# Patient Record
Sex: FEMALE | Race: WHITE | Employment: OTHER | URBAN - METROPOLITAN AREA
[De-identification: names, ages, dates, MRNs, and addresses within clinical notes are randomized per-mention and may not be internally consistent; named-entity substitution may affect disease eponyms.]

---

## 2018-11-01 PROBLEM — C50.111 MALIGNANT NEOPLASM OF CENTRAL PORTION OF RIGHT FEMALE BREAST (HCC): Status: ACTIVE | Noted: 2018-11-01

## 2018-11-05 DIAGNOSIS — C50.111 MALIGNANT NEOPLASM OF CENTRAL PORTION OF RIGHT BREAST IN FEMALE, ESTROGEN RECEPTOR NEGATIVE (HCC): ICD-10-CM

## 2018-11-05 DIAGNOSIS — Z17.1 MALIGNANT NEOPLASM OF CENTRAL PORTION OF RIGHT BREAST IN FEMALE, ESTROGEN RECEPTOR NEGATIVE (HCC): ICD-10-CM

## 2018-11-05 LAB
ALBUMIN SERPL-MCNC: 3.9 G/DL (ref 3.9–4.9)
ALP BLD-CCNC: 62 U/L (ref 40–130)
ALT SERPL-CCNC: 10 U/L (ref 0–33)
ANION GAP SERPL CALCULATED.3IONS-SCNC: 14 MEQ/L (ref 7–13)
AST SERPL-CCNC: 13 U/L (ref 0–35)
BILIRUB SERPL-MCNC: <0.2 MG/DL (ref 0–1.2)
BUN BLDV-MCNC: 14 MG/DL (ref 8–23)
CALCIUM SERPL-MCNC: 9.4 MG/DL (ref 8.6–10.2)
CHLORIDE BLD-SCNC: 104 MEQ/L (ref 98–107)
CO2: 23 MEQ/L (ref 22–29)
CREAT SERPL-MCNC: 1 MG/DL (ref 0.5–0.9)
GFR AFRICAN AMERICAN: >60
GFR NON-AFRICAN AMERICAN: 54.7
GLOBULIN: 3.1 G/DL (ref 2.3–3.5)
GLUCOSE BLD-MCNC: 157 MG/DL (ref 74–109)
POTASSIUM SERPL-SCNC: 4.1 MEQ/L (ref 3.5–5.1)
SODIUM BLD-SCNC: 141 MEQ/L (ref 132–144)
TOTAL PROTEIN: 7 G/DL (ref 6.4–8.1)

## 2018-12-12 DIAGNOSIS — Z17.1 MALIGNANT NEOPLASM OF CENTRAL PORTION OF RIGHT BREAST IN FEMALE, ESTROGEN RECEPTOR NEGATIVE (HCC): ICD-10-CM

## 2018-12-12 DIAGNOSIS — C50.111 MALIGNANT NEOPLASM OF CENTRAL PORTION OF RIGHT BREAST IN FEMALE, ESTROGEN RECEPTOR NEGATIVE (HCC): ICD-10-CM

## 2018-12-12 LAB
ALBUMIN SERPL-MCNC: 3.6 G/DL (ref 3.9–4.9)
ALP BLD-CCNC: 68 U/L (ref 40–130)
ALT SERPL-CCNC: 6 U/L (ref 0–33)
ANION GAP SERPL CALCULATED.3IONS-SCNC: 16 MEQ/L (ref 7–13)
AST SERPL-CCNC: 14 U/L (ref 0–35)
BILIRUB SERPL-MCNC: <0.2 MG/DL (ref 0–1.2)
BUN BLDV-MCNC: 9 MG/DL (ref 8–23)
CALCIUM SERPL-MCNC: 8.4 MG/DL (ref 8.6–10.2)
CHLORIDE BLD-SCNC: 107 MEQ/L (ref 98–107)
CO2: 23 MEQ/L (ref 22–29)
CREAT SERPL-MCNC: 1.18 MG/DL (ref 0.5–0.9)
GFR AFRICAN AMERICAN: 54.7
GFR NON-AFRICAN AMERICAN: 45.2
GLOBULIN: 2.9 G/DL (ref 2.3–3.5)
GLUCOSE BLD-MCNC: 134 MG/DL (ref 74–109)
POTASSIUM SERPL-SCNC: 3.5 MEQ/L (ref 3.5–5.1)
SODIUM BLD-SCNC: 146 MEQ/L (ref 132–144)
TOTAL PROTEIN: 6.5 G/DL (ref 6.4–8.1)

## 2018-12-19 DIAGNOSIS — D64.81 ANEMIA DUE TO ANTINEOPLASTIC CHEMOTHERAPY (CODE): ICD-10-CM

## 2018-12-19 DIAGNOSIS — C50.111 MALIGNANT NEOPLASM OF CENTRAL PORTION OF RIGHT BREAST IN FEMALE, ESTROGEN RECEPTOR NEGATIVE (HCC): ICD-10-CM

## 2018-12-19 DIAGNOSIS — Z17.1 MALIGNANT NEOPLASM OF CENTRAL PORTION OF RIGHT BREAST IN FEMALE, ESTROGEN RECEPTOR NEGATIVE (HCC): ICD-10-CM

## 2018-12-19 LAB
ALBUMIN SERPL-MCNC: 4 G/DL (ref 3.9–4.9)
ALP BLD-CCNC: 70 U/L (ref 40–130)
ALT SERPL-CCNC: 14 U/L (ref 0–33)
ANION GAP SERPL CALCULATED.3IONS-SCNC: 18 MEQ/L (ref 7–13)
AST SERPL-CCNC: 17 U/L (ref 0–35)
BILIRUB SERPL-MCNC: <0.2 MG/DL (ref 0–1.2)
BUN BLDV-MCNC: 15 MG/DL (ref 8–23)
CALCIUM SERPL-MCNC: 8.9 MG/DL (ref 8.6–10.2)
CHLORIDE BLD-SCNC: 104 MEQ/L (ref 98–107)
CO2: 21 MEQ/L (ref 22–29)
CREAT SERPL-MCNC: 1.13 MG/DL (ref 0.5–0.9)
GFR AFRICAN AMERICAN: 57.5
GFR NON-AFRICAN AMERICAN: 47.5
GLOBULIN: 3 G/DL (ref 2.3–3.5)
GLUCOSE BLD-MCNC: 137 MG/DL (ref 74–109)
POTASSIUM SERPL-SCNC: 4.2 MEQ/L (ref 3.5–5.1)
SODIUM BLD-SCNC: 143 MEQ/L (ref 132–144)
TOTAL PROTEIN: 7 G/DL (ref 6.4–8.1)

## 2018-12-26 DIAGNOSIS — D64.81 ANEMIA DUE TO ANTINEOPLASTIC CHEMOTHERAPY (CODE): ICD-10-CM

## 2018-12-26 DIAGNOSIS — Z17.1 MALIGNANT NEOPLASM OF CENTRAL PORTION OF RIGHT BREAST IN FEMALE, ESTROGEN RECEPTOR NEGATIVE (HCC): ICD-10-CM

## 2018-12-26 DIAGNOSIS — C50.111 MALIGNANT NEOPLASM OF CENTRAL PORTION OF RIGHT BREAST IN FEMALE, ESTROGEN RECEPTOR NEGATIVE (HCC): ICD-10-CM

## 2018-12-26 LAB
ANION GAP SERPL CALCULATED.3IONS-SCNC: 16 MEQ/L (ref 7–13)
BUN BLDV-MCNC: 19 MG/DL (ref 8–23)
CALCIUM SERPL-MCNC: 9.3 MG/DL (ref 8.6–10.2)
CHLORIDE BLD-SCNC: 104 MEQ/L (ref 98–107)
CO2: 21 MEQ/L (ref 22–29)
CREAT SERPL-MCNC: 1.11 MG/DL (ref 0.5–0.9)
GFR AFRICAN AMERICAN: 58.6
GFR NON-AFRICAN AMERICAN: 48.5
GLUCOSE BLD-MCNC: 111 MG/DL (ref 74–109)
POTASSIUM SERPL-SCNC: 5 MEQ/L (ref 3.5–5.1)
SODIUM BLD-SCNC: 141 MEQ/L (ref 132–144)

## 2019-01-10 PROBLEM — Z17.1 MALIGNANT NEOPLASM OF RIGHT BREAST IN FEMALE, ESTROGEN RECEPTOR NEGATIVE (HCC): Status: ACTIVE | Noted: 2018-11-01

## 2019-01-10 PROBLEM — C50.911 MALIGNANT NEOPLASM OF RIGHT BREAST IN FEMALE, ESTROGEN RECEPTOR NEGATIVE (HCC): Status: ACTIVE | Noted: 2018-11-01

## 2019-01-23 DIAGNOSIS — R19.7 DIARRHEA IN ADULT PATIENT: ICD-10-CM

## 2019-01-23 DIAGNOSIS — Z17.1 MALIGNANT NEOPLASM OF CENTRAL PORTION OF RIGHT BREAST IN FEMALE, ESTROGEN RECEPTOR NEGATIVE (HCC): ICD-10-CM

## 2019-01-23 DIAGNOSIS — D64.81 ANEMIA DUE TO ANTINEOPLASTIC CHEMOTHERAPY (CODE): ICD-10-CM

## 2019-01-23 DIAGNOSIS — C50.111 MALIGNANT NEOPLASM OF CENTRAL PORTION OF RIGHT BREAST IN FEMALE, ESTROGEN RECEPTOR NEGATIVE (HCC): ICD-10-CM

## 2019-01-23 LAB
ALBUMIN SERPL-MCNC: 4 G/DL (ref 3.9–4.9)
ALP BLD-CCNC: 69 U/L (ref 40–130)
ALT SERPL-CCNC: 10 U/L (ref 0–33)
ANION GAP SERPL CALCULATED.3IONS-SCNC: 10 MEQ/L (ref 7–13)
AST SERPL-CCNC: 15 U/L (ref 0–35)
BILIRUB SERPL-MCNC: <0.2 MG/DL (ref 0–1.2)
BUN BLDV-MCNC: 20 MG/DL (ref 8–23)
CALCIUM SERPL-MCNC: 9.4 MG/DL (ref 8.6–10.2)
CHLORIDE BLD-SCNC: 102 MEQ/L (ref 98–107)
CO2: 24 MEQ/L (ref 22–29)
CREAT SERPL-MCNC: 1.15 MG/DL (ref 0.5–0.9)
GFR AFRICAN AMERICAN: 56.3
GFR NON-AFRICAN AMERICAN: 46.5
GLOBULIN: 2.9 G/DL (ref 2.3–3.5)
GLUCOSE BLD-MCNC: 169 MG/DL (ref 74–109)
POTASSIUM SERPL-SCNC: 4.8 MEQ/L (ref 3.5–5.1)
SODIUM BLD-SCNC: 136 MEQ/L (ref 132–144)
TOTAL PROTEIN: 6.9 G/DL (ref 6.4–8.1)

## 2019-02-04 ENCOUNTER — HOSPITAL ENCOUNTER (OUTPATIENT)
Dept: RADIATION ONCOLOGY | Age: 69
Discharge: HOME | End: 2019-02-04
Payer: MEDICARE

## 2019-02-04 VITALS
SYSTOLIC BLOOD PRESSURE: 197 MMHG | DIASTOLIC BLOOD PRESSURE: 86 MMHG | WEIGHT: 131.4 LBS | HEIGHT: 62 IN | TEMPERATURE: 97 F | BODY MASS INDEX: 24.18 KG/M2 | OXYGEN SATURATION: 97 % | HEART RATE: 65 BPM | RESPIRATION RATE: 16 BRPM

## 2019-02-04 DIAGNOSIS — J44.9 CHRONIC OBSTRUCTIVE PULMONARY DISEASE, UNSPECIFIED COPD TYPE (HCC): ICD-10-CM

## 2019-02-04 DIAGNOSIS — C50.111 MALIGNANT NEOPLASM OF CENTRAL PORTION OF RIGHT BREAST IN FEMALE, ESTROGEN RECEPTOR NEGATIVE (HCC): ICD-10-CM

## 2019-02-04 DIAGNOSIS — Z17.1 MALIGNANT NEOPLASM OF CENTRAL PORTION OF RIGHT BREAST IN FEMALE, ESTROGEN RECEPTOR NEGATIVE (HCC): ICD-10-CM

## 2019-02-04 DIAGNOSIS — E11.8 TYPE 2 DIABETES MELLITUS WITH COMPLICATION, UNSPECIFIED WHETHER LONG TERM INSULIN USE: ICD-10-CM

## 2019-02-04 DIAGNOSIS — I10 ESSENTIAL HYPERTENSION: ICD-10-CM

## 2019-02-04 PROCEDURE — 99214 OFFICE O/P EST MOD 30 MIN: CPT | Performed by: RADIOLOGY

## 2019-02-06 ENCOUNTER — OFFICE VISIT (OUTPATIENT)
Dept: SURGERY | Age: 69
End: 2019-02-06
Payer: MEDICARE

## 2019-02-06 VITALS
HEIGHT: 62 IN | WEIGHT: 131 LBS | BODY MASS INDEX: 24.11 KG/M2 | HEART RATE: 75 BPM | DIASTOLIC BLOOD PRESSURE: 72 MMHG | OXYGEN SATURATION: 98 % | RESPIRATION RATE: 20 BRPM | SYSTOLIC BLOOD PRESSURE: 168 MMHG | TEMPERATURE: 96.4 F

## 2019-02-06 DIAGNOSIS — C50.411 CARCINOMA OF UPPER-OUTER QUADRANT OF RIGHT BREAST IN FEMALE, ESTROGEN RECEPTOR NEGATIVE (HCC): Primary | ICD-10-CM

## 2019-02-06 DIAGNOSIS — C50.911 HER2-POSITIVE CARCINOMA OF RIGHT BREAST (HCC): ICD-10-CM

## 2019-02-06 DIAGNOSIS — Z17.1 CARCINOMA OF UPPER-OUTER QUADRANT OF RIGHT BREAST IN FEMALE, ESTROGEN RECEPTOR NEGATIVE (HCC): Primary | ICD-10-CM

## 2019-02-06 PROCEDURE — 99205 OFFICE O/P NEW HI 60 MIN: CPT | Performed by: SURGERY

## 2019-02-07 ENCOUNTER — TELEPHONE (OUTPATIENT)
Dept: SURGERY | Age: 69
End: 2019-02-07

## 2019-02-07 ASSESSMENT — ENCOUNTER SYMPTOMS
SORE THROAT: 0
COUGH: 0
NAUSEA: 1
ABDOMINAL PAIN: 0
DIARRHEA: 1
VOMITING: 1
CHEST TIGHTNESS: 0
SHORTNESS OF BREATH: 0
COLOR CHANGE: 0

## 2019-02-20 PROBLEM — D64.81 ANEMIA DUE TO ANTINEOPLASTIC CHEMOTHERAPY (CODE): Status: ACTIVE | Noted: 2019-02-20

## 2019-03-07 ENCOUNTER — HOSPITAL ENCOUNTER (OUTPATIENT)
Dept: RADIATION ONCOLOGY | Age: 69
Discharge: HOME | End: 2019-03-07
Payer: MEDICARE

## 2019-03-07 VITALS
BODY MASS INDEX: 24.22 KG/M2 | HEART RATE: 78 BPM | DIASTOLIC BLOOD PRESSURE: 73 MMHG | WEIGHT: 132.4 LBS | RESPIRATION RATE: 16 BRPM | SYSTOLIC BLOOD PRESSURE: 171 MMHG | TEMPERATURE: 97.8 F

## 2019-03-07 PROCEDURE — 77290 THER RAD SIMULAJ FIELD CPLX: CPT | Performed by: RADIOLOGY

## 2019-03-07 PROCEDURE — 99214 OFFICE O/P EST MOD 30 MIN: CPT | Performed by: RADIOLOGY

## 2019-03-07 PROCEDURE — 77334 RADIATION TREATMENT AID(S): CPT | Performed by: RADIOLOGY

## 2019-03-13 PROCEDURE — 77300 RADIATION THERAPY DOSE PLAN: CPT | Performed by: RADIOLOGY

## 2019-03-13 PROCEDURE — 77295 3-D RADIOTHERAPY PLAN: CPT | Performed by: RADIOLOGY

## 2019-03-13 PROCEDURE — 77334 RADIATION TREATMENT AID(S): CPT | Performed by: RADIOLOGY

## 2019-03-21 PROCEDURE — 77280 THER RAD SIMULAJ FIELD SMPL: CPT | Performed by: RADIOLOGY

## 2019-03-25 PROCEDURE — 77412 RADIATION TX DELIVERY LVL 3: CPT | Performed by: RADIOLOGY

## 2019-03-26 PROCEDURE — 77412 RADIATION TX DELIVERY LVL 3: CPT | Performed by: RADIOLOGY

## 2019-03-27 DIAGNOSIS — C50.111 MALIGNANT NEOPLASM OF CENTRAL PORTION OF RIGHT BREAST IN FEMALE, ESTROGEN RECEPTOR NEGATIVE (HCC): ICD-10-CM

## 2019-03-27 DIAGNOSIS — C50.111 MALIGNANT NEOPLASM OF CENTRAL PORTION OF RIGHT FEMALE BREAST, UNSPECIFIED ESTROGEN RECEPTOR STATUS (HCC): ICD-10-CM

## 2019-03-27 DIAGNOSIS — D64.81 ANEMIA DUE TO ANTINEOPLASTIC CHEMOTHERAPY (CODE): ICD-10-CM

## 2019-03-27 DIAGNOSIS — Z17.1 MALIGNANT NEOPLASM OF CENTRAL PORTION OF RIGHT BREAST IN FEMALE, ESTROGEN RECEPTOR NEGATIVE (HCC): ICD-10-CM

## 2019-03-27 LAB
ALBUMIN SERPL-MCNC: 3.8 G/DL (ref 3.5–4.6)
ALP BLD-CCNC: 68 U/L (ref 40–130)
ALT SERPL-CCNC: 8 U/L (ref 0–33)
ANION GAP SERPL CALCULATED.3IONS-SCNC: 10 MEQ/L (ref 9–15)
AST SERPL-CCNC: 20 U/L (ref 0–35)
BILIRUB SERPL-MCNC: <0.2 MG/DL (ref 0.2–0.7)
BUN BLDV-MCNC: 17 MG/DL (ref 8–23)
CALCIUM SERPL-MCNC: 9.4 MG/DL (ref 8.5–9.9)
CHLORIDE BLD-SCNC: 105 MEQ/L (ref 95–107)
CO2: 24 MEQ/L (ref 20–31)
CREAT SERPL-MCNC: 1.07 MG/DL (ref 0.5–0.9)
GFR AFRICAN AMERICAN: >60
GFR NON-AFRICAN AMERICAN: 50.5
GLOBULIN: 3.2 G/DL (ref 2.3–3.5)
GLUCOSE BLD-MCNC: 135 MG/DL (ref 70–99)
POTASSIUM SERPL-SCNC: 5 MEQ/L (ref 3.4–4.9)
SODIUM BLD-SCNC: 139 MEQ/L (ref 135–144)
TOTAL PROTEIN: 7 G/DL (ref 6.3–8)

## 2019-03-27 PROCEDURE — 77412 RADIATION TX DELIVERY LVL 3: CPT | Performed by: RADIOLOGY

## 2019-03-27 PROCEDURE — 99212 OFFICE O/P EST SF 10 MIN: CPT | Performed by: RADIOLOGY

## 2019-03-28 PROCEDURE — 77412 RADIATION TX DELIVERY LVL 3: CPT | Performed by: RADIOLOGY

## 2019-03-29 PROCEDURE — 77336 RADIATION PHYSICS CONSULT: CPT | Performed by: RADIOLOGY

## 2019-03-29 PROCEDURE — 77412 RADIATION TX DELIVERY LVL 3: CPT | Performed by: RADIOLOGY

## 2019-03-29 PROCEDURE — 77417 THER RADIOLOGY PORT IMAGE(S): CPT | Performed by: RADIOLOGY

## 2019-04-01 ENCOUNTER — HOSPITAL ENCOUNTER (OUTPATIENT)
Dept: RADIATION ONCOLOGY | Age: 69
Discharge: HOME | End: 2019-04-01
Payer: MEDICARE

## 2019-04-01 PROCEDURE — 77412 RADIATION TX DELIVERY LVL 3: CPT | Performed by: RADIOLOGY

## 2019-04-02 PROCEDURE — 77412 RADIATION TX DELIVERY LVL 3: CPT | Performed by: RADIOLOGY

## 2019-04-02 PROCEDURE — 99211 OFF/OP EST MAY X REQ PHY/QHP: CPT | Performed by: RADIOLOGY

## 2019-04-03 PROCEDURE — 77412 RADIATION TX DELIVERY LVL 3: CPT | Performed by: RADIOLOGY

## 2019-04-04 PROCEDURE — 77412 RADIATION TX DELIVERY LVL 3: CPT | Performed by: RADIOLOGY

## 2019-04-05 PROCEDURE — 77412 RADIATION TX DELIVERY LVL 3: CPT | Performed by: RADIOLOGY

## 2019-04-05 PROCEDURE — 77417 THER RADIOLOGY PORT IMAGE(S): CPT | Performed by: RADIOLOGY

## 2019-04-05 PROCEDURE — 77336 RADIATION PHYSICS CONSULT: CPT | Performed by: RADIOLOGY

## 2019-04-08 PROCEDURE — 77412 RADIATION TX DELIVERY LVL 3: CPT | Performed by: RADIOLOGY

## 2019-04-09 PROCEDURE — 99212 OFFICE O/P EST SF 10 MIN: CPT | Performed by: RADIOLOGY

## 2019-04-09 PROCEDURE — 77412 RADIATION TX DELIVERY LVL 3: CPT | Performed by: RADIOLOGY

## 2019-04-10 PROCEDURE — 77412 RADIATION TX DELIVERY LVL 3: CPT | Performed by: RADIOLOGY

## 2019-04-11 PROCEDURE — 77412 RADIATION TX DELIVERY LVL 3: CPT | Performed by: RADIOLOGY

## 2019-04-12 PROCEDURE — 77412 RADIATION TX DELIVERY LVL 3: CPT | Performed by: RADIOLOGY

## 2019-04-12 PROCEDURE — 77336 RADIATION PHYSICS CONSULT: CPT | Performed by: RADIOLOGY

## 2019-04-12 PROCEDURE — 77417 THER RADIOLOGY PORT IMAGE(S): CPT | Performed by: RADIOLOGY

## 2019-04-15 PROCEDURE — 77412 RADIATION TX DELIVERY LVL 3: CPT | Performed by: RADIOLOGY

## 2019-04-16 PROCEDURE — 99211 OFF/OP EST MAY X REQ PHY/QHP: CPT | Performed by: RADIOLOGY

## 2019-04-16 PROCEDURE — 77412 RADIATION TX DELIVERY LVL 3: CPT | Performed by: RADIOLOGY

## 2019-04-17 DIAGNOSIS — C50.111 MALIGNANT NEOPLASM OF CENTRAL PORTION OF RIGHT FEMALE BREAST, UNSPECIFIED ESTROGEN RECEPTOR STATUS (HCC): ICD-10-CM

## 2019-04-17 DIAGNOSIS — D64.81 ANEMIA DUE TO ANTINEOPLASTIC CHEMOTHERAPY (CODE): ICD-10-CM

## 2019-04-17 LAB
ALBUMIN SERPL-MCNC: 3.9 G/DL (ref 3.5–4.6)
ALP BLD-CCNC: 65 U/L (ref 40–130)
ALT SERPL-CCNC: 6 U/L (ref 0–33)
ANION GAP SERPL CALCULATED.3IONS-SCNC: 14 MEQ/L (ref 9–15)
AST SERPL-CCNC: 14 U/L (ref 0–35)
BILIRUB SERPL-MCNC: <0.2 MG/DL (ref 0.2–0.7)
BUN BLDV-MCNC: 18 MG/DL (ref 8–23)
CALCIUM SERPL-MCNC: 9.5 MG/DL (ref 8.5–9.9)
CHLORIDE BLD-SCNC: 104 MEQ/L (ref 95–107)
CO2: 23 MEQ/L (ref 20–31)
CREAT SERPL-MCNC: 1.33 MG/DL (ref 0.5–0.9)
GFR AFRICAN AMERICAN: 47.6
GFR NON-AFRICAN AMERICAN: 39.3
GLOBULIN: 3 G/DL (ref 2.3–3.5)
GLUCOSE BLD-MCNC: 106 MG/DL (ref 70–99)
POTASSIUM SERPL-SCNC: 4.8 MEQ/L (ref 3.4–4.9)
SODIUM BLD-SCNC: 141 MEQ/L (ref 135–144)
TOTAL PROTEIN: 6.9 G/DL (ref 6.3–8)

## 2019-04-17 PROCEDURE — 77412 RADIATION TX DELIVERY LVL 3: CPT | Performed by: RADIOLOGY

## 2019-04-18 PROCEDURE — 77412 RADIATION TX DELIVERY LVL 3: CPT | Performed by: RADIOLOGY

## 2019-04-19 PROCEDURE — 77412 RADIATION TX DELIVERY LVL 3: CPT | Performed by: RADIOLOGY

## 2019-04-19 PROCEDURE — 77336 RADIATION PHYSICS CONSULT: CPT | Performed by: RADIOLOGY

## 2019-04-19 PROCEDURE — 77417 THER RADIOLOGY PORT IMAGE(S): CPT | Performed by: RADIOLOGY

## 2019-04-22 PROCEDURE — 77412 RADIATION TX DELIVERY LVL 3: CPT | Performed by: RADIOLOGY

## 2019-04-23 VITALS
OXYGEN SATURATION: 98 % | RESPIRATION RATE: 16 BRPM | HEART RATE: 62 BPM | SYSTOLIC BLOOD PRESSURE: 141 MMHG | TEMPERATURE: 97.8 F | BODY MASS INDEX: 24.03 KG/M2 | DIASTOLIC BLOOD PRESSURE: 61 MMHG | WEIGHT: 131.4 LBS

## 2019-04-23 PROCEDURE — 99213 OFFICE O/P EST LOW 20 MIN: CPT | Performed by: RADIOLOGY

## 2019-04-23 PROCEDURE — 77412 RADIATION TX DELIVERY LVL 3: CPT | Performed by: RADIOLOGY

## 2019-04-23 RX ORDER — LOPERAMIDE HYDROCHLORIDE 2 MG/1
2 CAPSULE ORAL 4 TIMES DAILY PRN
COMMUNITY

## 2019-04-23 NOTE — ONCOLOGY
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 Poorly tolerated neoadjuvant chemotherapy, on Herceptin. [id number]. Treatment Dates:  3/25-4/26/19    Site: Dose: Total # fractions: Dose per fraction: Energy: Technique   R SCF and nodes 45 Gy 25 1.8 Gy 18 MV photons Irish RPO   R breast 50 Gy 25 2 Gy 10 MV photons Opposed tangents     Concurrent therapy:  q 3 wk Herceptin perjeta    Treatment course:  Ms. Wali Bhakta tolerated radiation treatment well with minimal skin toxicity. Patient was instructed to return to Medical oncology for continued Herceptin perjeta and I will see her back in 3 months.  Mammography at [facility] scheduled by Dr Shon Villarreal, who she sees in June or July

## 2019-04-24 PROCEDURE — 77412 RADIATION TX DELIVERY LVL 3: CPT | Performed by: RADIOLOGY

## 2019-04-25 PROCEDURE — 77412 RADIATION TX DELIVERY LVL 3: CPT | Performed by: RADIOLOGY

## 2019-04-26 PROCEDURE — 77412 RADIATION TX DELIVERY LVL 3: CPT | Performed by: RADIOLOGY

## 2019-04-26 PROCEDURE — 77336 RADIATION PHYSICS CONSULT: CPT | Performed by: RADIOLOGY

## 2019-08-02 ENCOUNTER — HOSPITAL ENCOUNTER (OUTPATIENT)
Dept: RADIATION ONCOLOGY | Age: 69
Discharge: HOME | End: 2019-08-02
Payer: MEDICARE

## 2019-08-02 VITALS
TEMPERATURE: 96.3 F | OXYGEN SATURATION: 100 % | DIASTOLIC BLOOD PRESSURE: 66 MMHG | BODY MASS INDEX: 24.6 KG/M2 | HEART RATE: 67 BPM | RESPIRATION RATE: 16 BRPM | WEIGHT: 134.5 LBS | SYSTOLIC BLOOD PRESSURE: 152 MMHG

## 2019-08-02 PROCEDURE — 99213 OFFICE O/P EST LOW 20 MIN: CPT | Performed by: RADIOLOGY

## 2021-09-07 ENCOUNTER — APPOINTMENT (OUTPATIENT)
Dept: GENERAL RADIOLOGY | Age: 71
End: 2021-09-07
Payer: MEDICARE

## 2021-09-07 ENCOUNTER — HOSPITAL ENCOUNTER (EMERGENCY)
Age: 71
Discharge: HOME | End: 2021-09-07
Payer: MEDICARE

## 2021-09-07 VITALS
DIASTOLIC BLOOD PRESSURE: 53 MMHG | BODY MASS INDEX: 27.23 KG/M2 | RESPIRATION RATE: 16 BRPM | HEART RATE: 60 BPM | SYSTOLIC BLOOD PRESSURE: 116 MMHG | OXYGEN SATURATION: 100 % | TEMPERATURE: 97.7 F | HEIGHT: 62 IN | WEIGHT: 148 LBS

## 2021-09-07 DIAGNOSIS — J40 BRONCHITIS: Primary | ICD-10-CM

## 2021-09-07 LAB
ALBUMIN SERPL-MCNC: 3.5 G/DL (ref 3.5–4.6)
ALP BLD-CCNC: 86 U/L (ref 40–130)
ALT SERPL-CCNC: 51 U/L (ref 0–33)
ANION GAP SERPL CALCULATED.3IONS-SCNC: 16 MEQ/L (ref 9–15)
AST SERPL-CCNC: 43 U/L (ref 0–35)
BACTERIA: NEGATIVE /HPF
BASOPHILS ABSOLUTE: 0.1 K/UL (ref 0–0.2)
BASOPHILS RELATIVE PERCENT: 0.4 %
BILIRUB SERPL-MCNC: <0.2 MG/DL (ref 0.2–0.7)
BILIRUBIN URINE: NEGATIVE
BLOOD, URINE: NEGATIVE
BUN BLDV-MCNC: 29 MG/DL (ref 8–23)
CALCIUM SERPL-MCNC: 9 MG/DL (ref 8.5–9.9)
CHLORIDE BLD-SCNC: 105 MEQ/L (ref 95–107)
CLARITY: CLEAR
CO2: 18 MEQ/L (ref 20–31)
COLOR: YELLOW
CREAT SERPL-MCNC: 1.45 MG/DL (ref 0.5–0.9)
EOSINOPHILS ABSOLUTE: 0 K/UL (ref 0–0.7)
EOSINOPHILS RELATIVE PERCENT: 0 %
EPITHELIAL CELLS, UA: NORMAL /HPF (ref 0–5)
GFR AFRICAN AMERICAN: 42.8
GFR NON-AFRICAN AMERICAN: 35.3
GLOBULIN: 3.5 G/DL (ref 2.3–3.5)
GLUCOSE BLD-MCNC: 190 MG/DL (ref 70–99)
GLUCOSE URINE: NEGATIVE MG/DL
HCT VFR BLD CALC: 34.6 % (ref 37–47)
HEMOGLOBIN: 11.5 G/DL (ref 12–16)
HYALINE CASTS: NORMAL /HPF (ref 0–5)
KETONES, URINE: NEGATIVE MG/DL
LACTIC ACID: 2.5 MMOL/L (ref 0.5–2.2)
LEUKOCYTE ESTERASE, URINE: NEGATIVE
LYMPHOCYTES ABSOLUTE: 1.9 K/UL (ref 1–4.8)
LYMPHOCYTES RELATIVE PERCENT: 14.1 %
MCH RBC QN AUTO: 31 PG (ref 27–31.3)
MCHC RBC AUTO-ENTMCNC: 33.4 % (ref 33–37)
MCV RBC AUTO: 93 FL (ref 82–100)
MONOCYTES ABSOLUTE: 0.7 K/UL (ref 0.2–0.8)
MONOCYTES RELATIVE PERCENT: 4.9 %
NEUTROPHILS ABSOLUTE: 11 K/UL (ref 1.4–6.5)
NEUTROPHILS RELATIVE PERCENT: 80.6 %
NITRITE, URINE: NEGATIVE
PDW BLD-RTO: 14 % (ref 11.5–14.5)
PH UA: 5 (ref 5–9)
PLATELET # BLD: 256 K/UL (ref 130–400)
POTASSIUM SERPL-SCNC: 4.9 MEQ/L (ref 3.4–4.9)
PROCALCITONIN: 0.1 NG/ML (ref 0–0.15)
PROTEIN UA: 30 MG/DL
RBC # BLD: 3.72 M/UL (ref 4.2–5.4)
RBC UA: NORMAL /HPF (ref 0–5)
SARS-COV-2, NAAT: NOT DETECTED
SODIUM BLD-SCNC: 139 MEQ/L (ref 135–144)
SPECIFIC GRAVITY UA: 1.03 (ref 1–1.03)
TOTAL PROTEIN: 7 G/DL (ref 6.3–8)
URINE REFLEX TO CULTURE: ABNORMAL
UROBILINOGEN, URINE: 0.2 E.U./DL
WBC # BLD: 13.7 K/UL (ref 4.8–10.8)
WBC UA: NORMAL /HPF (ref 0–5)

## 2021-09-07 PROCEDURE — 71045 X-RAY EXAM CHEST 1 VIEW: CPT

## 2021-09-07 PROCEDURE — 87635 SARS-COV-2 COVID-19 AMP PRB: CPT

## 2021-09-07 PROCEDURE — 81001 URINALYSIS AUTO W/SCOPE: CPT

## 2021-09-07 PROCEDURE — 36415 COLL VENOUS BLD VENIPUNCTURE: CPT

## 2021-09-07 PROCEDURE — 85025 COMPLETE CBC W/AUTO DIFF WBC: CPT

## 2021-09-07 PROCEDURE — 99284 EMERGENCY DEPT VISIT MOD MDM: CPT

## 2021-09-07 PROCEDURE — 84145 PROCALCITONIN (PCT): CPT

## 2021-09-07 PROCEDURE — 6360000002 HC RX W HCPCS: Performed by: NURSE PRACTITIONER

## 2021-09-07 PROCEDURE — 2580000003 HC RX 258: Performed by: NURSE PRACTITIONER

## 2021-09-07 PROCEDURE — 96365 THER/PROPH/DIAG IV INF INIT: CPT

## 2021-09-07 PROCEDURE — 80053 COMPREHEN METABOLIC PANEL: CPT

## 2021-09-07 PROCEDURE — 87040 BLOOD CULTURE FOR BACTERIA: CPT

## 2021-09-07 PROCEDURE — 83605 ASSAY OF LACTIC ACID: CPT

## 2021-09-07 PROCEDURE — 96367 TX/PROPH/DG ADDL SEQ IV INF: CPT

## 2021-09-07 RX ORDER — CEFUROXIME AXETIL 500 MG/1
500 TABLET ORAL 2 TIMES DAILY
Qty: 14 TABLET | Refills: 0 | Status: SHIPPED | OUTPATIENT
Start: 2021-09-07 | End: 2021-09-07 | Stop reason: SDUPTHER

## 2021-09-07 RX ORDER — DOXYCYCLINE 100 MG/1
100 TABLET ORAL 2 TIMES DAILY
Qty: 14 TABLET | Refills: 0 | Status: SHIPPED | OUTPATIENT
Start: 2021-09-07 | End: 2021-09-14

## 2021-09-07 RX ORDER — CEFUROXIME AXETIL 500 MG/1
500 TABLET ORAL 2 TIMES DAILY
Qty: 14 TABLET | Refills: 0 | Status: SHIPPED | OUTPATIENT
Start: 2021-09-07 | End: 2021-09-14

## 2021-09-07 RX ORDER — DOXYCYCLINE 100 MG/1
100 TABLET ORAL 2 TIMES DAILY
Qty: 14 TABLET | Refills: 0 | Status: SHIPPED | OUTPATIENT
Start: 2021-09-07 | End: 2021-09-07 | Stop reason: SDUPTHER

## 2021-09-07 RX ADMIN — AZITHROMYCIN MONOHYDRATE 500 MG: 500 INJECTION, POWDER, LYOPHILIZED, FOR SOLUTION INTRAVENOUS at 12:15

## 2021-09-07 RX ADMIN — CEFTRIAXONE SODIUM 1000 MG: 1 INJECTION, POWDER, FOR SOLUTION INTRAMUSCULAR; INTRAVENOUS at 11:35

## 2021-09-07 ASSESSMENT — ENCOUNTER SYMPTOMS
DIARRHEA: 0
VOICE CHANGE: 0
VOMITING: 0
SORE THROAT: 0
COUGH: 1
ABDOMINAL PAIN: 0
NAUSEA: 0
BACK PAIN: 0
SHORTNESS OF BREATH: 0

## 2021-09-07 NOTE — ED NOTES
Pt states she has not taken any of her medications for today.      Alexsandra Ely, RN  09/07/21 [id number]

## 2021-09-07 NOTE — ED PROVIDER NOTES
[address] [facility] ED  eMERGENCY dEPARTMENT eNCOUnter      Pt Name: Katherleen Skiff  MRN: [id number]  [place] 1950  Date of evaluation: 9/7/2021  Provider: ANDRES Collado CNP      HISTORY OF PRESENT ILLNESS    Katherleen Skiff is a 66 y.o. female who presents to the Emergency Department with cough x 1 week. Patient states she was at [facility] yesterday but left d/t being in the room with COVID positive patients. She comes to the ER today because she is still not feeling well. She does have a history of emphysema and is a smoker. She denies fever, SOB, leg swelling, chest pain. Eating and drinking fair. REVIEW OF SYSTEMS       Review of Systems   Constitutional: Positive for fatigue. Negative for activity change, appetite change and fever. HENT: Negative for congestion, sneezing, sore throat and voice change. Respiratory: Positive for cough. Negative for shortness of breath. Cardiovascular: Negative for chest pain. Gastrointestinal: Negative for abdominal pain, diarrhea, nausea and vomiting. Genitourinary: Negative for dysuria. Musculoskeletal: Negative for arthralgias, back pain and neck pain. Skin: Negative for rash. Neurological: Negative for dizziness, light-headedness and headaches. All other systems reviewed and are negative. PAST MEDICAL HISTORY     Past Medical History:   Diagnosis Date    Breast cancer ([facility] [address]     breast    COPD (chronic obstructive pulmonary disease) ([facility] [address]     emphysema    Diabetes mellitus ([facility] [address]     Hypertension     Type 2 diabetes mellitus without complication ([facility] [address]          SURGICAL HISTORY       Past Surgical History:   Procedure Laterality Date    APPENDECTOMY      BREAST SURGERY Left     UPPER GASTROINTESTINAL ENDOSCOPY  10/09/2018    DR. FORD         CURRENT MEDICATIONS       Previous Medications    ASPIRIN 81 MG TABLET    Take 81 mg by mouth daily    CHOLECALCIFEROL (VITAMIN D3) 5000 UNITS CAPS    Take 1 capsule by mouth daily DIPHENOXYLATE-ATROPINE (LOMOTIL) 2.5-0.025 MG PER TABLET    Take 1 tablet by mouth 4 times daily as needed for Diarrhea. INFLUENZA VAC A&B SURF ANT ADJ 0.5 ML AUGUSTO    Inject 0.5 mLs into the muscle once    LIDOCAINE-PRILOCAINE (EMLA) 2.5-2.5 % CREAM    Apply 1 drop topically daily    LOPERAMIDE (IMODIUM) 2 MG CAPSULE    Take 2 mg by mouth 4 times daily as needed for Diarrhea    METFORMIN (GLUCOPHAGE) 500 MG TABLET    Take 1 tablet by mouth daily    METOPROLOL SUCCINATE (TOPROL XL) 50 MG EXTENDED RELEASE TABLET    Take 50 mg by mouth daily    PANTOPRAZOLE (PROTONIX) 40 MG TABLET    Take 1 tablet by mouth daily    PROCHLORPERAZINE (COMPAZINE) 10 MG TABLET    Take 1 tablet by mouth every 6 hours as needed (for nausea or vomiting)    VITAMIN B-12 (CYANOCOBALAMIN) 100 MCG TABLET    Take 50 mcg by mouth daily       ALLERGIES     Lipitor [atorvastatin] and Zofran [ondansetron]    FAMILY HISTORY       Family History   Problem Relation Age of Onset    No Known Problems Mother     Cancer Father     No Known Problems Sister     No Known Problems Brother     No Known Problems Maternal Aunt     No Known Problems Maternal Uncle     No Known Problems Paternal Aunt     No Known Problems Paternal Uncle     No Known Problems Maternal Grandmother     No Known Problems Maternal Grandfather     No Known Problems Paternal Grandmother     No Known Problems Paternal Grandfather     No Known Problems Other           SOCIAL HISTORY       Social History     Socioeconomic History    Marital status:       Spouse name: None    Number of children: None    Years of education: None    Highest education level: None   Occupational History    Occupation: retired   Tobacco Use    Smoking status: Heavy Tobacco Smoker     Packs/day: 1.00     Years: 40.00     Pack years: 40.00    Smokeless tobacco: Never Used    Tobacco comment: trying to slow down smoking, pt encourage to quit smoking entirely   Substance and Sexual Activity  Alcohol use: No    Drug use: No    Sexual activity: Not Currently     Partners: Female   Other Topics Concern    None   Social History Narrative    None     Social Determinants of Health     Financial Resource Strain:     Difficulty of Paying Living Expenses:    Food Insecurity:     Worried About Running Out of Food in the Last Year:     Ran Out of Food in the Last Year:    Transportation Needs:     Lack of Transportation (Medical):  Lack of Transportation (Non-Medical):    Physical Activity:     Days of Exercise per Week:     Minutes of Exercise per Session:    Stress:     Feeling of Stress :    Social Connections:     Frequency of Communication with Friends and Family:     Frequency of Social Gatherings with Friends and Family:     Attends Caodaism Services:     Active Member of Clubs or Organizations:     Attends Club or Organization Meetings:     Marital Status:    Intimate Partner Violence:     Fear of Current or Ex-Partner:     Emotionally Abused:     Physically Abused:     Sexually Abused:        SCREENINGS      @FLOW([id number])@      PHYSICAL EXAM    (up to 7 for level 4, 8 or more for level 5)     ED Triage Vitals [09/07/21 0900]   BP Temp Temp Source Pulse Resp SpO2 Height Weight   129/73 97.7 °F (36.5 °C) Oral 75 18 97 % 5' 2\" (1.575 m) 148 lb (67.1 kg)       Physical Exam  Vitals and nursing note reviewed. Constitutional:       Appearance: She is well-developed. HENT:      Head: Normocephalic and atraumatic. Right Ear: Hearing, tympanic membrane, ear canal and external ear normal.      Left Ear: Hearing, tympanic membrane, ear canal and external ear normal.      Nose: Nose normal.      Mouth/Throat:      Lips: Pink. Mouth: Mucous membranes are moist.      Pharynx: Oropharynx is clear. Uvula midline. Eyes:      Conjunctiva/sclera: Conjunctivae normal.      Pupils: Pupils are equal, round, and reactive to light.    Cardiovascular:      Rate and Rhythm: Normal rate and regular rhythm. Heart sounds: Normal heart sounds. Pulmonary:      Effort: Pulmonary effort is normal. No accessory muscle usage, respiratory distress or retractions. Breath sounds: Normal breath sounds. No decreased air movement. No decreased breath sounds, wheezing or rhonchi. Abdominal:      General: Bowel sounds are normal. There is no distension. Palpations: Abdomen is soft. Tenderness: There is no abdominal tenderness. Musculoskeletal:         General: Normal range of motion. Cervical back: Normal range of motion and neck supple. Skin:     General: Skin is warm and dry. Neurological:      General: No focal deficit present. Mental Status: She is alert and oriented to person, place, and time. GCS: GCS eye subscore is 4. GCS verbal subscore is 5. GCS motor subscore is 6. Deep Tendon Reflexes: Reflexes are normal and symmetric. Psychiatric:         Judgment: Judgment normal.           All other labs were within normal range or not returned as of this dictation. EMERGENCY DEPARTMENT COURSE and DIFFERENTIALDIAGNOSIS/MDM:   Vitals:    Vitals:    09/07/21 0900 09/07/21 1115 09/07/21 1230 09/07/21 1300   BP: 129/73 (!) 114/51 (!) 128/56 (!) 116/53   Pulse: 75 60     Resp: 18 16     Temp: 97.7 °F (36.5 °C)      TempSrc: Oral      SpO2: 97% 98% 98% 100%   Weight: 148 lb (67.1 kg)      Height: 5' 2\" (1.575 m)               66 yr old female with acute bronchitis. Prescription for Ceftin and Doxy was sent to the pharmacy. Patient is agreeable to try outpatient therapy, return to ER if symptoms worsen. Patient is stable at discharge and verbalizes understanding. PROCEDURES:  Unless otherwise noted below, none     Procedures      FINAL IMPRESSION      1.  Bronchitis          DISPOSITION/PLAN   DISPOSITION Decision To Discharge 09/07/2021 01:16:27 PM          ANDRES Marquez CNP (electronically signed)  Attending Emergency Physician     Luz Elena Shah Giorgi Nunez, APRN - CNP  09/07/21 [id number]

## 2021-09-07 NOTE — ED TRIAGE NOTES
Pt to ed from home via triage with c/o \"new pneumonia\".    Pt states that she was test twice last week for feeling \"sick\" with a cough and shortness of breath, both covid negative  Pt states that he was admitted to [facility] at 12 yesterday and left ama because \"they were not doing anything for me\"  On arrival, pt respirations even and with some dyspnea on exertion noted  Pt denies home 02 or neb use  Skin pale, warm dry and intact  Pt ambulatory with steady even gait

## 2021-09-12 LAB
BLOOD CULTURE, ROUTINE: NORMAL
CULTURE, BLOOD 2: NORMAL

## 2023-03-24 DIAGNOSIS — I10 HYPERTENSION, UNSPECIFIED TYPE: Primary | ICD-10-CM

## 2023-03-26 RX ORDER — METOPROLOL TARTRATE 50 MG/1
TABLET ORAL
Qty: 180 TABLET | Refills: 0 | Status: SHIPPED | OUTPATIENT
Start: 2023-03-26 | End: 2024-06-10

## 2023-04-05 LAB
ANION GAP IN SER/PLAS: 16 MMOL/L (ref 10–20)
BASOPHILS (10*3/UL) IN BLOOD BY AUTOMATED COUNT: 0.05 X10E9/L (ref 0–0.1)
BASOPHILS/100 LEUKOCYTES IN BLOOD BY AUTOMATED COUNT: 0.5 % (ref 0–2)
CALCIUM (MG/DL) IN SER/PLAS: 9 MG/DL (ref 8.6–10.3)
CARBON DIOXIDE, TOTAL (MMOL/L) IN SER/PLAS: 21 MMOL/L (ref 21–32)
CHLORIDE (MMOL/L) IN SER/PLAS: 105 MMOL/L (ref 98–107)
CREATININE (MG/DL) IN SER/PLAS: 1.49 MG/DL (ref 0.5–1.05)
EOSINOPHILS (10*3/UL) IN BLOOD BY AUTOMATED COUNT: 0.25 X10E9/L (ref 0–0.4)
EOSINOPHILS/100 LEUKOCYTES IN BLOOD BY AUTOMATED COUNT: 2.7 % (ref 0–6)
ERYTHROCYTE DISTRIBUTION WIDTH (RATIO) BY AUTOMATED COUNT: 14.1 % (ref 11.5–14.5)
ERYTHROCYTE MEAN CORPUSCULAR HEMOGLOBIN CONCENTRATION (G/DL) BY AUTOMATED: 31.8 G/DL (ref 32–36)
ERYTHROCYTE MEAN CORPUSCULAR VOLUME (FL) BY AUTOMATED COUNT: 98 FL (ref 80–100)
ERYTHROCYTES (10*6/UL) IN BLOOD BY AUTOMATED COUNT: 3.67 X10E12/L (ref 4–5.2)
GFR FEMALE: 36 ML/MIN/1.73M2
GLUCOSE (MG/DL) IN SER/PLAS: 273 MG/DL (ref 74–99)
HEMATOCRIT (%) IN BLOOD BY AUTOMATED COUNT: 35.8 % (ref 36–46)
HEMOGLOBIN (G/DL) IN BLOOD: 11.4 G/DL (ref 12–16)
IMMATURE GRANULOCYTES/100 LEUKOCYTES IN BLOOD BY AUTOMATED COUNT: 0.3 % (ref 0–0.9)
LEUKOCYTES (10*3/UL) IN BLOOD BY AUTOMATED COUNT: 9.2 X10E9/L (ref 4.4–11.3)
LYMPHOCYTES (10*3/UL) IN BLOOD BY AUTOMATED COUNT: 3.85 X10E9/L (ref 0.8–3)
LYMPHOCYTES/100 LEUKOCYTES IN BLOOD BY AUTOMATED COUNT: 42.1 % (ref 13–44)
MONOCYTES (10*3/UL) IN BLOOD BY AUTOMATED COUNT: 0.72 X10E9/L (ref 0.05–0.8)
MONOCYTES/100 LEUKOCYTES IN BLOOD BY AUTOMATED COUNT: 7.9 % (ref 2–10)
NEUTROPHILS (10*3/UL) IN BLOOD BY AUTOMATED COUNT: 4.25 X10E9/L (ref 1.6–5.5)
NEUTROPHILS/100 LEUKOCYTES IN BLOOD BY AUTOMATED COUNT: 46.5 % (ref 40–80)
PARATHYRIN INTACT (PG/ML) IN SER/PLAS: 45.9 PG/ML (ref 18.5–88)
PHOSPHATE (MG/DL) IN SER/PLAS: 2.7 MG/DL (ref 2.5–4.9)
PLATELETS (10*3/UL) IN BLOOD AUTOMATED COUNT: 210 X10E9/L (ref 150–450)
POTASSIUM (MMOL/L) IN SER/PLAS: 4.2 MMOL/L (ref 3.5–5.3)
SODIUM (MMOL/L) IN SER/PLAS: 138 MMOL/L (ref 136–145)
UREA NITROGEN (MG/DL) IN SER/PLAS: 23 MG/DL (ref 6–23)

## 2023-05-13 DIAGNOSIS — I10 HYPERTENSION, UNSPECIFIED TYPE: Primary | ICD-10-CM

## 2023-05-15 RX ORDER — LOSARTAN POTASSIUM 50 MG/1
TABLET ORAL
Qty: 180 TABLET | Refills: 0 | Status: SHIPPED | OUTPATIENT
Start: 2023-05-15 | End: 2024-05-11

## 2023-05-23 ENCOUNTER — OFFICE VISIT (OUTPATIENT)
Dept: PRIMARY CARE | Facility: CLINIC | Age: 73
End: 2023-05-23
Payer: MEDICARE

## 2023-05-23 VITALS
DIASTOLIC BLOOD PRESSURE: 76 MMHG | OXYGEN SATURATION: 98 % | SYSTOLIC BLOOD PRESSURE: 152 MMHG | WEIGHT: 149 LBS | HEART RATE: 62 BPM | BODY MASS INDEX: 27.42 KG/M2 | HEIGHT: 62 IN

## 2023-05-23 DIAGNOSIS — F41.9 ANXIETY AND DEPRESSION: ICD-10-CM

## 2023-05-23 DIAGNOSIS — Z72.0 CURRENT NICOTINE USE: ICD-10-CM

## 2023-05-23 DIAGNOSIS — Z79.899 DRUG THERAPY: ICD-10-CM

## 2023-05-23 DIAGNOSIS — E55.9 VITAMIN D DEFICIENCY: ICD-10-CM

## 2023-05-23 DIAGNOSIS — I10 HYPERTENSION, UNSPECIFIED TYPE: ICD-10-CM

## 2023-05-23 DIAGNOSIS — K21.9 GASTROESOPHAGEAL REFLUX DISEASE, UNSPECIFIED WHETHER ESOPHAGITIS PRESENT: ICD-10-CM

## 2023-05-23 DIAGNOSIS — F32.A ANXIETY AND DEPRESSION: ICD-10-CM

## 2023-05-23 DIAGNOSIS — E78.5 HYPERLIPIDEMIA, UNSPECIFIED HYPERLIPIDEMIA TYPE: ICD-10-CM

## 2023-05-23 DIAGNOSIS — N28.9 RENAL INSUFFICIENCY: ICD-10-CM

## 2023-05-23 DIAGNOSIS — E11.9 TYPE 2 DIABETES MELLITUS WITHOUT COMPLICATION, WITHOUT LONG-TERM CURRENT USE OF INSULIN (MULTI): Primary | ICD-10-CM

## 2023-05-23 DIAGNOSIS — J44.9 CHRONIC OBSTRUCTIVE PULMONARY DISEASE, UNSPECIFIED COPD TYPE (MULTI): ICD-10-CM

## 2023-05-23 LAB — POC HEMOGLOBIN A1C: 6.5 % (ref 4.2–6.5)

## 2023-05-23 PROCEDURE — 83036 HEMOGLOBIN GLYCOSYLATED A1C: CPT | Performed by: FAMILY MEDICINE

## 2023-05-23 PROCEDURE — 4010F ACE/ARB THERAPY RXD/TAKEN: CPT | Performed by: FAMILY MEDICINE

## 2023-05-23 PROCEDURE — 4004F PT TOBACCO SCREEN RCVD TLK: CPT | Performed by: FAMILY MEDICINE

## 2023-05-23 PROCEDURE — 3077F SYST BP >= 140 MM HG: CPT | Performed by: FAMILY MEDICINE

## 2023-05-23 PROCEDURE — 99214 OFFICE O/P EST MOD 30 MIN: CPT | Performed by: FAMILY MEDICINE

## 2023-05-23 PROCEDURE — 3078F DIAST BP <80 MM HG: CPT | Performed by: FAMILY MEDICINE

## 2023-05-23 PROCEDURE — 3051F HG A1C>EQUAL 7.0%<8.0%: CPT | Performed by: FAMILY MEDICINE

## 2023-05-23 RX ORDER — PANTOPRAZOLE SODIUM 20 MG/1
20 TABLET, DELAYED RELEASE ORAL
Qty: 90 TABLET | Refills: 3 | Status: SHIPPED | OUTPATIENT
Start: 2023-05-23 | End: 2024-06-10

## 2023-05-23 RX ORDER — GLIPIZIDE 5 MG/1
TABLET ORAL
Qty: 270 TABLET | Refills: 3 | Status: SHIPPED | OUTPATIENT
Start: 2023-05-23

## 2023-05-23 NOTE — PATIENT INSTRUCTIONS
Patient Discussion/Summary     2024 will be next annual preventative.   We will do next [facility] at next appointment.     ----  - Negative screen for hepatitis C August 2017.  - Screening for colon cancer, patient had colonoscopy around 2019, told to come back in 10 years for next, so that will be around 2029.  - Breast cancer history, seeing Humberto Tee and used to see Dr. Ralph but he retired so have new doc. -->> f/u as planned.     Need for immunizations. Up-to-date on coronavirus series including bivalent boosters. Also up-to-date on annual flu shot. Up-to-date on both pneumonia shots. Is also due for tetanus shot as well as the new shingles series. -->> Continue to stay up-to-date on your immunizations.  ---------------------     Overweight, BMI 27, down another 2 LB's last appointment at this office. Weight is pretty stable over the last several years. Patient does note she is eating a little better.-->> Reminded about cutting back on simple carbohydrates like bread, pasta, potatoes, rice, sugars in general.     Smoking about half pack a day, down from a pack or more daily -->> keep up the good work cutting back. Of course we do advise you to completely quit.      Cardiac CT calcium scoring result was a zero, excellent. Pt does have h/o carotid artery stenosis, holosystolic murmur. -->> Follow-up with cardiology/Dr. Parson.     New labs reviewed:  - Type 2 diabetes, A1c is 6.5, was 7.2, 7.7, 7.0, 6.8, 6.4, 6.6, 6.7, 6.4, 5.8, 5.3, 6.1. We will just recheck in about 3 months. Metformin discontinued due to renal insufficiency. On glipizide 5mg 1-3 times per day, but in general has pretty much been taken 3 times daily. Has not been having any problems with sugar going too low. Patient's daughter did have a type of thyroid cancer, not sure what type. Never started Januvia, it was not covered. -->>  Continue glipizide.  - Vitamin D deficiency, 38, was 37, 57, 54, 60. Goal is . On daily vitamin D, our best  guess is she is taking 2000 units, that is 1000 units 2 pills daily. -->> Recommend you increase your dose to 4000 or 5000 units daily. We will recheck with annual labs in about 6 months.  - Hyperlipidemia, HDL 39, was 35, 34, 40, 38, 38, 40, goal is over 45. LDL is 27, was 43, 36, 185, 142, 152, 148, 63, goal is less than 70, so your cholesterol numbers are excellent. On rosuvastatin/Crestor 20 mg daily along with coenzyme Q 10 over-the-counter. -->> Continue current treatment. Will recheck with next annual labs.  - Renal insufficiency, estimated GFR 37, was 34, 39, 35, 35, 34, 36, improving. Seeing Dr. Garcia. -->> f/u with nephrology as planned. Looks like patient is getting labs with nephrology every 4 months. We will try to synchronize our labs around April with nephrology labs     Hypertension, blood pressure borderline today. In general numbers pretty good on chart. -->> Follow-up with cardiology as planned.      COPD, history of AECOPD end of 2021, but not since in general doing well, not needing the albuterol since early 2022.      Anxiety and depression. Patient does well without meds, with the assistance of her cat. The cat helps her perform activities of daily living without getting as stressed. Patient is definitely calmer and less stressed with cat around. -->> we will sign papers for [facility] stating patient has a support animal. Or could write a letter if needed.     GERD.  Worse lately.  Used to be on pantoprazole and did well.  Discontinued because she had been doing well enough for quite a while and thought she try without it.  Symptoms returned. -->>  Restart pantoprazole at 20 mg daily.    Right shoulder pain, right biceps type.  Did something to it maybe 3 weeks ago and now is bothering her. -->>  Try Tylenol 1000 mg up to 3 times daily.  Try heat or cold on it.  If not improving or gets worse, consider seeing orthopedics for further evaluation and treatment.        Return in 3 months for  Medicare wellness visit and lab review.  Turn here about a week before the appointment to get A1c and vitamin D levels drawn.

## 2023-05-23 NOTE — PROGRESS NOTES
Pt in today for 3 month FU / IO a1c    Subjective   Patient ID: Alma Lane is a 73 y.o. female who presents for 3 month FU / IO A1c (Pt in today for 3 month FU / IO a1c).    Review of Systems  Denies N/V/D/C, no HA/S/V, denies rashes/lesions, no CP.  For some shortness of breath with exertion. Denies fevers/chills.  Positive for right shoulder discomfort and limited range of motion.  All other systems were negative.    Objective   Physical Exam    Assessment/Plan   Diagnoses and all orders for this visit:  Type 2 diabetes mellitus without complication, without long-term current use of insulin (CMS/[facility])  -     glipiZIDE (Glucotrol) 5 mg tablet; 1 by mouth up to 3 times daily as needed and directed.  -     Hemoglobin A1c; Future  Drug therapy  -     POCT glycosylated hemoglobin (Hb A1C) manually resulted  Vitamin D deficiency  -     Vitamin D 25-Hydroxy,Total; Future  Hyperlipidemia, unspecified hyperlipidemia type  Renal insufficiency  Hypertension, unspecified type  Anxiety and depression  Chronic obstructive pulmonary disease, unspecified COPD type (CMS/[facility])  Current nicotine use  Gastroesophageal reflux disease, unspecified whether esophagitis present  -     pantoprazole (Protonix) 20 mg EC tablet; Take 1 tablet (20 mg) by mouth once daily in the morning. Take before meals. Do not crush, chew, or split.           Patient Discussion/Summary     2024 will be next annual preventative.   We will do next [facility] at next appointment.     ----  - Negative screen for hepatitis C August 2017.  - Screening for colon cancer, patient had colonoscopy around 2019, told to come back in 10 years for next, so that will be around 2029.  - Breast cancer history, seeing Humberto Tee and used to see Dr. Ralph but he retired so have new doc. -->> f/u as planned.     Need for immunizations. Up-to-date on coronavirus series including bivalent boosters. Also up-to-date on annual flu shot. Up-to-date on both pneumonia shots. Is also due  for tetanus shot as well as the new shingles series. -->> Continue to stay up-to-date on your immunizations.  ---------------------     Overweight, BMI 27, down another 2 LB's last appointment at this office. Weight is pretty stable over the last several years. Patient does note she is eating a little better.-->> Reminded about cutting back on simple carbohydrates like bread, pasta, potatoes, rice, sugars in general.     Smoking about half pack a day, down from a pack or more daily -->> keep up the good work cutting back. Of course we do advise you to completely quit.      Cardiac CT calcium scoring result was a zero, excellent. Pt does have h/o carotid artery stenosis, holosystolic murmur. -->> Follow-up with cardiology/Dr. Parson.     New labs reviewed:  - Type 2 diabetes, A1c is 6.5, was 7.2, 7.7, 7.0, 6.8, 6.4, 6.6, 6.7, 6.4, 5.8, 5.3, 6.1. We will just recheck in about 3 months. Metformin discontinued due to renal insufficiency. On glipizide 5mg 1-3 times per day, but in general has pretty much been taken 3 times daily. Has not been having any problems with sugar going too low. Patient's daughter did have a type of thyroid cancer, not sure what type. Never started Januvia, it was not covered. -->>  Continue glipizide.  - Vitamin D deficiency, 38, was 37, 57, 54, 60. Goal is . On daily vitamin D, our best guess is she is taking 2000 units, that is 1000 units 2 pills daily. -->> Recommend you increase your dose to 4000 or 5000 units daily. We will recheck with annual labs in about 6 months.  - Hyperlipidemia, HDL 39, was 35, 34, 40, 38, 38, 40, goal is over 45. LDL is 27, was 43, 36, 185, 142, 152, 148, 63, goal is less than 70, so your cholesterol numbers are excellent. On rosuvastatin/Crestor 20 mg daily along with coenzyme Q 10 over-the-counter. -->> Continue current treatment. Will recheck with next annual labs.  - Renal insufficiency, estimated GFR 37, was 34, 39, 35, 35, 34, 36, improving. Seeing   Jose. -->> f/u with nephrology as planned. Looks like patient is getting labs with nephrology every 4 months. We will try to synchronize our labs around April with nephrology labs     Hypertension, blood pressure borderline today. In general numbers pretty good on chart. -->> Follow-up with cardiology as planned.      COPD, history of AECOPD end of 2021, but not since in general doing well, not needing the albuterol since early 2022.      Anxiety and depression. Patient does well without meds, with the assistance of her cat. The cat helps her perform activities of daily living without getting as stressed. Patient is definitely calmer and less stressed with cat around. -->> we will sign papers for [facility] stating patient has a support animal. Or could write a letter if needed.     GERD.  Worse lately.  Used to be on pantoprazole and did well.  Discontinued because she had been doing well enough for quite a while and thought she try without it.  Symptoms returned. -->>  Restart pantoprazole at 20 mg daily.    Right shoulder pain, right biceps type.  Did something to it maybe 3 weeks ago and now is bothering her. -->>  Try Tylenol 1000 mg up to 3 times daily.  Try heat or cold on it.  If not improving or gets worse, consider seeing orthopedics for further evaluation and treatment.        - Return in 3 months for Medicare wellness visit and lab review.  - return here about a week before the appointment to get A1c and vitamin D levels drawn.

## 2023-08-09 ENCOUNTER — TELEPHONE (OUTPATIENT)
Dept: PRIMARY CARE | Facility: CLINIC | Age: 73
End: 2023-08-09

## 2023-08-09 ENCOUNTER — LAB (OUTPATIENT)
Dept: LAB | Facility: LAB | Age: 73
End: 2023-08-09
Payer: MEDICARE

## 2023-08-09 DIAGNOSIS — E55.9 VITAMIN D DEFICIENCY: ICD-10-CM

## 2023-08-09 DIAGNOSIS — E11.9 TYPE 2 DIABETES MELLITUS WITHOUT COMPLICATION, WITHOUT LONG-TERM CURRENT USE OF INSULIN (MULTI): ICD-10-CM

## 2023-08-09 LAB
ANION GAP IN SER/PLAS: 14 MMOL/L (ref 10–20)
BASOPHILS (10*3/UL) IN BLOOD BY AUTOMATED COUNT: 0.06 X10E9/L (ref 0–0.1)
BASOPHILS/100 LEUKOCYTES IN BLOOD BY AUTOMATED COUNT: 0.6 % (ref 0–2)
CALCIDIOL (25 OH VITAMIN D3) (NG/ML) IN SER/PLAS: 63 NG/ML
CALCIUM (MG/DL) IN SER/PLAS: 8.9 MG/DL (ref 8.6–10.3)
CARBON DIOXIDE, TOTAL (MMOL/L) IN SER/PLAS: 25 MMOL/L (ref 21–32)
CHLORIDE (MMOL/L) IN SER/PLAS: 105 MMOL/L (ref 98–107)
CREATININE (MG/DL) IN SER/PLAS: 1.53 MG/DL (ref 0.5–1.05)
EOSINOPHILS (10*3/UL) IN BLOOD BY AUTOMATED COUNT: 0.29 X10E9/L (ref 0–0.4)
EOSINOPHILS/100 LEUKOCYTES IN BLOOD BY AUTOMATED COUNT: 3.1 % (ref 0–6)
ERYTHROCYTE DISTRIBUTION WIDTH (RATIO) BY AUTOMATED COUNT: 14.2 % (ref 11.5–14.5)
ERYTHROCYTE MEAN CORPUSCULAR HEMOGLOBIN CONCENTRATION (G/DL) BY AUTOMATED: 31.1 G/DL (ref 32–36)
ERYTHROCYTE MEAN CORPUSCULAR VOLUME (FL) BY AUTOMATED COUNT: 99 FL (ref 80–100)
ERYTHROCYTES (10*6/UL) IN BLOOD BY AUTOMATED COUNT: 3.8 X10E12/L (ref 4–5.2)
ESTIMATED AVERAGE GLUCOSE FOR HBA1C: 163 MG/DL
GFR FEMALE: 35 ML/MIN/1.73M2
GLUCOSE (MG/DL) IN SER/PLAS: 167 MG/DL (ref 74–99)
HEMATOCRIT (%) IN BLOOD BY AUTOMATED COUNT: 37.6 % (ref 36–46)
HEMOGLOBIN (G/DL) IN BLOOD: 11.7 G/DL (ref 12–16)
HEMOGLOBIN A1C/HEMOGLOBIN TOTAL IN BLOOD: 7.3 %
IMMATURE GRANULOCYTES/100 LEUKOCYTES IN BLOOD BY AUTOMATED COUNT: 0.2 % (ref 0–0.9)
LEUKOCYTES (10*3/UL) IN BLOOD BY AUTOMATED COUNT: 9.4 X10E9/L (ref 4.4–11.3)
LYMPHOCYTES (10*3/UL) IN BLOOD BY AUTOMATED COUNT: 4.45 X10E9/L (ref 0.8–3)
LYMPHOCYTES/100 LEUKOCYTES IN BLOOD BY AUTOMATED COUNT: 47.4 % (ref 13–44)
MONOCYTES (10*3/UL) IN BLOOD BY AUTOMATED COUNT: 0.96 X10E9/L (ref 0.05–0.8)
MONOCYTES/100 LEUKOCYTES IN BLOOD BY AUTOMATED COUNT: 10.2 % (ref 2–10)
NEUTROPHILS (10*3/UL) IN BLOOD BY AUTOMATED COUNT: 3.6 X10E9/L (ref 1.6–5.5)
NEUTROPHILS/100 LEUKOCYTES IN BLOOD BY AUTOMATED COUNT: 38.5 % (ref 40–80)
PARATHYRIN INTACT (PG/ML) IN SER/PLAS: 87.2 PG/ML (ref 18.5–88)
PHOSPHATE (MG/DL) IN SER/PLAS: 3.4 MG/DL (ref 2.5–4.9)
PLATELETS (10*3/UL) IN BLOOD AUTOMATED COUNT: 196 X10E9/L (ref 150–450)
POTASSIUM (MMOL/L) IN SER/PLAS: 5.1 MMOL/L (ref 3.5–5.3)
SODIUM (MMOL/L) IN SER/PLAS: 139 MMOL/L (ref 136–145)
UREA NITROGEN (MG/DL) IN SER/PLAS: 19 MG/DL (ref 6–23)

## 2023-08-09 PROCEDURE — 36415 COLL VENOUS BLD VENIPUNCTURE: CPT

## 2023-08-09 PROCEDURE — 82306 VITAMIN D 25 HYDROXY: CPT

## 2023-08-09 PROCEDURE — 83036 HEMOGLOBIN GLYCOSYLATED A1C: CPT

## 2023-08-15 ENCOUNTER — APPOINTMENT (OUTPATIENT)
Dept: PRIMARY CARE | Facility: CLINIC | Age: 73
End: 2023-08-15
Payer: MEDICARE

## 2023-08-22 ENCOUNTER — TELEPHONE (OUTPATIENT)
Dept: PRIMARY CARE | Facility: CLINIC | Age: 73
End: 2023-08-22

## 2023-08-22 ENCOUNTER — OFFICE VISIT (OUTPATIENT)
Dept: PRIMARY CARE | Facility: CLINIC | Age: 73
End: 2023-08-22
Payer: MEDICARE

## 2023-08-22 VITALS
OXYGEN SATURATION: 97 % | RESPIRATION RATE: 22 BRPM | WEIGHT: 149.6 LBS | HEIGHT: 62 IN | HEART RATE: 85 BPM | DIASTOLIC BLOOD PRESSURE: 66 MMHG | SYSTOLIC BLOOD PRESSURE: 121 MMHG | BODY MASS INDEX: 27.53 KG/M2

## 2023-08-22 DIAGNOSIS — E11.9 TYPE 2 DIABETES MELLITUS WITHOUT COMPLICATION, WITHOUT LONG-TERM CURRENT USE OF INSULIN (MULTI): ICD-10-CM

## 2023-08-22 DIAGNOSIS — F32.A ANXIETY AND DEPRESSION: ICD-10-CM

## 2023-08-22 DIAGNOSIS — F41.9 ANXIETY AND DEPRESSION: ICD-10-CM

## 2023-08-22 DIAGNOSIS — Z00.00 ROUTINE GENERAL MEDICAL EXAMINATION AT HEALTH CARE FACILITY: Primary | ICD-10-CM

## 2023-08-22 DIAGNOSIS — K21.9 GASTROESOPHAGEAL REFLUX DISEASE, UNSPECIFIED WHETHER ESOPHAGITIS PRESENT: ICD-10-CM

## 2023-08-22 DIAGNOSIS — E78.5 HYPERLIPIDEMIA, UNSPECIFIED HYPERLIPIDEMIA TYPE: ICD-10-CM

## 2023-08-22 DIAGNOSIS — J44.9 CHRONIC OBSTRUCTIVE PULMONARY DISEASE, UNSPECIFIED COPD TYPE (MULTI): ICD-10-CM

## 2023-08-22 DIAGNOSIS — E55.9 VITAMIN D DEFICIENCY: ICD-10-CM

## 2023-08-22 DIAGNOSIS — I10 HYPERTENSION, UNSPECIFIED TYPE: ICD-10-CM

## 2023-08-22 DIAGNOSIS — N28.9 RENAL INSUFFICIENCY: ICD-10-CM

## 2023-08-22 PROCEDURE — 1160F RVW MEDS BY RX/DR IN RCRD: CPT | Performed by: FAMILY MEDICINE

## 2023-08-22 PROCEDURE — 1159F MED LIST DOCD IN RCRD: CPT | Performed by: FAMILY MEDICINE

## 2023-08-22 PROCEDURE — 1170F FXNL STATUS ASSESSED: CPT | Performed by: FAMILY MEDICINE

## 2023-08-22 PROCEDURE — 4010F ACE/ARB THERAPY RXD/TAKEN: CPT | Performed by: FAMILY MEDICINE

## 2023-08-22 PROCEDURE — 3078F DIAST BP <80 MM HG: CPT | Performed by: FAMILY MEDICINE

## 2023-08-22 PROCEDURE — 3074F SYST BP LT 130 MM HG: CPT | Performed by: FAMILY MEDICINE

## 2023-08-22 PROCEDURE — 99214 OFFICE O/P EST MOD 30 MIN: CPT | Performed by: FAMILY MEDICINE

## 2023-08-22 PROCEDURE — 3051F HG A1C>EQUAL 7.0%<8.0%: CPT | Performed by: FAMILY MEDICINE

## 2023-08-22 PROCEDURE — G0439 PPPS, SUBSEQ VISIT: HCPCS | Performed by: FAMILY MEDICINE

## 2023-08-22 PROCEDURE — 4004F PT TOBACCO SCREEN RCVD TLK: CPT | Performed by: FAMILY MEDICINE

## 2023-08-22 RX ORDER — ACARBOSE 25 MG/1
25 TABLET ORAL
Qty: 90 TABLET | Refills: 1 | Status: SHIPPED | OUTPATIENT
Start: 2023-08-22 | End: 2023-11-27 | Stop reason: SINTOL

## 2023-08-22 ASSESSMENT — ENCOUNTER SYMPTOMS
LOSS OF SENSATION IN FEET: 0
DEPRESSION: 0
OCCASIONAL FEELINGS OF UNSTEADINESS: 0

## 2023-08-22 ASSESSMENT — ACTIVITIES OF DAILY LIVING (ADL)
MANAGING_FINANCES: INDEPENDENT
GROCERY_SHOPPING: INDEPENDENT
DRESSING: INDEPENDENT
DOING_HOUSEWORK: INDEPENDENT
BATHING: INDEPENDENT
TAKING_MEDICATION: INDEPENDENT

## 2023-08-22 NOTE — PROGRESS NOTES
"  Subjective   Reason for Visit: Alma Lane is an 73 y.o. female here for a Medicare Wellness visit.          Reviewed all medications by prescribing practitioner or clinical pharmacist (such as prescriptions, OTCs, herbal therapies and supplements) and documented in the medical record.      Patient Care Team:  Yogesh Garrett DO as PCP - General  Yogesh Garrett DO as PCP - [employer] PCP     Review of Systems  Denies N/V/D/C, no HA/S/V, denies rashes/lesions, no CP/SOB. Denies fevers/chills.  All other systems were negative.    Objective   Vitals:  /66 (BP Location: Left arm, Patient Position: Sitting, BP Cuff Size: Adult)   Pulse 85   Resp 22   Ht 1.575 m (5' 2\")   Wt 67.9 kg (149 lb 9.6 oz)   SpO2 97%   BMI 27.36 kg/m²       Physical Exam  Gen: NAD  eyes: EOMI, PERRLA  ENT: hearing grossly intact, no nasal discharge  resp: CTABL, without R/R  heart: RRR without MRG  GI: abd: S/ND/NT, BS+  lymph: no axillary, cervical, supraclavicular lymphadenopathy noted   MS: gait grossly WNL,  derm: no rashes or lesions noted  neuro: CN II-XII grossly intact  psych: A&Ox3    Assessment/Plan   Problem List Items Addressed This Visit       Type 2 diabetes mellitus without complication, without long-term current use of insulin (CMS/[facility])    Relevant Medications    acarbose (Precose) 25 mg tablet    Vitamin D deficiency    Hyperlipidemia    Renal insufficiency    Hypertension    Anxiety and depression    Chronic obstructive pulmonary disease (CMS/[facility])     Other Visit Diagnoses       Routine general medical examination at health care facility    -  Primary    Gastroesophageal reflux disease, unspecified whether esophagitis present                             Patient is here for [facility]    Patient Discussion/Summary     2024 will be next annual preventative.   [facility]      ----  - Negative screen for hepatitis C August 2017.  - Screening for colon cancer, patient had colonoscopy around 2019, told " to come back in 10 years for next, so that will be around 2029.  - Breast cancer history, seeing Humberto Tee and used to see Dr. Ralph but he retired so have new doc. -->> f/u as planned.     Need for immunizations. Up-to-date on coronavirus series including bivalent boosters. Also up-to-date on annual flu shot. Up-to-date on both pneumonia shots. Is also due for tetanus shot as well as the new shingles series. -->> Continue to stay up-to-date on your immunizations.  ---------------------     Overweight, BMI 27, about same as last time. Weight is pretty stable over the last several years. Patient does note she is eating a little better.-->> Reminded about cutting back on simple carbohydrates like bread, pasta, potatoes, rice, sugars in general.     Quit smoking 7/26/23. Down from half pack a day, down from a pack or more daily -->> keep up the excellent work!      Cardiac CT calcium scoring result was a zero, excellent. Pt does have h/o carotid artery stenosis, holosystolic murmur. -->> Follow-up with cardiology/Dr. Parson.     New labs reviewed:  - Type 2 diabetes, A1c is 7.3, was 6.5, 7.2, 7.7, 7.0, 6.8, 6.4, 6.6, 6.7, 6.4, 5.8, 5.3, 6.1. We will just recheck in about 3 months. On glipizide 5mg 3 times per day, but in general has pretty much been taken 3 times daily, usually only takes it when sugar is above 150. Has occasional problems with sugar going too low. Patient's daughter did have a type of thyroid cancer, not sure what type. Never started Januvia, it was not covered. -->>  Continue glipizide.  Will add low-dose acarbose to try to lower sugars a little more.  - Vitamin D deficiency, 63, was 38, 37, 57, 54, 60. Goal is . On daily vitamin D, our best guess is she is taking 2000 units, that is 1000 units 2 pills daily. -->> We will recheck with annual labs in about 6 months.  - Hyperlipidemia, HDL 39, was 35, 34, 40, 38, 38, 40, goal is over 45. LDL is 27, was 43, 36, 185, 142, 152, 148, 63, goal  is less than 70, so your cholesterol numbers are excellent. On rosuvastatin/Crestor 20 mg daily along with coenzyme Q 10 over-the-counter. -->> Continue current treatment. Will recheck with next annual labs.    Regarding previous labs   - Renal insufficiency, estimated GFR 37, was 34, 39, 35, 35, 34, 36, improving. Seeing Dr. Garcia. -->> f/u with nephrology as planned. Looks like patient is getting labs with nephrology every 4 months. We will try to synchronize our labs around April with nephrology labs     Hypertension, blood pressure good today. In general numbers pretty good on chart. -->> Follow-up with cardiology as planned.      COPD, history of AECOPD end of 2021, but not since in general doing well, not needing the albuterol since early 2022.      Anxiety and depression. Patient does well without meds, with the assistance of her cats. The cats helps her perform activities of daily living without getting as stressed. Patient is definitely calmer and less stressed with cat around. -->> we will sign papers as needed for [employer] stating patient has a support animal. Or could write a letter if needed.     GERD.  Doing well since she restarted the pantoprazole. -->> continue pantoprazole 20 mg daily.        - Patient will call us in the next couple days to tell us when her next labs are for nephrology and when her next appointment is, and we will schedule our annual labs to be done about the same time with our annual lab review.

## 2023-08-22 NOTE — PATIENT INSTRUCTIONS
Patient is here for [facility]    Patient Discussion/Summary     2024 will be next annual preventative.   [facility]      ----  - Negative screen for hepatitis C August 2017.  - Screening for colon cancer, patient had colonoscopy around 2019, told to come back in 10 years for next, so that will be around 2029.  - Breast cancer history, seeing Humberto Tee and used to see Dr. Ralph but he retired so have new doc. -->> f/u as planned.     Need for immunizations. Up-to-date on coronavirus series including bivalent boosters. Also up-to-date on annual flu shot. Up-to-date on both pneumonia shots. Is also due for tetanus shot as well as the new shingles series. -->> Continue to stay up-to-date on your immunizations.  ---------------------     Overweight, BMI 27, about same as last time. Weight is pretty stable over the last several years. Patient does note she is eating a little better.-->> Reminded about cutting back on simple carbohydrates like bread, pasta, potatoes, rice, sugars in general.     Quit smoking 7/26/23. Down from half pack a day, down from a pack or more daily -->> keep up the excellent work!      Cardiac CT calcium scoring result was a zero, excellent. Pt does have h/o carotid artery stenosis, holosystolic murmur. -->> Follow-up with cardiology/Dr. Parson.     New labs reviewed:  - Type 2 diabetes, A1c is 7.3, was 6.5, 7.2, 7.7, 7.0, 6.8, 6.4, 6.6, 6.7, 6.4, 5.8, 5.3, 6.1. We will just recheck in about 3 months. On glipizide 5mg 3 times per day, but in general has pretty much been taken 3 times daily, usually only takes it when sugar is above 150. Has occasional problems with sugar going too low. Patient's daughter did have a type of thyroid cancer, not sure what type. Never started Januvia, it was not covered. -->>  Continue glipizide.  Will add low-dose acarbose to try to lower sugars a little more.  - Vitamin D deficiency, 63, was 38, 37, 57, 54, 60. Goal is . On daily vitamin D, our best guess is  she is taking 2000 units, that is 1000 units 2 pills daily. -->> We will recheck with annual labs in about 6 months.  - Hyperlipidemia, HDL 39, was 35, 34, 40, 38, 38, 40, goal is over 45. LDL is 27, was 43, 36, 185, 142, 152, 148, 63, goal is less than 70, so your cholesterol numbers are excellent. On rosuvastatin/Crestor 20 mg daily along with coenzyme Q 10 over-the-counter. -->> Continue current treatment. Will recheck with next annual labs.    Regarding previous labs   - Renal insufficiency, estimated GFR 37, was 34, 39, 35, 35, 34, 36, improving. Seeing Dr. Garcia. -->> f/u with nephrology as planned. Looks like patient is getting labs with nephrology every 4 months. We will try to synchronize our labs around April with nephrology labs     Hypertension, blood pressure good today. In general numbers pretty good on chart. -->> Follow-up with cardiology as planned.      COPD, history of AECOPD end of 2021, but not since in general doing well, not needing the albuterol since early 2022.      Anxiety and depression. Patient does well without meds, with the assistance of her cats. The cats helps her perform activities of daily living without getting as stressed. Patient is definitely calmer and less stressed with cat around. -->> we will sign papers as needed for landlord stating patient has a support animal. Or could write a letter if needed.     GERD.  Doing well since she restarted the pantoprazole. -->> continue pantoprazole 20 mg daily.        - Patient will call us in the next couple days to tell us when her next labs are for nephrology and when her next appointment is, and we will schedule our annual labs to be done about the same time with our annual lab review.

## 2023-08-26 DIAGNOSIS — Z79.899 DRUG THERAPY: Primary | ICD-10-CM

## 2023-08-26 DIAGNOSIS — E78.5 HYPERLIPIDEMIA, UNSPECIFIED HYPERLIPIDEMIA TYPE: ICD-10-CM

## 2023-08-26 DIAGNOSIS — Z00.00 PREVENTATIVE HEALTH CARE: ICD-10-CM

## 2023-08-26 DIAGNOSIS — E55.9 VITAMIN D DEFICIENCY: ICD-10-CM

## 2023-08-26 DIAGNOSIS — Z13.9 SCREENING FOR CONDITION: ICD-10-CM

## 2023-08-26 NOTE — TELEPHONE ENCOUNTER
I ordered labs for her. Please ask where she gets them done. If they are [facility] they're already in there for approx 12/7/23. If they are elsewhere please print and mail her orders.    Also, please schedule her for early January for [facility] and preventative and annual lab review. Thanks.

## 2023-09-11 ENCOUNTER — TELEPHONE (OUTPATIENT)
Dept: PRIMARY CARE | Facility: CLINIC | Age: 73
End: 2023-09-11
Payer: MEDICARE

## 2023-09-11 NOTE — TELEPHONE ENCOUNTER
Pt came in today, she is asking for a referral to the infusion center for her port (for chemo) to be flushed. Pt states  cannot take a referral from [facility] any longer and she will not be able to get port flushed.   Says [facility] is too far for her to travel, she likes being at  for due to some type of insurance issue she can no longer use the referral from her [facility] oncologist.   Please advise.

## 2023-10-26 PROBLEM — Z45.2 ENCOUNTER FOR CARE RELATED TO VASCULAR ACCESS PORT: Status: ACTIVE | Noted: 2023-10-26

## 2023-10-26 RX ORDER — HEPARIN 100 UNIT/ML
500 SYRINGE INTRAVENOUS ONCE
Status: CANCELLED | OUTPATIENT
Start: 2023-11-22 | End: 2023-11-22

## 2023-11-16 ENCOUNTER — APPOINTMENT (OUTPATIENT)
Dept: CARDIOLOGY | Facility: CLINIC | Age: 73
End: 2023-11-16
Payer: MEDICARE

## 2023-11-22 ENCOUNTER — INFUSION (OUTPATIENT)
Dept: HEMATOLOGY/ONCOLOGY | Facility: CLINIC | Age: 73
End: 2023-11-22
Payer: MEDICARE

## 2023-11-22 DIAGNOSIS — Z45.2 ENCOUNTER FOR CARE RELATED TO VASCULAR ACCESS PORT: ICD-10-CM

## 2023-11-22 PROCEDURE — 2500000004 HC RX 250 GENERAL PHARMACY W/ HCPCS (ALT 636 FOR OP/ED): Performed by: SURGERY

## 2023-11-22 PROCEDURE — 96523 IRRIG DRUG DELIVERY DEVICE: CPT

## 2023-11-22 RX ORDER — HEPARIN 100 UNIT/ML
5 SYRINGE INTRAVENOUS ONCE
Status: COMPLETED | OUTPATIENT
Start: 2023-11-22 | End: 2023-11-22

## 2023-11-22 RX ADMIN — HEPARIN 500 UNITS: 100 SYRINGE at 09:03

## 2023-11-27 ENCOUNTER — OFFICE VISIT (OUTPATIENT)
Dept: CARDIOLOGY | Facility: CLINIC | Age: 73
End: 2023-11-27
Payer: MEDICARE

## 2023-11-27 VITALS
HEIGHT: 62 IN | HEART RATE: 65 BPM | DIASTOLIC BLOOD PRESSURE: 64 MMHG | BODY MASS INDEX: 27.86 KG/M2 | SYSTOLIC BLOOD PRESSURE: 130 MMHG | WEIGHT: 151.4 LBS

## 2023-11-27 DIAGNOSIS — I10 PRIMARY HYPERTENSION: ICD-10-CM

## 2023-11-27 DIAGNOSIS — R06.02 SHORTNESS OF BREATH: ICD-10-CM

## 2023-11-27 DIAGNOSIS — Z72.0 CURRENT NICOTINE USE: ICD-10-CM

## 2023-11-27 DIAGNOSIS — E11.9 TYPE 2 DIABETES MELLITUS WITHOUT COMPLICATION, WITHOUT LONG-TERM CURRENT USE OF INSULIN (MULTI): ICD-10-CM

## 2023-11-27 DIAGNOSIS — R09.89 BILATERAL CAROTID BRUITS: ICD-10-CM

## 2023-11-27 DIAGNOSIS — E78.2 MIXED HYPERLIPIDEMIA: ICD-10-CM

## 2023-11-27 PROCEDURE — 4004F PT TOBACCO SCREEN RCVD TLK: CPT | Performed by: INTERNAL MEDICINE

## 2023-11-27 PROCEDURE — 99214 OFFICE O/P EST MOD 30 MIN: CPT | Performed by: INTERNAL MEDICINE

## 2023-11-27 PROCEDURE — 1159F MED LIST DOCD IN RCRD: CPT | Performed by: INTERNAL MEDICINE

## 2023-11-27 PROCEDURE — 3075F SYST BP GE 130 - 139MM HG: CPT | Performed by: INTERNAL MEDICINE

## 2023-11-27 PROCEDURE — 3051F HG A1C>EQUAL 7.0%<8.0%: CPT | Performed by: INTERNAL MEDICINE

## 2023-11-27 PROCEDURE — 1160F RVW MEDS BY RX/DR IN RCRD: CPT | Performed by: INTERNAL MEDICINE

## 2023-11-27 PROCEDURE — 3078F DIAST BP <80 MM HG: CPT | Performed by: INTERNAL MEDICINE

## 2023-11-27 PROCEDURE — 4010F ACE/ARB THERAPY RXD/TAKEN: CPT | Performed by: INTERNAL MEDICINE

## 2023-11-27 RX ORDER — NAPROXEN SODIUM 220 MG/1
1 TABLET, FILM COATED ORAL DAILY
COMMUNITY

## 2023-11-27 RX ORDER — PNV NO.95/FERROUS FUM/FOLIC AC 28MG-0.8MG
100 TABLET ORAL DAILY
COMMUNITY

## 2023-11-27 RX ORDER — CHOLECALCIFEROL (VITAMIN D3) 125 MCG
125 CAPSULE ORAL DAILY
COMMUNITY

## 2023-11-27 RX ORDER — CHOLECALCIFEROL (VITAMIN D3) 125 MCG
1 CAPSULE ORAL DAILY
COMMUNITY

## 2023-11-27 NOTE — PROGRESS NOTES
Patient:  Alma Lane  YOB: 1950  MRN: [id number]       HPI:       Alma Lane is a 73 y.o. female who returns today for cardiac follow-up.  Patient has no clinical complaints.  She does have significant valvular disease and vascular disease including carotid disease.  She is due for her cardiac noninvasive studies which were planning for in the spring.      Objective:     There were no vitals filed for this visit.    Wt Readings from Last 4 Encounters:   08/22/23 67.9 kg (149 lb 9.6 oz)   05/23/23 67.6 kg (149 lb)   05/15/23 67.8 kg (149 lb 6 oz)   02/21/23 68.6 kg (151 lb 5 oz)       Allergies:     Allergies   Allergen Reactions    Atorvastatin Hives and Other    Ondansetron Hives    Simvastatin Other        Medications:     Current Outpatient Medications   Medication Instructions    acarbose (PRECOSE) 25 mg, oral, 3 times daily with meals    glipiZIDE (Glucotrol) 5 mg tablet 1 by mouth up to 3 times daily as needed and directed.    losartan (Cozaar) 50 mg tablet TAKE ONE TABLET BY MOUTH TWO TIMES A DAY    metoprolol tartrate (Lopressor) 50 mg tablet TAKE ONE TABLET BY MOUTH TWO TIMES A DAY    pantoprazole (PROTONIX) 20 mg, oral, Daily before breakfast, Do not crush, chew, or split.        Physical Examination:     Constitutional:       Appearance: Healthy appearance. Not in distress.   Neck:      Vascular: No JVR. JVD normal.   Pulmonary:      Effort: Pulmonary effort is normal.      Breath sounds: Normal breath sounds. No wheezing. No rhonchi. No rales.   Chest:      Chest wall: Not tender to palpatation.   Cardiovascular:      PMI at left midclavicular line. Normal rate. Regular rhythm. Normal S1. Normal S2.       Murmurs: There is a grade 3/6 systolic murmur at the URSB and ULSB, radiating to the neck.      No gallop.  No click. No rub.   Pulses:     Intact distal pulses.   Edema:     Peripheral edema absent.   Abdominal:      General: Bowel sounds are normal.      Palpations: Abdomen is  "soft.      Tenderness: There is no abdominal tenderness.   Musculoskeletal: Normal range of motion.         General: No tenderness. Skin:     General: Skin is warm and dry.   Neurological:      General: No focal deficit present.      Mental Status: Alert and oriented to person, place and time.          Lab:     CBC:   Lab Results   Component Value Date    WBC 9.4 08/09/2023    RBC 3.80 (L) 08/09/2023    HGB 11.7 (L) 08/09/2023    HCT 37.6 08/09/2023     08/09/2023        CMP:    Lab Results   Component Value Date     08/09/2023    K 5.1 08/09/2023     08/09/2023    CO2 25 08/09/2023    BUN 19 08/09/2023    CREATININE 1.53 (H) 08/09/2023    GLUCOSE 167 (H) 08/09/2023    CALCIUM 8.9 08/09/2023       Magnesium:    Lab Results   Component Value Date    MG 1.90 10/07/2022       Lipid Profile:    Lab Results   Component Value Date    TRIG 266 (H) 10/07/2022    HDL 39.0 (A) 10/07/2022       TSH:    Lab Results   Component Value Date    TSH 1.64 10/07/2022       BNP:   Lab Results   Component Value Date     (H) 09/06/2021        PT/INR:    No results found for: \"PROTIME\", \"INR\"    HgBA1c:    Lab Results   Component Value Date    HGBA1C 7.3 (A) 08/09/2023       BMP:  Lab Results   Component Value Date     08/09/2023     04/05/2023     01/05/2023    K 5.1 08/09/2023    K 4.2 04/05/2023    K 3.9 01/05/2023     08/09/2023     04/05/2023     01/05/2023    CO2 25 08/09/2023    CO2 21 04/05/2023    CO2 26 01/05/2023    BUN 19 08/09/2023    BUN 23 04/05/2023    BUN 23 01/05/2023    CREATININE 1.53 (H) 08/09/2023    CREATININE 1.49 (H) 04/05/2023    CREATININE 1.47 (H) 01/05/2023       CBC:  Lab Results   Component Value Date    WBC 9.4 08/09/2023    WBC 9.2 04/05/2023    WBC 10.6 01/05/2023    RBC 3.80 (L) 08/09/2023    RBC 3.67 (L) 04/05/2023    RBC 3.87 (L) 01/05/2023    HGB 11.7 (L) 08/09/2023    HGB 11.4 (L) 04/05/2023    HGB 11.9 (L) 01/05/2023    HCT 37.6 " "08/09/2023    HCT 35.8 (L) 04/05/2023    HCT 37.4 01/05/2023    MCV 99 08/09/2023    MCV 98 04/05/2023    MCV 97 01/05/2023    MCHC 31.1 (L) 08/09/2023    MCHC 31.8 (L) 04/05/2023    MCHC 31.8 (L) 01/05/2023    RDW 14.2 08/09/2023    RDW 14.1 04/05/2023    RDW 14.5 01/05/2023     08/09/2023     04/05/2023     01/05/2023       Cardiac Enzymes:    No results found for: \"TROPHS\"    Hepatic Function Panel:    Lab Results   Component Value Date    ALKPHOS 67 10/07/2022    ALT 11 10/07/2022    AST 15 10/07/2022    PROT 7.1 10/07/2022    BILITOT 0.3 10/07/2022       Diagnostic Studies:     Electrocardiogram 12 Lead    Result Date: 2/15/2023  Normal sinus rhythm Normal ECG No previous ECGs available Confirmed by DAVID PRADO MD ([id number]) on 9/6/2021 3:38:01 PM    XR CHEST 1 VIEW    Result Date: 9/7/2021  Exam: XR CHEST PORTABLE History: Shortness breath Technique: AP portable view of the chest obtained. Comparison: None available Findings: Left-sided central port is present and terminates in the SVC. Atherosclerotic calcification of the thoracic aorta. The cardiomediastinal silhouette is within normal limits.  No pneumothorax, pleural effusion, or consolidation.  Bibasilar atelectasis and/or scarring. No acute osseous abnormality.    No radiographic evidence of acute intrathoracic process.    CT CHEST WO IV CONTRAST    Result Date: 9/6/2021  MRN: [id number] Patient Name: DARVIN SMITH  STUDY: CT CHEST WO CONTRAST;  9/6/2021 2:37 pm  INDICATION: chest pain.  COMPARISON: 05/30/2018  ACCESSION NUMBER(S): [id number]  ORDERING CLINICIAN: JOVANNI MCALLISTER  TECHNIQUE: Serial axial CT images of the chest obtained  without intravenous contrast. Sagittal and coronal reconstructions were generated  FINDINGS: VESSELS: There are atherosclerotic changes of the aorta. A port is present in the cava. The main pulmonary artery is unremarkable.  HEART:  The heart is normal in size. There is a small pericardial effusion.  " MEDIASTINUM AND KEVIN: There is no significant mediastinal or hilar lymphadenopathy.  LUNG, PLEURA, AND LARGE AIRWAYS: There are emphysematous changes.  There is irregular pleural thickening anteriorly on the right. This may be due to previous radiation for the patient's right breast cancer.  There are bilateral patchy and nodular infiltrates. There is no obvious pleural fluid. There is subpleural honeycombing.  CHEST WALL AND LOWER NECK: The thyroid as visualized is not enlarged.  BONES: There is no gross bony abnormality.  UPPER ABDOMEN: There is a hiatal hernia.  COMPARISON TO THE PRIOR EXAM: The patchy nodular areas in the lungs were not present previously. There has been progression of the subpleural honeycombing.  IMPRESSION: Emphysematous disease. Subpleural honeycombing.  Patchy nodular infiltrates. The differential includes infection.  Metastasis is a possibility in light of the patient's history of breast cancer.    XR CHEST 1 VIEW    Result Date: 9/6/2021  MRN: [id number] Patient Name: DARVIN SMITH  STUDY: CHEST 1 VIEW;  9/6/2021 1:35 pm  INDICATION: sob.  COMPARISON: 11/29/2018  ACCESSION NUMBER(S): [id number]  ORDERING CLINICIAN: JOVANNI MCALLISTER  FINDINGS: Left jugular implanted venous port catheter unchanged in position from the prior exam.    CARDIOMEDIASTINAL SILHOUETTE: Cardiomediastinal silhouette is normal in size and configuration.  LUNGS: No new focal lung opacity is seen.  ABDOMEN: No remarkable upper abdominal findings.  BONES: No acute osseous changes.  IMPRESSION: 1.  Stable chest without acute cardiopulmonary findings        Radiology:     No orders to display       Problem List:     Patient Active Problem List   Diagnosis    Drug therapy    Type 2 diabetes mellitus without complication, without long-term current use of insulin (CMS/[facility])    Vitamin D deficiency    Hyperlipidemia    Renal insufficiency    Hypertension    Anxiety and depression    Chronic obstructive pulmonary disease (CMS/[facility])     Current nicotine use    Encounter for care related to vascular access port       Asessment:   73-year-old female here for routine follow-up.    Meds, vitals, examination as noted.    Chart reviewed in detail and discussed with the patient at length.    Impression:  At least moderate aortic stenosis  Bilateral carotid disease with bruits  COPD  Coronary artery disease  Hypertension  Hyperlipidemia  Chronic renal insufficiency  Diabetes  Smoking abuse with COPD      Plan:   Recommendation:  Complete cessation of smoking  Maintain current meds  Plan carotid study echo and nuclear scan in the spring  Call if any issues or problems  Exercise and weight loss strongly advised.

## 2023-11-27 NOTE — PATIENT INSTRUCTIONS
Continue same medications/treatment.  Patient educated on proper medication use.  Patient educated on risk factor modification.  Please bring any lab results from other providers/physicians to your next appointment.    Please bring all medicines, vitamins, and herbal supplements with you when you come to the office.    Prescriptions will not be filled unless you are compliant with your follow up appointments or have a follow up appointment scheduled as per instruction of your physician. Refills should be requested at the time of your visit.    Follow up in 6 months  Carotid Ultrasound, ECHO and stress test to be done prior to your next office visit    I, BENJAMIN GUZMAN RN, AM SCRIBING FOR AND IN THE PRESENCE OF DR. UMU CLAROS, DO, FACC

## 2023-12-07 ENCOUNTER — LAB (OUTPATIENT)
Dept: LAB | Facility: LAB | Age: 73
End: 2023-12-07
Payer: MEDICARE

## 2023-12-07 DIAGNOSIS — N18.32 CHRONIC KIDNEY DISEASE, STAGE 3B (MULTI): Primary | ICD-10-CM

## 2023-12-07 DIAGNOSIS — I12.9 HYPERTENSIVE CHRONIC KIDNEY DISEASE WITH STAGE 1 THROUGH STAGE 4 CHRONIC KIDNEY DISEASE, OR UNSPECIFIED CHRONIC KIDNEY DISEASE: ICD-10-CM

## 2023-12-07 DIAGNOSIS — E11.22 TYPE 2 DIABETES MELLITUS WITH DIABETIC CHRONIC KIDNEY DISEASE (MULTI): ICD-10-CM

## 2023-12-07 LAB
ANION GAP SERPL CALC-SCNC: 12 MMOL/L (ref 10–20)
BASOPHILS # BLD AUTO: 0.04 X10*3/UL (ref 0–0.1)
BASOPHILS NFR BLD AUTO: 0.5 %
BUN SERPL-MCNC: 32 MG/DL (ref 6–23)
CALCIUM SERPL-MCNC: 9.7 MG/DL (ref 8.6–10.3)
CHLORIDE SERPL-SCNC: 104 MMOL/L (ref 98–107)
CO2 SERPL-SCNC: 24 MMOL/L (ref 21–32)
CREAT SERPL-MCNC: 1.86 MG/DL (ref 0.5–1.05)
EOSINOPHIL # BLD AUTO: 0.25 X10*3/UL (ref 0–0.4)
EOSINOPHIL NFR BLD AUTO: 2.9 %
ERYTHROCYTE [DISTWIDTH] IN BLOOD BY AUTOMATED COUNT: 14 % (ref 11.5–14.5)
GFR SERPL CREATININE-BSD FRML MDRD: 28 ML/MIN/1.73M*2
GLUCOSE SERPL-MCNC: 217 MG/DL (ref 74–99)
HCT VFR BLD AUTO: 39.2 % (ref 36–46)
HGB BLD-MCNC: 12.2 G/DL (ref 12–16)
IMM GRANULOCYTES # BLD AUTO: 0.04 X10*3/UL (ref 0–0.5)
IMM GRANULOCYTES NFR BLD AUTO: 0.5 % (ref 0–0.9)
LYMPHOCYTES # BLD AUTO: 3.65 X10*3/UL (ref 0.8–3)
LYMPHOCYTES NFR BLD AUTO: 42.7 %
MCH RBC QN AUTO: 30.3 PG (ref 26–34)
MCHC RBC AUTO-ENTMCNC: 31.1 G/DL (ref 32–36)
MCV RBC AUTO: 98 FL (ref 80–100)
MONOCYTES # BLD AUTO: 0.82 X10*3/UL (ref 0.05–0.8)
MONOCYTES NFR BLD AUTO: 9.6 %
NEUTROPHILS # BLD AUTO: 3.75 X10*3/UL (ref 1.6–5.5)
NEUTROPHILS NFR BLD AUTO: 43.8 %
NRBC BLD-RTO: 0 /100 WBCS (ref 0–0)
PHOSPHATE SERPL-MCNC: 3.2 MG/DL (ref 2.5–4.9)
PLATELET # BLD AUTO: 229 X10*3/UL (ref 150–450)
POTASSIUM SERPL-SCNC: 4.7 MMOL/L (ref 3.5–5.3)
PTH-INTACT SERPL-MCNC: 11.7 PG/ML (ref 18.5–88)
RBC # BLD AUTO: 4.02 X10*6/UL (ref 4–5.2)
SODIUM SERPL-SCNC: 135 MMOL/L (ref 136–145)
WBC # BLD AUTO: 8.6 X10*3/UL (ref 4.4–11.3)

## 2023-12-07 PROCEDURE — 83970 ASSAY OF PARATHORMONE: CPT

## 2023-12-07 PROCEDURE — 84100 ASSAY OF PHOSPHORUS: CPT

## 2023-12-07 PROCEDURE — 85025 COMPLETE CBC W/AUTO DIFF WBC: CPT

## 2023-12-07 PROCEDURE — 80048 BASIC METABOLIC PNL TOTAL CA: CPT

## 2024-01-04 ENCOUNTER — APPOINTMENT (OUTPATIENT)
Dept: PRIMARY CARE | Facility: CLINIC | Age: 74
End: 2024-01-04
Payer: MEDICARE

## 2024-01-09 RX ORDER — HEPARIN 100 UNIT/ML
500 SYRINGE INTRAVENOUS AS NEEDED
OUTPATIENT
Start: 2024-01-17

## 2024-01-11 PROBLEM — I10 BENIGN ESSENTIAL HYPERTENSION: Status: ACTIVE | Noted: 2024-01-11

## 2024-01-11 PROBLEM — R55 SYNCOPE: Status: ACTIVE | Noted: 2024-01-11

## 2024-01-11 PROBLEM — I34.0 MITRAL VALVE INSUFFICIENCY: Status: ACTIVE | Noted: 2024-01-11

## 2024-01-11 PROBLEM — I65.29 CAROTID ARTERY STENOSIS: Status: ACTIVE | Noted: 2024-01-11

## 2024-01-11 PROBLEM — R06.09 CHRONIC DYSPNEA: Status: ACTIVE | Noted: 2024-01-11

## 2024-01-11 PROBLEM — R09.89 BILATERAL CAROTID BRUITS: Status: ACTIVE | Noted: 2024-01-11

## 2024-01-11 PROBLEM — F17.200 CURRENT EVERY DAY SMOKER: Status: ACTIVE | Noted: 2024-01-11

## 2024-01-11 PROBLEM — R06.09 DYSPNEA ON EXERTION: Status: ACTIVE | Noted: 2024-01-11

## 2024-01-11 PROBLEM — I35.0 AORTIC STENOSIS, MODERATE: Status: ACTIVE | Noted: 2024-01-11

## 2024-01-11 PROBLEM — R01.1 MURMUR, CARDIAC: Status: ACTIVE | Noted: 2024-01-11

## 2024-01-17 ENCOUNTER — OFFICE VISIT (OUTPATIENT)
Dept: PRIMARY CARE | Facility: CLINIC | Age: 74
End: 2024-01-17
Payer: MEDICARE

## 2024-01-17 ENCOUNTER — INFUSION (OUTPATIENT)
Dept: HEMATOLOGY/ONCOLOGY | Facility: CLINIC | Age: 74
End: 2024-01-17
Payer: MEDICARE

## 2024-01-17 ENCOUNTER — LAB REQUISITION (OUTPATIENT)
Dept: LAB | Facility: HOSPITAL | Age: 74
End: 2024-01-17
Payer: MEDICARE

## 2024-01-17 VITALS
RESPIRATION RATE: 20 BRPM | OXYGEN SATURATION: 98 % | DIASTOLIC BLOOD PRESSURE: 65 MMHG | SYSTOLIC BLOOD PRESSURE: 147 MMHG | TEMPERATURE: 99 F | HEART RATE: 68 BPM

## 2024-01-17 VITALS
HEART RATE: 70 BPM | DIASTOLIC BLOOD PRESSURE: 69 MMHG | HEIGHT: 62 IN | SYSTOLIC BLOOD PRESSURE: 146 MMHG | BODY MASS INDEX: 27.23 KG/M2 | WEIGHT: 148 LBS | OXYGEN SATURATION: 95 %

## 2024-01-17 DIAGNOSIS — C50.111 MALIGNANT NEOPLASM OF CENTRAL PORTION OF RIGHT FEMALE BREAST, UNSPECIFIED ESTROGEN RECEPTOR STATUS (MULTI): ICD-10-CM

## 2024-01-17 DIAGNOSIS — Z13.9 SCREENING FOR CONDITION: ICD-10-CM

## 2024-01-17 DIAGNOSIS — J44.9 CHRONIC OBSTRUCTIVE PULMONARY DISEASE, UNSPECIFIED COPD TYPE (MULTI): ICD-10-CM

## 2024-01-17 DIAGNOSIS — Z79.899 DRUG THERAPY: ICD-10-CM

## 2024-01-17 DIAGNOSIS — Z00.00 PREVENTATIVE HEALTH CARE: ICD-10-CM

## 2024-01-17 DIAGNOSIS — Z00.00 ROUTINE GENERAL MEDICAL EXAMINATION AT HEALTH CARE FACILITY: Primary | ICD-10-CM

## 2024-01-17 DIAGNOSIS — I12.9 HYPERTENSIVE CHRONIC KIDNEY DISEASE WITH STAGE 1 THROUGH STAGE 4 CHRONIC KIDNEY DISEASE, OR UNSPECIFIED CHRONIC KIDNEY DISEASE: ICD-10-CM

## 2024-01-17 DIAGNOSIS — Z45.2 ENCOUNTER FOR CARE RELATED TO VASCULAR ACCESS PORT: ICD-10-CM

## 2024-01-17 DIAGNOSIS — N18.30 STAGE 3 CHRONIC KIDNEY DISEASE, UNSPECIFIED WHETHER STAGE 3A OR 3B CKD (MULTI): ICD-10-CM

## 2024-01-17 DIAGNOSIS — N18.4 TYPE 2 DIABETES MELLITUS WITH STAGE 4 CHRONIC KIDNEY DISEASE, WITHOUT LONG-TERM CURRENT USE OF INSULIN (MULTI): ICD-10-CM

## 2024-01-17 DIAGNOSIS — N18.4 CHRONIC KIDNEY DISEASE, STAGE 4 (SEVERE) (MULTI): ICD-10-CM

## 2024-01-17 DIAGNOSIS — I70.0 ATHEROSCLEROSIS OF AORTA (CMS-HCC): ICD-10-CM

## 2024-01-17 DIAGNOSIS — E55.9 VITAMIN D DEFICIENCY: ICD-10-CM

## 2024-01-17 DIAGNOSIS — E78.5 HYPERLIPIDEMIA, UNSPECIFIED HYPERLIPIDEMIA TYPE: ICD-10-CM

## 2024-01-17 DIAGNOSIS — E11.22 TYPE 2 DIABETES MELLITUS WITH STAGE 4 CHRONIC KIDNEY DISEASE, WITHOUT LONG-TERM CURRENT USE OF INSULIN (MULTI): ICD-10-CM

## 2024-01-17 LAB
ANION GAP SERPL CALC-SCNC: 13 MMOL/L (ref 10–20)
BUN SERPL-MCNC: 26 MG/DL (ref 6–23)
CALCIUM SERPL-MCNC: 8.7 MG/DL (ref 8.6–10.3)
CHLORIDE SERPL-SCNC: 100 MMOL/L (ref 98–107)
CO2 SERPL-SCNC: 24 MMOL/L (ref 21–32)
CREAT SERPL-MCNC: 1.6 MG/DL (ref 0.5–1.05)
EGFRCR SERPLBLD CKD-EPI 2021: 33 ML/MIN/1.73M*2
GLUCOSE SERPL-MCNC: 220 MG/DL (ref 74–99)
POTASSIUM SERPL-SCNC: 4.3 MMOL/L (ref 3.5–5.3)
SODIUM SERPL-SCNC: 133 MMOL/L (ref 136–145)

## 2024-01-17 PROCEDURE — 3078F DIAST BP <80 MM HG: CPT | Performed by: FAMILY MEDICINE

## 2024-01-17 PROCEDURE — 80048 BASIC METABOLIC PNL TOTAL CA: CPT

## 2024-01-17 PROCEDURE — 1160F RVW MEDS BY RX/DR IN RCRD: CPT | Performed by: FAMILY MEDICINE

## 2024-01-17 PROCEDURE — 2500000004 HC RX 250 GENERAL PHARMACY W/ HCPCS (ALT 636 FOR OP/ED): Performed by: SURGERY

## 2024-01-17 PROCEDURE — 1170F FXNL STATUS ASSESSED: CPT | Performed by: FAMILY MEDICINE

## 2024-01-17 PROCEDURE — 3077F SYST BP >= 140 MM HG: CPT | Performed by: FAMILY MEDICINE

## 2024-01-17 PROCEDURE — 1126F AMNT PAIN NOTED NONE PRSNT: CPT | Performed by: FAMILY MEDICINE

## 2024-01-17 PROCEDURE — 4010F ACE/ARB THERAPY RXD/TAKEN: CPT | Performed by: FAMILY MEDICINE

## 2024-01-17 PROCEDURE — 99214 OFFICE O/P EST MOD 30 MIN: CPT | Performed by: FAMILY MEDICINE

## 2024-01-17 PROCEDURE — 99397 PER PM REEVAL EST PAT 65+ YR: CPT | Performed by: FAMILY MEDICINE

## 2024-01-17 PROCEDURE — 36591 DRAW BLOOD OFF VENOUS DEVICE: CPT

## 2024-01-17 PROCEDURE — G0439 PPPS, SUBSEQ VISIT: HCPCS | Performed by: FAMILY MEDICINE

## 2024-01-17 PROCEDURE — 96523 IRRIG DRUG DELIVERY DEVICE: CPT

## 2024-01-17 PROCEDURE — 1159F MED LIST DOCD IN RCRD: CPT | Performed by: FAMILY MEDICINE

## 2024-01-17 PROCEDURE — 3066F NEPHROPATHY DOC TX: CPT | Performed by: FAMILY MEDICINE

## 2024-01-17 RX ORDER — PIOGLITAZONEHYDROCHLORIDE 15 MG/1
15 TABLET ORAL DAILY
Qty: 90 TABLET | Refills: 1 | Status: SHIPPED | OUTPATIENT
Start: 2024-01-17 | End: 2025-01-16

## 2024-01-17 RX ORDER — HEPARIN 100 UNIT/ML
5 SYRINGE INTRAVENOUS ONCE
Status: COMPLETED | OUTPATIENT
Start: 2024-01-17 | End: 2024-01-17

## 2024-01-17 RX ORDER — HEPARIN 100 UNIT/ML
5 SYRINGE INTRAVENOUS ONCE
Status: DISCONTINUED | OUTPATIENT
Start: 2024-01-17 | End: 2024-01-17 | Stop reason: ENTERED-IN-ERROR

## 2024-01-17 RX ADMIN — HEPARIN 500 UNITS: 100 SYRINGE at 09:30

## 2024-01-17 ASSESSMENT — ACTIVITIES OF DAILY LIVING (ADL)
DOING_HOUSEWORK: INDEPENDENT
MANAGING_FINANCES: INDEPENDENT
GROCERY_SHOPPING: INDEPENDENT
TAKING_MEDICATION: INDEPENDENT
BATHING: INDEPENDENT
DRESSING: INDEPENDENT

## 2024-01-17 ASSESSMENT — ENCOUNTER SYMPTOMS
LOSS OF SENSATION IN FEET: 0
DEPRESSION: 0
OCCASIONAL FEELINGS OF UNSTEADINESS: 0

## 2024-01-17 ASSESSMENT — PAIN SCALES - GENERAL: PAINLEVEL: 0-NO PAIN

## 2024-01-17 NOTE — PATIENT INSTRUCTIONS
Patient is here for  and annual preventative visit.    Patient Discussion/Summary     annual preventative with [place].      ----  - Negative screen for hepatitis C August 2017.  - Screening for colon cancer, patient had colonoscopy around 2019, told to come back in 10 years for next, so that will be around 2029.  - Breast cancer history, seeing Humberto Tee and Aleisha. -->> f/u as planned.     Need for immunizations. Up-to-date on coronavirus series including latest boosters. Also up-to-date on annual flu shot. Up-to-date on both pneumonia shots as well as the new RSV immunization.  is also due for tetanus shot as well as the new shingles series. -->>  Check with your pharmacy to stay up-to-date on your immunizations.  ---------------------     Overweight, BMI 27, maybe 3 pounds since last visit here.  Weight is pretty stable over the last several years. Patient does note she is eating a little better.-->> Continue to limit simple carbohydrates like bread, pasta, potatoes, rice, sugars in general.     Smoking maybe 1 to 2 cigarettes a day.  Much improved from half pack a day, down from a pack or more daily -->> keep up the excellent work!  Of course I do advise you to completely quit.     Cardiac CT calcium scoring result was a zero, excellent. Pt does have h/o carotid artery stenosis, coronary artery disease/atherosclerosis, holosystolic murmur. -->> Follow-up with cardiology/Dr. Parson.       Regarding previous labs:     - Type 2 diabetes, A1c is 7.3, was 6.5, 7.2, 7.7, 7.0, 6.8, 6.4, 6.6, 6.7, 6.4, 5.8, 5.3, 6.1. We will just recheck in about 3 months. On glipizide 5mg 3 times per day, but in general has pretty much been taken 3 times daily, usually only takes it when sugar is above 150. Has occasional problems with sugar going too low. Patient's daughter did have a type of thyroid cancer, not sure what type. Never started Januvia, it was not covered.  Unable to take acarbose due to kidney function.->>   Continue glipizide.  Will add low-dose Actos to try to lower sugars a little more.    - Vitamin D deficiency, 63, was 38, 37, 57, 54, 60. Goal is . On daily vitamin D, our best guess is she is taking 2000 units, that is 1000 units 2 pills daily. -->> We will recheck with annual labs in about 6 months.    - Hyperlipidemia, HDL 39, was 35, 34, 40, 38, 38, 40, goal is over 45. LDL is 27, was 43, 36, 185, 142, 152, 148, 63, goal is less than 70, so your cholesterol numbers are excellent. On rosuvastatin/Crestor 20 mg daily along with coenzyme Q 10 over-the-counter. -->> Continue current treatment. Will recheck with next annual labs.    - Renal insufficiency/CKD estimated GFR 26, was 32, 37,  34, 39, 35, 35, 34, 36, . Seeing Dr. Garcia. -->> f/u with nephrology as planned. Looks like patient is getting labs with nephrology every 4 months. We will try to synchronize our labs around April with nephrology labs.     Hypertension, blood pressure borderline today. In general numbers pretty good on chart. -->> Follow-up with cardiology as planned.      COPD, history of AECOPD end of 2021, but not since in general doing well, not needing the albuterol since early 2022.      Anxiety and depression. Patient does well without meds, with the assistance of her cat. The cat helps her perform activities of daily living without getting as stressed. Patient is definitely calmer and less stressed with cat around. -->> [facility] has a support [facility]. Or could write a letter if needed.     GERD. Doing well since she restarted the pantoprazole. -->> continue pantoprazole 20 mg daily.        - Will recheck blood pressure to get a good resting number on the chart before patient leaves.  -Patient will return in about 3 months for annual lab review and follow-up.  -lab are ordered for patient to get our blood work done with nephrology labs early April, and hopefully at least a week before  next appointment.

## 2024-01-17 NOTE — PROGRESS NOTES
"Subjective   Reason for Visit: Alma Lane is an 73 y.o. female here for a Medicare Wellness visit.          Reviewed all medications by prescribing practitioner or clinical pharmacist (such as prescriptions, OTCs, herbal therapies and supplements) and documented in the medical record.    HPI    Patient Care Team:  Yogesh Garrett DO as PCP - General  Yogesh Garrett DO as PCP - [employer] PCP     Review of Systems  Denies N/V/D/C, no HA/S/V, denies rashes/lesions, no CP/SOB. Denies fevers/chills.  All other systems were negative.    Objective   Vitals:  /69 (BP Location: Right arm, Patient Position: Sitting)   Pulse 70   Ht 1.575 m (5' 2\")   Wt 67.1 kg (148 lb)   SpO2 95%   BMI 27.07 kg/m²       Physical Exam  Gen: NAD  eyes: EOMI, PERRLA  ENT: hearing grossly intact, no nasal discharge  resp: CTABL, without R/R  heart: RRR without MRG  GI: abd: S/ND/NT, BS+  lymph: no axillary, cervical, supraclavicular lymphadenopathy noted   MS: gait grossly WNL,  derm: no rashes or lesions noted  neuro: CN II-XII grossly intact  psych: A&Ox3    Assessment/Plan   Problem List Items Addressed This Visit       Drug therapy    Relevant Orders    CBC and Auto Differential    Comprehensive Metabolic Panel    Magnesium    Urinalysis with Reflex Microscopic    Vitamin D deficiency    Relevant Orders    Vitamin D 25-Hydroxy,Total (for eval of Vitamin D levels)    Hyperlipidemia    Relevant Orders    Lipid Panel    Chronic obstructive pulmonary disease (CMS/HCC)    Atherosclerosis of aorta (CMS/HCC)    Malignant neoplasm of central portion of right female breast, unspecified estrogen receptor status (CMS/HCC)    Type 2 diabetes mellitus with stage 4 chronic kidney disease, without long-term current use of insulin (CMS/HCC)    Relevant Orders    Hemoglobin A1C    Stage 3 chronic kidney disease, unspecified whether stage 3a or 3b CKD (CMS/HCC)     Other Visit Diagnoses       Routine general medical " examination at health care facility    -  Primary    Screening for condition        Relevant Orders    TSH with reflex to Free T4 if abnormal    Lipid Panel    Hemoglobin A1C    Preventative health care        Relevant Orders    CBC and Auto Differential    Comprehensive Metabolic Panel    TSH with reflex to Free T4 if abnormal    Magnesium    Lipid Panel    Hemoglobin A1C    Urinalysis with Reflex Microscopic    Vitamin D 25-Hydroxy,Total (for eval of Vitamin D levels)

## 2024-02-14 ENCOUNTER — APPOINTMENT (OUTPATIENT)
Dept: SURGERY | Facility: HOSPITAL | Age: 74
End: 2024-02-14
Payer: MEDICARE

## 2024-02-14 ENCOUNTER — HOSPITAL ENCOUNTER (OUTPATIENT)
Dept: RADIOLOGY | Facility: HOSPITAL | Age: 74
Discharge: HOME | End: 2024-02-14
Payer: MEDICARE

## 2024-02-14 DIAGNOSIS — Z17.1 ESTROGEN RECEPTOR NEGATIVE STATUS (ER-): ICD-10-CM

## 2024-02-14 DIAGNOSIS — C50.911 MALIGNANT NEOPLASM OF UNSPECIFIED SITE OF RIGHT FEMALE BREAST (MULTI): ICD-10-CM

## 2024-02-14 PROCEDURE — 77063 BREAST TOMOSYNTHESIS BI: CPT | Mod: BILATERAL PROCEDURE | Performed by: RADIOLOGY

## 2024-02-14 PROCEDURE — 77067 SCR MAMMO BI INCL CAD: CPT | Mod: BILATERAL PROCEDURE | Performed by: RADIOLOGY

## 2024-02-14 PROCEDURE — 77067 SCR MAMMO BI INCL CAD: CPT

## 2024-02-23 ENCOUNTER — OFFICE VISIT (OUTPATIENT)
Dept: SURGERY | Facility: HOSPITAL | Age: 74
End: 2024-02-23
Payer: MEDICARE

## 2024-02-23 VITALS
BODY MASS INDEX: 27.79 KG/M2 | DIASTOLIC BLOOD PRESSURE: 70 MMHG | HEART RATE: 69 BPM | WEIGHT: 151 LBS | SYSTOLIC BLOOD PRESSURE: 128 MMHG | HEIGHT: 62 IN

## 2024-02-23 DIAGNOSIS — Z17.1 MALIGNANT NEOPLASM OF RIGHT BREAST IN FEMALE, ESTROGEN RECEPTOR NEGATIVE, UNSPECIFIED SITE OF BREAST (MULTI): Primary | ICD-10-CM

## 2024-02-23 DIAGNOSIS — C50.911 MALIGNANT NEOPLASM OF RIGHT BREAST IN FEMALE, ESTROGEN RECEPTOR NEGATIVE, UNSPECIFIED SITE OF BREAST (MULTI): Primary | ICD-10-CM

## 2024-02-23 PROCEDURE — 99213 OFFICE O/P EST LOW 20 MIN: CPT | Performed by: SURGERY

## 2024-02-23 PROCEDURE — 1157F ADVNC CARE PLAN IN RCRD: CPT | Performed by: SURGERY

## 2024-02-23 PROCEDURE — 3078F DIAST BP <80 MM HG: CPT | Performed by: SURGERY

## 2024-02-23 PROCEDURE — 1126F AMNT PAIN NOTED NONE PRSNT: CPT | Performed by: SURGERY

## 2024-02-23 PROCEDURE — 1159F MED LIST DOCD IN RCRD: CPT | Performed by: SURGERY

## 2024-02-23 PROCEDURE — 3074F SYST BP LT 130 MM HG: CPT | Performed by: SURGERY

## 2024-02-23 NOTE — PROGRESS NOTES
Subjective   Patient ID: Alma Lane is a 74 y.o. female who presents for No chief complaint on file..  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 adj XRT. Complete pathologic response.     Initial clinical staging T1N1  Final pathology T0N0, (0 out of 3 sentinel nodes positive) treatment effect noted     No complaints except occasional aching upper outer breast. No mass     Interval imagin2022: Bilateral screening mammogram normal category 2 findings  2023:Bilateral screening mammogram normal category 2 finding   24:Bilateral screening mammogram normal category 2 finding   Review of Systems    Past Medical History:   Diagnosis Date    Breast cancer (CMS/HCC)     Chronic obstructive pulmonary disease with (acute) exacerbation (CMS/HCC) 04/15/2022    Acute exacerbation of chronic obstructive pulmonary disease (COPD)    Hx antineoplastic chemo     Personal history of diseases of the blood and blood-forming organs and certain disorders involving the immune mechanism     History of anemia    Personal history of irradiation     Personal history of other diseases of the musculoskeletal system and connective tissue     History of arthritis    Personal history of other diseases of the respiratory system     History of chronic obstructive lung disease    Personal history of other diseases of the respiratory system     History of upper respiratory infection    Personal history of other diseases of the respiratory system     History of acute pharyngitis    Personal history of other diseases of the respiratory system 10/25/2016    History of acute pharyngitis    Personal history of other diseases of the respiratory system     History of pulmonary emphysema    Personal history of other endocrine, nutritional and metabolic disease     History of hyperlipidemia    Personal history of other endocrine, nutritional and  metabolic disease     History of diabetes mellitus    Personal history of other specified conditions     History of chest pain    Shortness of breath     Shortness of breath at rest     Past Surgical History:   Procedure Laterality Date    APPENDECTOMY  10/25/2016    Appendectomy    BREAST LUMPECTOMY      BREAST SURGERY  10/25/2016    Breast Surgery    COLONOSCOPY  10/25/2016    Complete Colonoscopy    CT ANGIO NECK  10/04/2021    CT NECK ANGIO W AND WO IV CONTRAST 10/4/2021 [facility]    MOUTH SURGERY  10/25/2016    Oral Surgery Tooth Extraction    OTHER SURGICAL HISTORY  01/21/2022    Ganglion cyst excision     No family history on file.   Social History     Socioeconomic History    Marital status:      Spouse name: Not on file    Number of children: Not on file    Years of education: Not on file    Highest education level: Not on file   Occupational History    Not on file   Tobacco Use    Smoking status: Every Day     Packs/day: .5     Types: Cigarettes    Smokeless tobacco: Never   Substance and Sexual Activity    Alcohol use: Not on file    Drug use: Not on file    Sexual activity: Not on file   Other Topics Concern    Not on file   Social History Narrative    Not on file     Social Determinants of Health     Financial Resource Strain: Not on file   Food Insecurity: Not on file   Transportation Needs: Not on file   Physical Activity: Not on file   Stress: Not on file   Social Connections: Not on file   Intimate Partner Violence: Not on file   Housing Stability: Not on file          Current Outpatient Medications:     aspirin 81 mg chewable tablet, Chew 1 tablet (81 mg) once daily., Disp: , Rfl:     biotin 5,000 mcg tablet,disintegrating, Take 1 tablet by mouth once daily., Disp: , Rfl:     cholecalciferol (Vitamin D-3) 125 MCG (5000 UT) capsule, Take 1 capsule (125 mcg) by mouth once daily., Disp: , Rfl:     cyanocobalamin (Vitamin B-12) 100 mcg tablet, Take 1 tablet (100 mcg) by mouth once  daily., Disp: , Rfl:     glipiZIDE (Glucotrol) 5 mg tablet, 1 by mouth up to 3 times daily as needed and directed., Disp: 270 tablet, Rfl: 3    losartan (Cozaar) 50 mg tablet, TAKE ONE TABLET BY MOUTH TWO TIMES A DAY, Disp: 180 tablet, Rfl: 0    metoprolol tartrate (Lopressor) 50 mg tablet, TAKE ONE TABLET BY MOUTH TWO TIMES A DAY, Disp: 180 tablet, Rfl: 0    pantoprazole (Protonix) 20 mg EC tablet, Take 1 tablet (20 mg) by mouth once daily in the morning. Take before meals. Do not crush, chew, or split., Disp: 90 tablet, Rfl: 3    pioglitazone (Actos) 15 mg tablet, Take 1 tablet (15 mg) by mouth once daily., Disp: 90 tablet, Rfl: 1     Objective   Vitals:    02/23/24 1312   BP: 128/70   Pulse: 69      Physical Exam  Constitutional: Alert, no acute distress  HEENT: Well-developed, anicteric  Neck: Supple without adenopathy  Chest: Unlabored respirations  Breast: No dominant mass, skin change, nipple discharge. Postoperative changes  Heart: Regular  Abdomen: Soft, nondistended  Extremities: Warm and well perfused, no edema  Psychiatric: Oriented appropriately, mood and affect appropriate        Assessment/Plan   Problem List Items Addressed This Visit    None  Visit Diagnoses         Codes    Malignant neoplasm of right breast in female, estrogen receptor negative, unspecified site of breast (CMS/HCC)    -  Primary C50.911, Z17.1          Overall doing well from a standpoint.  1 year follow-up with bilateral screening mammogram.    Mirna Tee MD

## 2024-03-13 ENCOUNTER — INFUSION (OUTPATIENT)
Dept: HEMATOLOGY/ONCOLOGY | Facility: CLINIC | Age: 74
End: 2024-03-13
Payer: MEDICARE

## 2024-03-13 VITALS
OXYGEN SATURATION: 98 % | TEMPERATURE: 98.1 F | DIASTOLIC BLOOD PRESSURE: 71 MMHG | RESPIRATION RATE: 20 BRPM | HEART RATE: 61 BPM | SYSTOLIC BLOOD PRESSURE: 137 MMHG

## 2024-03-13 DIAGNOSIS — Z45.2 ENCOUNTER FOR CARE RELATED TO VASCULAR ACCESS PORT: ICD-10-CM

## 2024-03-13 PROCEDURE — 2500000004 HC RX 250 GENERAL PHARMACY W/ HCPCS (ALT 636 FOR OP/ED): Performed by: SURGERY

## 2024-03-13 PROCEDURE — 96523 IRRIG DRUG DELIVERY DEVICE: CPT

## 2024-03-13 RX ORDER — HEPARIN 100 UNIT/ML
5 SYRINGE INTRAVENOUS ONCE
Status: COMPLETED | OUTPATIENT
Start: 2024-03-13 | End: 2024-03-13

## 2024-03-13 RX ADMIN — HEPARIN 500 UNITS: 100 SYRINGE at 09:36

## 2024-04-10 ENCOUNTER — LAB (OUTPATIENT)
Dept: LAB | Facility: LAB | Age: 74
End: 2024-04-10
Payer: MEDICARE

## 2024-04-10 DIAGNOSIS — E55.9 VITAMIN D DEFICIENCY: ICD-10-CM

## 2024-04-10 DIAGNOSIS — N18.4 CHRONIC KIDNEY DISEASE, STAGE 4 (SEVERE) (MULTI): Primary | ICD-10-CM

## 2024-04-10 DIAGNOSIS — R79.9 ABNORMAL FINDING OF BLOOD CHEMISTRY, UNSPECIFIED: ICD-10-CM

## 2024-04-10 DIAGNOSIS — N18.4 TYPE 2 DIABETES MELLITUS WITH STAGE 4 CHRONIC KIDNEY DISEASE, WITHOUT LONG-TERM CURRENT USE OF INSULIN (MULTI): ICD-10-CM

## 2024-04-10 DIAGNOSIS — Z13.9 SCREENING FOR CONDITION: ICD-10-CM

## 2024-04-10 DIAGNOSIS — E11.22 TYPE 2 DIABETES MELLITUS WITH DIABETIC CHRONIC KIDNEY DISEASE (MULTI): ICD-10-CM

## 2024-04-10 DIAGNOSIS — I12.9 HYPERTENSIVE CHRONIC KIDNEY DISEASE WITH STAGE 1 THROUGH STAGE 4 CHRONIC KIDNEY DISEASE, OR UNSPECIFIED CHRONIC KIDNEY DISEASE: ICD-10-CM

## 2024-04-10 DIAGNOSIS — E78.5 HYPERLIPIDEMIA, UNSPECIFIED HYPERLIPIDEMIA TYPE: ICD-10-CM

## 2024-04-10 DIAGNOSIS — Z00.00 PREVENTATIVE HEALTH CARE: ICD-10-CM

## 2024-04-10 DIAGNOSIS — E11.22 TYPE 2 DIABETES MELLITUS WITH STAGE 4 CHRONIC KIDNEY DISEASE, WITHOUT LONG-TERM CURRENT USE OF INSULIN (MULTI): ICD-10-CM

## 2024-04-10 DIAGNOSIS — Z79.899 DRUG THERAPY: ICD-10-CM

## 2024-04-10 LAB
25(OH)D3 SERPL-MCNC: 68 NG/ML (ref 30–100)
ALBUMIN SERPL BCP-MCNC: 3.8 G/DL (ref 3.4–5)
ALP SERPL-CCNC: 51 U/L (ref 33–136)
ALT SERPL W P-5'-P-CCNC: 11 U/L (ref 7–45)
ANION GAP SERPL CALC-SCNC: 10 MMOL/L (ref 10–20)
APPEARANCE UR: CLEAR
AST SERPL W P-5'-P-CCNC: 14 U/L (ref 9–39)
BACTERIA #/AREA URNS AUTO: ABNORMAL /HPF
BASOPHILS # BLD AUTO: 0.04 X10*3/UL (ref 0–0.1)
BASOPHILS NFR BLD AUTO: 0.5 %
BILIRUB SERPL-MCNC: 0.3 MG/DL (ref 0–1.2)
BILIRUB UR STRIP.AUTO-MCNC: NEGATIVE MG/DL
BUN SERPL-MCNC: 25 MG/DL (ref 6–23)
CALCIUM SERPL-MCNC: 8.9 MG/DL (ref 8.6–10.3)
CHLORIDE SERPL-SCNC: 108 MMOL/L (ref 98–107)
CHOLEST SERPL-MCNC: 107 MG/DL (ref 0–199)
CHOLESTEROL/HDL RATIO: 3.9
CO2 SERPL-SCNC: 25 MMOL/L (ref 21–32)
COLOR UR: ABNORMAL
CREAT SERPL-MCNC: 1.36 MG/DL (ref 0.5–1.05)
EGFRCR SERPLBLD CKD-EPI 2021: 40 ML/MIN/1.73M*2
EOSINOPHIL # BLD AUTO: 0.2 X10*3/UL (ref 0–0.4)
EOSINOPHIL NFR BLD AUTO: 2.5 %
ERYTHROCYTE [DISTWIDTH] IN BLOOD BY AUTOMATED COUNT: 14.1 % (ref 11.5–14.5)
EST. AVERAGE GLUCOSE BLD GHB EST-MCNC: 154 MG/DL
GLUCOSE SERPL-MCNC: 186 MG/DL (ref 74–99)
GLUCOSE UR STRIP.AUTO-MCNC: ABNORMAL MG/DL
HBA1C MFR BLD: 7 %
HCT VFR BLD AUTO: 35.6 % (ref 36–46)
HDLC SERPL-MCNC: 27.6 MG/DL
HGB BLD-MCNC: 11 G/DL (ref 12–16)
IMM GRANULOCYTES # BLD AUTO: 0.02 X10*3/UL (ref 0–0.5)
IMM GRANULOCYTES NFR BLD AUTO: 0.2 % (ref 0–0.9)
KETONES UR STRIP.AUTO-MCNC: NEGATIVE MG/DL
LDLC SERPL CALC-MCNC: 18 MG/DL
LEUKOCYTE ESTERASE UR QL STRIP.AUTO: ABNORMAL
LYMPHOCYTES # BLD AUTO: 4.15 X10*3/UL (ref 0.8–3)
LYMPHOCYTES NFR BLD AUTO: 51.6 %
MAGNESIUM SERPL-MCNC: 2.17 MG/DL (ref 1.6–2.4)
MCH RBC QN AUTO: 30.6 PG (ref 26–34)
MCHC RBC AUTO-ENTMCNC: 30.9 G/DL (ref 32–36)
MCV RBC AUTO: 99 FL (ref 80–100)
MONOCYTES # BLD AUTO: 0.54 X10*3/UL (ref 0.05–0.8)
MONOCYTES NFR BLD AUTO: 6.7 %
MUCOUS THREADS #/AREA URNS AUTO: ABNORMAL /LPF
NEUTROPHILS # BLD AUTO: 3.09 X10*3/UL (ref 1.6–5.5)
NEUTROPHILS NFR BLD AUTO: 38.5 %
NITRITE UR QL STRIP.AUTO: NEGATIVE
NON HDL CHOLESTEROL: 79 MG/DL (ref 0–149)
NRBC BLD-RTO: 0 /100 WBCS (ref 0–0)
PH UR STRIP.AUTO: 5 [PH]
PHOSPHATE SERPL-MCNC: 3.1 MG/DL (ref 2.5–4.9)
PLATELET # BLD AUTO: 210 X10*3/UL (ref 150–450)
POTASSIUM SERPL-SCNC: 4.9 MMOL/L (ref 3.5–5.3)
PROT SERPL-MCNC: 6.6 G/DL (ref 6.4–8.2)
PROT UR STRIP.AUTO-MCNC: NEGATIVE MG/DL
PTH-INTACT SERPL-MCNC: 57.3 PG/ML (ref 18.5–88)
RBC # BLD AUTO: 3.6 X10*6/UL (ref 4–5.2)
RBC # UR STRIP.AUTO: NEGATIVE /UL
RBC #/AREA URNS AUTO: ABNORMAL /HPF
SODIUM SERPL-SCNC: 138 MMOL/L (ref 136–145)
SP GR UR STRIP.AUTO: 1
SQUAMOUS #/AREA URNS AUTO: ABNORMAL /HPF
TRIGL SERPL-MCNC: 308 MG/DL (ref 0–149)
TSH SERPL-ACNC: 1.7 MIU/L (ref 0.44–3.98)
UROBILINOGEN UR STRIP.AUTO-MCNC: <2 MG/DL
VLDL: 62 MG/DL (ref 0–40)
WBC # BLD AUTO: 8 X10*3/UL (ref 4.4–11.3)
WBC #/AREA URNS AUTO: ABNORMAL /HPF

## 2024-04-10 PROCEDURE — 81001 URINALYSIS AUTO W/SCOPE: CPT

## 2024-04-10 PROCEDURE — 83036 HEMOGLOBIN GLYCOSYLATED A1C: CPT

## 2024-04-10 PROCEDURE — 85025 COMPLETE CBC W/AUTO DIFF WBC: CPT

## 2024-04-10 PROCEDURE — 80061 LIPID PANEL: CPT

## 2024-04-10 PROCEDURE — 83970 ASSAY OF PARATHORMONE: CPT

## 2024-04-10 PROCEDURE — 84100 ASSAY OF PHOSPHORUS: CPT

## 2024-04-10 PROCEDURE — 83735 ASSAY OF MAGNESIUM: CPT

## 2024-04-10 PROCEDURE — 82306 VITAMIN D 25 HYDROXY: CPT

## 2024-04-10 PROCEDURE — 84443 ASSAY THYROID STIM HORMONE: CPT

## 2024-04-10 PROCEDURE — 80053 COMPREHEN METABOLIC PANEL: CPT

## 2024-04-18 ENCOUNTER — OFFICE VISIT (OUTPATIENT)
Dept: PRIMARY CARE | Facility: CLINIC | Age: 74
End: 2024-04-18
Payer: MEDICARE

## 2024-04-18 VITALS
HEIGHT: 62 IN | BODY MASS INDEX: 26.87 KG/M2 | OXYGEN SATURATION: 96 % | SYSTOLIC BLOOD PRESSURE: 138 MMHG | WEIGHT: 146 LBS | DIASTOLIC BLOOD PRESSURE: 57 MMHG | HEART RATE: 60 BPM

## 2024-04-18 DIAGNOSIS — N28.9 RENAL INSUFFICIENCY: ICD-10-CM

## 2024-04-18 DIAGNOSIS — J20.9 ACUTE BRONCHITIS, UNSPECIFIED ORGANISM: Primary | ICD-10-CM

## 2024-04-18 DIAGNOSIS — D64.9 ANEMIA, UNSPECIFIED TYPE: ICD-10-CM

## 2024-04-18 DIAGNOSIS — J44.9 CHRONIC OBSTRUCTIVE PULMONARY DISEASE, UNSPECIFIED COPD TYPE (MULTI): ICD-10-CM

## 2024-04-18 DIAGNOSIS — I10 PRIMARY HYPERTENSION: ICD-10-CM

## 2024-04-18 DIAGNOSIS — Z72.0 CURRENT NICOTINE USE: ICD-10-CM

## 2024-04-18 DIAGNOSIS — E78.2 MIXED HYPERLIPIDEMIA: ICD-10-CM

## 2024-04-18 DIAGNOSIS — E55.9 VITAMIN D DEFICIENCY: ICD-10-CM

## 2024-04-18 DIAGNOSIS — E11.9 TYPE 2 DIABETES MELLITUS WITHOUT COMPLICATION, WITHOUT LONG-TERM CURRENT USE OF INSULIN (MULTI): ICD-10-CM

## 2024-04-18 DIAGNOSIS — Z79.899 DRUG THERAPY: ICD-10-CM

## 2024-04-18 PROCEDURE — 3078F DIAST BP <80 MM HG: CPT | Performed by: FAMILY MEDICINE

## 2024-04-18 PROCEDURE — 3075F SYST BP GE 130 - 139MM HG: CPT | Performed by: FAMILY MEDICINE

## 2024-04-18 PROCEDURE — 1160F RVW MEDS BY RX/DR IN RCRD: CPT | Performed by: FAMILY MEDICINE

## 2024-04-18 PROCEDURE — 1157F ADVNC CARE PLAN IN RCRD: CPT | Performed by: FAMILY MEDICINE

## 2024-04-18 PROCEDURE — 99214 OFFICE O/P EST MOD 30 MIN: CPT | Performed by: FAMILY MEDICINE

## 2024-04-18 PROCEDURE — 1159F MED LIST DOCD IN RCRD: CPT | Performed by: FAMILY MEDICINE

## 2024-04-18 RX ORDER — AZITHROMYCIN 250 MG/1
TABLET, FILM COATED ORAL DAILY
Qty: 6 TABLET | Refills: 0 | Status: SHIPPED | OUTPATIENT
Start: 2024-04-18 | End: 2024-04-23

## 2024-04-18 NOTE — PROGRESS NOTES
Subjective   Patient ID: Alma Lane is a 74 y.o. female who presents for 3 Month FU (Pt in today for routine 3 month FU).    Review of Systems  Denies N/V/D/C, no HA/S/V, denies rashes/lesions, no CP/SOB. Denies fevers/chills.  Positive for productive cough for the last 3 to 4 weeks.  All other systems were negative.    Objective   Physical Exam  Gen: NAD  eyes: EOMI, PERRLA  ENT: hearing grossly intact, no nasal discharge  resp: CTABL, without R/R  heart: RRR without MRG  GI: abd: S/ND/NT, BS+  lymph: no axillary, cervical, supraclavicular lymphadenopathy noted   MS: gait grossly WNL,  derm: no rashes or lesions noted  neuro: CN II-XII grossly intact  psych: A&Ox3    Assessment/Plan   Diagnoses and all orders for this visit:  Acute bronchitis, unspecified organism  -     azithromycin (Zithromax) 250 mg tablet; Take 2 tablets (500 mg) by mouth once daily for 1 day, THEN 1 tablet (250 mg) once daily for 4 days. Take 2 tabs (500 mg) by mouth today, than 1 daily for 4 days..  Drug therapy  Type 2 diabetes mellitus without complication, without long-term current use of insulin (Multi)  -     Hemoglobin A1C; Future  Vitamin D deficiency  Primary hypertension  Renal insufficiency  Mixed hyperlipidemia  Chronic obstructive pulmonary disease, unspecified COPD type (Multi)  Current nicotine use  Anemia, unspecified type  -     Ferritin; Future  -     Iron and TIBC; Future  -     CBC; Future  -     Vitamin B12; Future  -     Folate; Future           Yogesh Garrett DO 04/18/24 12:15 PM         Patient Discussion/Summary     2025 will be next annual preventative with [place].      ----  - Negative screen for hepatitis C August 2017.  - Screening for colon cancer, patient had colonoscopy around 2019, told to come back in 10 years for next, so that will be around 2029.  - Breast cancer history, seeing Humberto Tee and Aleisha. -->> f/u as planned.     Need for immunizations. Up-to-date on coronavirus series including latest  boosters. Also up-to-date on annual flu shot. Up-to-date on both pneumonia shots as well as the new RSV immunization.  is also due for tetanus shot as well as the new shingles series. -->>  Check with your pharmacy to stay up-to-date on your immunizations.  ---------------------     Overweight, BMI 26, down 2 pounds since last visit here.  Weight is pretty stable over the last several years. Patient does note she is eating a little better.-->> Continue to limit simple carbohydrates like bread, pasta, potatoes, rice, sugars in general.     Smoking maybe 1 to 2 cigarettes a day.  Much improved from half pack a day, down from a pack or more daily -->> keep up the excellent work!  Of course I do advise you to completely quit.     Cardiac CT calcium scoring result was a zero, excellent. Pt does have h/o carotid artery stenosis, coronary artery disease/atherosclerosis, holosystolic murmur. -->> Follow-up with cardiology/Dr. Parson.       new annual labs reviewed:     Around August will check anemia panel, A1c.    - Renal insufficiency/CKD estimated GFR 40, was 26, 32, 37,  34, 39, 35, 35, 34, 36, . Seeing Dr. Garcia. -->> f/u with nephrology as planned. Looks like patient is getting labs with nephrology every 4 months. We will try to synchronize our labs around April with nephrology labs.     - Hyperlipidemia, Luis E 18, on rosuvastatin/Crestor 20 mg daily along with coenzyme Q 10 over-the-counter. -->> Continue current treatment. Will recheck with next annual labs.    - Type 2 diabetes, A1c is 7.0, was 7.3, 6.5, 7.2, 7.7, 7.0, 6.8, 6.4, 6.6, 6.7, 6.4, 5.8, 5.3, 6.1. We will just recheck in about 3 months. On glipizide 5mg 3 times per day, but in general has pretty much been taken 3 times daily, usually only takes it when sugar is above 150. Has occasional problems with sugar going too low. Patient's daughter did have a type of thyroid cancer, not sure what type. Never started Januvia, it was not covered.  Unable to take  acarbose due to kidney function.->>  Continue glipizide.  We prescribed Actos last time but patient has not yet started taking it.  She is going to check with nephrology to verify they think it is okay to take.    - Vitamin D deficiency, 68, was 63, 38, 37, 57, 54, 60. Goal is . On daily vitamin D, our best guess is she is taking 2000 units, that is 1000 units 2 pills daily. -->>  Increase your dose of vitamin D a little bit.  For example try taking a double dose every Monday and Friday.  And we will recheck in 12 months.    -Anemia, iron deficiency.  Last percent saturation on the chart was 20%.  Is hypochromic on this lab as well as mildly anemic. -->>  Gave patient handout on iron rich foods to try to get an extra 3-5 servings every week.  And will recheck with an anemia panel in 6 months.      Hypertension, blood pressure good today. In general numbers pretty good on chart. -->> Follow-up with cardiology as planned.      COPD, history of AECOPD end of 2021, but not since in general doing well, not needing the albuterol since early 2022.     Acute bronchitis, started 3 to 4 weeks ago, improving but still having a bit of a productive cough.  Narrow probably Z-Rambo just in case there is a bacterial component.  Continue your Robitussin/Mucinex/guaifenesin with plenty of water to help the phlegm cough up a little more easily.         Anxiety and depression. Patient does well without meds, with the assistance of her cat. The cat helps her perform activities of daily living without getting as stressed. Patient is definitely calmer and less stressed with cat around. -->> [facility] has a support [facility]. Or could write a letter if needed.     GERD. Doing well since she restarted the pantoprazole. -->> continue pantoprazole 20 mg daily.            -Patient will return in about 4 months for lab review and follow-up.  -lab are ordered for patient to get our blood work  done with nephrology labs early August.

## 2024-04-18 NOTE — PATIENT INSTRUCTIONS
Patient Discussion/Summary     2025 will be next annual preventative with [place].      ----  - Negative screen for hepatitis C August 2017.  - Screening for colon cancer, patient had colonoscopy around 2019, told to come back in 10 years for next, so that will be around 2029.  - Breast cancer history, seeing Humberto Tee and Aleisha. -->> f/u as planned.     Need for immunizations. Up-to-date on coronavirus series including latest boosters. Also up-to-date on annual flu shot. Up-to-date on both pneumonia shots as well as the new RSV immunization.  is also due for tetanus shot as well as the new shingles series. -->>  Check with your pharmacy to stay up-to-date on your immunizations.  ---------------------     Overweight, BMI 26, down 2 pounds since last visit here.  Weight is pretty stable over the last several years. Patient does note she is eating a little better.-->> Continue to limit simple carbohydrates like bread, pasta, potatoes, rice, sugars in general.     Smoking maybe 1 to 2 cigarettes a day.  Much improved from half pack a day, down from a pack or more daily -->> keep up the excellent work!  Of course I do advise you to completely quit.     Cardiac CT calcium scoring result was a zero, excellent. Pt does have h/o carotid artery stenosis, coronary artery disease/atherosclerosis, holosystolic murmur. -->> Follow-up with cardiology/Dr. Parson.       new annual labs reviewed:     Around August will check anemia panel, A1c.    - Renal insufficiency/CKD estimated GFR 40, was 26, 32, 37,  34, 39, 35, 35, 34, 36, . Seeing Dr. Garcia. -->> f/u with nephrology as planned. Looks like patient is getting labs with nephrology every 4 months. We will try to synchronize our labs around April with nephrology labs.     - Hyperlipidemia, [place] 18, on rosuvastatin/Crestor 20 mg daily along with coenzyme Q 10 over-the-counter. -->> Continue current treatment. Will recheck with next annual labs.    - Type 2 diabetes, A1c is  7.0, was 7.3, 6.5, 7.2, 7.7, 7.0, 6.8, 6.4, 6.6, 6.7, 6.4, 5.8, 5.3, 6.1. We will just recheck in about 3 months. On glipizide 5mg 3 times per day, but in general has pretty much been taken 3 times daily, usually only takes it when sugar is above 150. Has occasional problems with sugar going too low. Patient's daughter did have a type of thyroid cancer, not sure what type. Never started Januvia, it was not covered.  Unable to take acarbose due to kidney function.->>  Continue glipizide.  We prescribed Actos last time but patient has not yet started taking it.  She is going to check with nephrology to verify they think it is okay to take.    - Vitamin D deficiency, 68, was 63, 38, 37, 57, 54, 60. Goal is . On daily vitamin D, our best guess is she is taking 2000 units, that is 1000 units 2 pills daily. -->>  Increase your dose of vitamin D a little bit.  For example try taking a double dose every Monday and Friday.  And we will recheck in 12 months.    -Anemia, iron deficiency.  Last percent saturation on the chart was 20%.  Is hypochromic on this lab as well as mildly anemic. -->>  Gave patient handout on iron rich foods to try to get an extra 3-5 servings every week.  And will recheck with an anemia panel in 6 months.      Hypertension, blood pressure good today. In general numbers pretty good on chart. -->> Follow-up with cardiology as planned.      COPD, history of AECOPD end of 2021, but not since in general doing well, not needing the albuterol since early 2022.     Acute bronchitis, started 3 to 4 weeks ago, improving but still having a bit of a productive cough.  Narrow probably Z-Rambo just in case there is a bacterial component.  Continue your Robitussin/Mucinex/guaifenesin with plenty of water to help the phlegm cough up a little more easily.         Anxiety and depression. Patient does well without meds, with the assistance of her cat. The cat helps her perform activities of daily living without  getting as stressed. Patient is definitely calmer and less stressed with cat around. -->> we will sign papers as needed for landlord stating patient has a support animal. Or could write a letter if needed.     GERD. Doing well since she restarted the pantoprazole. -->> continue pantoprazole 20 mg daily.            -Patient will return in about 4 months for lab review and follow-up.  -lab are ordered for patient to get our blood work done with nephrology labs early August.

## 2024-05-08 ENCOUNTER — INFUSION (OUTPATIENT)
Dept: HEMATOLOGY/ONCOLOGY | Facility: CLINIC | Age: 74
End: 2024-05-08
Payer: MEDICARE

## 2024-05-08 VITALS
SYSTOLIC BLOOD PRESSURE: 158 MMHG | RESPIRATION RATE: 20 BRPM | HEIGHT: 62 IN | DIASTOLIC BLOOD PRESSURE: 66 MMHG | TEMPERATURE: 98.6 F | HEART RATE: 76 BPM | BODY MASS INDEX: 26.87 KG/M2 | OXYGEN SATURATION: 100 % | WEIGHT: 146 LBS

## 2024-05-08 DIAGNOSIS — Z45.2 ENCOUNTER FOR CARE RELATED TO VASCULAR ACCESS PORT: ICD-10-CM

## 2024-05-08 PROCEDURE — 2500000004 HC RX 250 GENERAL PHARMACY W/ HCPCS (ALT 636 FOR OP/ED)

## 2024-05-08 PROCEDURE — 96523 IRRIG DRUG DELIVERY DEVICE: CPT

## 2024-05-08 RX ORDER — HEPARIN 100 UNIT/ML
5 SYRINGE INTRAVENOUS ONCE
Status: COMPLETED | OUTPATIENT
Start: 2024-05-08 | End: 2024-05-08

## 2024-05-08 RX ADMIN — HEPARIN 500 UNITS: 100 SYRINGE at 09:23

## 2024-05-08 ASSESSMENT — PAIN SCALES - GENERAL: PAINLEVEL: 0-NO PAIN

## 2024-05-08 NOTE — PROGRESS NOTES
Port needle flushed with 20 ml NS and 5 ml heparin lock flush (100 units/ml).  Needle removed intact.  No bleeding noted.  Dressing applied. Pt discharged stable.

## 2024-05-10 DIAGNOSIS — I10 HYPERTENSION, UNSPECIFIED TYPE: ICD-10-CM

## 2024-05-11 RX ORDER — LOSARTAN POTASSIUM 50 MG/1
TABLET ORAL
Qty: 180 TABLET | Refills: 3 | Status: SHIPPED | OUTPATIENT
Start: 2024-05-11

## 2024-05-17 ENCOUNTER — HOSPITAL ENCOUNTER (OUTPATIENT)
Dept: RADIOLOGY | Facility: CLINIC | Age: 74
Discharge: HOME | End: 2024-05-17
Payer: MEDICARE

## 2024-05-17 ENCOUNTER — HOSPITAL ENCOUNTER (OUTPATIENT)
Dept: CARDIOLOGY | Facility: CLINIC | Age: 74
Discharge: HOME | End: 2024-05-17
Payer: MEDICARE

## 2024-05-17 DIAGNOSIS — R06.02 SHORTNESS OF BREATH: ICD-10-CM

## 2024-05-17 DIAGNOSIS — R06.09 DYSPNEA ON EXERTION: Primary | ICD-10-CM

## 2024-05-17 PROCEDURE — 3430000001 HC RX 343 DIAGNOSTIC RADIOPHARMACEUTICALS: Performed by: INTERNAL MEDICINE

## 2024-05-17 PROCEDURE — 93017 CV STRESS TEST TRACING ONLY: CPT

## 2024-05-17 PROCEDURE — A9502 TC99M TETROFOSMIN: HCPCS | Performed by: INTERNAL MEDICINE

## 2024-05-17 PROCEDURE — 2500000004 HC RX 250 GENERAL PHARMACY W/ HCPCS (ALT 636 FOR OP/ED): Performed by: INTERNAL MEDICINE

## 2024-05-17 PROCEDURE — 78452 HT MUSCLE IMAGE SPECT MULT: CPT

## 2024-05-17 RX ORDER — AMINOPHYLLINE 25 MG/ML
50 INJECTION, SOLUTION INTRAVENOUS ONCE
Status: COMPLETED | OUTPATIENT
Start: 2024-05-17 | End: 2024-05-17

## 2024-05-17 RX ORDER — ROSUVASTATIN CALCIUM 20 MG/1
20 TABLET, COATED ORAL DAILY
COMMUNITY

## 2024-05-17 RX ORDER — REGADENOSON 0.08 MG/ML
0.4 INJECTION, SOLUTION INTRAVENOUS ONCE
Status: COMPLETED | OUTPATIENT
Start: 2024-05-17 | End: 2024-05-17

## 2024-05-17 RX ADMIN — TETROFOSMIN 34.3 MILLICURIE: 0.23 INJECTION, POWDER, LYOPHILIZED, FOR SOLUTION INTRAVENOUS at 09:28

## 2024-05-17 RX ADMIN — TETROFOSMIN 11.2 MILLICURIE: 0.23 INJECTION, POWDER, LYOPHILIZED, FOR SOLUTION INTRAVENOUS at 07:59

## 2024-05-17 RX ADMIN — AMINOPHYLLINE 50 MG: 25 INJECTION, SOLUTION INTRAVENOUS at 09:44

## 2024-05-17 RX ADMIN — REGADENOSON 0.4 MG: 0.08 INJECTION, SOLUTION INTRAVENOUS at 09:27

## 2024-05-20 ENCOUNTER — APPOINTMENT (OUTPATIENT)
Dept: CARDIOLOGY | Facility: CLINIC | Age: 74
End: 2024-05-20
Payer: MEDICARE

## 2024-05-28 ENCOUNTER — APPOINTMENT (OUTPATIENT)
Dept: CARDIOLOGY | Facility: CLINIC | Age: 74
End: 2024-05-28
Payer: MEDICARE

## 2024-05-30 ENCOUNTER — APPOINTMENT (OUTPATIENT)
Dept: CARDIOLOGY | Facility: CLINIC | Age: 74
End: 2024-05-30
Payer: MEDICARE

## 2024-06-03 ENCOUNTER — OFFICE VISIT (OUTPATIENT)
Dept: CARDIOLOGY | Facility: CLINIC | Age: 74
End: 2024-06-03
Payer: MEDICARE

## 2024-06-03 VITALS
WEIGHT: 150.2 LBS | DIASTOLIC BLOOD PRESSURE: 58 MMHG | BODY MASS INDEX: 27.64 KG/M2 | SYSTOLIC BLOOD PRESSURE: 130 MMHG | HEART RATE: 72 BPM | HEIGHT: 62 IN

## 2024-06-03 DIAGNOSIS — I10 PRIMARY HYPERTENSION: ICD-10-CM

## 2024-06-03 DIAGNOSIS — I10 BENIGN ESSENTIAL HYPERTENSION: ICD-10-CM

## 2024-06-03 DIAGNOSIS — E78.2 MIXED HYPERLIPIDEMIA: ICD-10-CM

## 2024-06-03 DIAGNOSIS — I35.0 AORTIC STENOSIS, MODERATE: ICD-10-CM

## 2024-06-03 DIAGNOSIS — I65.29 STENOSIS OF CAROTID ARTERY, UNSPECIFIED LATERALITY: ICD-10-CM

## 2024-06-03 DIAGNOSIS — I70.0 ATHEROSCLEROSIS OF AORTA (CMS-HCC): ICD-10-CM

## 2024-06-03 DIAGNOSIS — F17.200 CURRENT EVERY DAY SMOKER: ICD-10-CM

## 2024-06-03 PROCEDURE — 99215 OFFICE O/P EST HI 40 MIN: CPT | Performed by: INTERNAL MEDICINE

## 2024-06-03 PROCEDURE — 3075F SYST BP GE 130 - 139MM HG: CPT | Performed by: INTERNAL MEDICINE

## 2024-06-03 PROCEDURE — 3078F DIAST BP <80 MM HG: CPT | Performed by: INTERNAL MEDICINE

## 2024-06-03 PROCEDURE — 1159F MED LIST DOCD IN RCRD: CPT | Performed by: INTERNAL MEDICINE

## 2024-06-03 PROCEDURE — 1157F ADVNC CARE PLAN IN RCRD: CPT | Performed by: INTERNAL MEDICINE

## 2024-06-03 NOTE — PROGRESS NOTES
Patient:  Alma Lane  YOB: 1950  MRN: [id number]       Chief Complaint/Active Symptoms:       Alma Lane is a 74 y.o. female who returns today for cardiac follow-up.  Patient has a history of aortic valve disease with moderate aortic stenosis, carotid disease.  She is also diabetic hypertensive hyperlipidemic.  She has presumably some degree of coronary disease.  She denies any new unusual symptoms or problems.  She is ambulatory without much difficulties.  She also has kidney dysfunction and diabetes followed by other physicians.  Not been experiencing any angina dyspnea syncope or near syncope.  Patient had recent nuclear scan which was normal.  We are awaiting her echo and carotid studies which are scheduled next week.      Objective:     Vitals:    06/03/24 1350   BP: 130/58   Pulse: 72       Vitals:    06/03/24 1350   Weight: 68.1 kg (150 lb 3.2 oz)       Allergies:     Allergies   Allergen Reactions    Atorvastatin Hives and Other    Ondansetron Hives    Simvastatin Other          Medications:     Current Outpatient Medications   Medication Instructions    aspirin 81 mg chewable tablet 1 tablet, oral, Daily    biotin 5,000 mcg tablet,disintegrating 1 tablet, oral, Daily    cholecalciferol (VITAMIN D-3) 125 mcg, oral, Daily    cyanocobalamin (VITAMIN B-12) 100 mcg, oral, Daily    glipiZIDE (Glucotrol) 5 mg tablet 1 by mouth up to 3 times daily as needed and directed.    losartan (Cozaar) 50 mg tablet TAKE ONE TABLET BY MOUTH TWO TIMES A DAY    metoprolol tartrate (Lopressor) 50 mg tablet TAKE ONE TABLET BY MOUTH TWO TIMES A DAY    pantoprazole (PROTONIX) 20 mg, oral, Daily before breakfast, Do not crush, chew, or split.     pioglitazone (ACTOS) 15 mg, oral, Daily    rosuvastatin (CRESTOR) 20 mg, oral, Daily       Physical Examination:   Constitutional:       Appearance: Healthy appearance. Not in distress.   Neck:      Vascular: No JVR. JVD normal.   Pulmonary:      Effort: Pulmonary effort  "is normal.      Breath sounds: Normal breath sounds. No wheezing. No rhonchi. No rales.   Chest:      Chest wall: Not tender to palpatation.   Cardiovascular:      PMI at left midclavicular line. Normal rate. Regular rhythm. Normal S1. Normal S2.       Murmurs: There is a grade 3/6 systolic murmur at the URSB, LLSB and ULSB.      No gallop.  No click. No rub.   Pulses:     Intact distal pulses.   Edema:     Peripheral edema absent.   Abdominal:      General: Bowel sounds are normal.      Palpations: Abdomen is soft.      Tenderness: There is no abdominal tenderness.   Musculoskeletal: Normal range of motion.         General: No tenderness. Skin:     General: Skin is warm and dry.   Neurological:      General: No focal deficit present.      Mental Status: Alert and oriented to person, place and time.            Lab:     CBC:   Lab Results   Component Value Date    WBC 8.0 04/10/2024    RBC 3.60 (L) 04/10/2024    HGB 11.0 (L) 04/10/2024    HCT 35.6 (L) 04/10/2024     04/10/2024        CMP:    Lab Results   Component Value Date     04/10/2024    K 4.9 04/10/2024     (H) 04/10/2024    CO2 25 04/10/2024    BUN 25 (H) 04/10/2024    CREATININE 1.36 (H) 04/10/2024    GLUCOSE 186 (H) 04/10/2024    CALCIUM 8.9 04/10/2024       Magnesium:    Lab Results   Component Value Date    MG 2.17 04/10/2024       Lipid Profile:    Lab Results   Component Value Date    TRIG 308 (H) 04/10/2024    HDL 27.6 04/10/2024    LDLCALC 18 04/10/2024       TSH:    Lab Results   Component Value Date    TSH 1.70 04/10/2024       BNP:   Lab Results   Component Value Date     (H) 09/06/2021        PT/INR:    No results found for: \"PROTIME\", \"INR\"    HgBA1c:    Lab Results   Component Value Date    HGBA1C 7.0 (H) 04/10/2024       BMP:  Lab Results   Component Value Date     04/10/2024     (L) 01/17/2024     (L) 12/07/2023    K 4.9 04/10/2024    K 4.3 01/17/2024    K 4.7 12/07/2023     (H) 04/10/2024    " " 01/17/2024     12/07/2023    CO2 25 04/10/2024    CO2 24 01/17/2024    CO2 24 12/07/2023    BUN 25 (H) 04/10/2024    BUN 26 (H) 01/17/2024    BUN 32 (H) 12/07/2023    CREATININE 1.36 (H) 04/10/2024    CREATININE 1.60 (H) 01/17/2024    CREATININE 1.86 (H) 12/07/2023       CBC:  Lab Results   Component Value Date    WBC 8.0 04/10/2024    WBC 8.6 12/07/2023    WBC 9.4 08/09/2023    WBC 9.2 04/05/2023    WBC 10.6 01/05/2023    RBC 3.60 (L) 04/10/2024    RBC 4.02 12/07/2023    RBC 3.80 (L) 08/09/2023    RBC 3.67 (L) 04/05/2023    RBC 3.87 (L) 01/05/2023    HGB 11.0 (L) 04/10/2024    HGB 12.2 12/07/2023    HGB 11.7 (L) 08/09/2023    HGB 11.4 (L) 04/05/2023    HGB 11.9 (L) 01/05/2023    HCT 35.6 (L) 04/10/2024    HCT 39.2 12/07/2023    HCT 37.6 08/09/2023    HCT 35.8 (L) 04/05/2023    HCT 37.4 01/05/2023    MCV 99 04/10/2024    MCV 98 12/07/2023    MCV 99 08/09/2023    MCV 98 04/05/2023    MCV 97 01/05/2023    MCH 30.6 04/10/2024    MCH 30.3 12/07/2023    MCHC 30.9 (L) 04/10/2024    MCHC 31.1 (L) 12/07/2023    MCHC 31.1 (L) 08/09/2023    MCHC 31.8 (L) 04/05/2023    MCHC 31.8 (L) 01/05/2023    RDW 14.1 04/10/2024    RDW 14.0 12/07/2023    RDW 14.2 08/09/2023    RDW 14.1 04/05/2023    RDW 14.5 01/05/2023     04/10/2024     12/07/2023     08/09/2023     04/05/2023     01/05/2023       Cardiac Enzymes:    No results found for: \"TROPHS\"    Hepatic Function Panel:    Lab Results   Component Value Date    ALKPHOS 51 04/10/2024    ALT 11 04/10/2024    AST 14 04/10/2024    PROT 6.6 04/10/2024    BILITOT 0.3 04/10/2024         Diagnostic Studies:     Nuclear Stress Test    Result Date: 5/17/2024  Interpreted By:  Donte Parson,  and Kwaku Kent STUDY: MYOCARDIAL PERFUSION STRESS TEST WITH LEXISCAN   [facility], [address] [place] [address] [address] [place], [place] [id number] [facility] Provider:  Donte Parson DO, [facility] PCP:  Dr. LAVELL Garrett " "Supervising provider:  Greta Alexander MD, FACC, FACP, FHRS   INDICATION: SOB;   HISTORY: Gender:  F; Age:  73 y/o ; Height:  .5 cm cm; Weight:   WT 66.225 kg kg.   High Cholesterol;  Diabetes;  HTN;  SOB;  Syncope;  COPD;  Breast CA Currently smoking.     COMPARISON: Previous nuclear testing completed on2017 at [facility].     ACCESSION NUMBER(S): [id number]   ORDERING CLINICIAN: DONTE PARSON   TECHNIQUE: ONE DAY protocol. Stress injection: Date:5-17-24, 34.3 mCi of Myoview IV 20 seconds after rapid injection of Lexiscan. Rest injection: Date: 5-17-24, 11.2 mCi of Myoview IV at rest. The patient had a rapid injection of 0.4 mg of Lexiscan IV over 10 seconds. Imaging was performed by gated tomographic technique. Reason for Lexiscan: Knee/ back pain   STRESS TEST DATA: Resting heart rate was 62 BPM. Resting blood pressure was 140/64 mmHg. Peak blood pressure was 132/62 mmHg. Peak heart rate was 85 BPM. Aminophylline given 50mg IV. TEST TERMINATED DUE TO:  Protocol completed   FINDINGS: STRESS TEST RESULTS:   Resting electrocardiogram revealed normal sinus rhythm, normal EKG. There were no significant ischemic ECG changes or dysrhythmias. The patient did not have chest abdominal pain occurred inpatient was administered aminophylline with recovery. There was a normal recovery phase.   IMAGING RESULTS:   Imaging results are good No signs of myocardial ischemia No signs of myocardial infarction Normal wall motion Ejection fraction 75% No significant artifact       Normal Lexiscan Myoview cardiac perfusion stress test. No evidence of ischemia or myocardial infarction by perfusion imaging. Normal left ventricular systolic function, ejection fraction 75%. The above study is normal. It compares favorably to a study done similarly dated 06/21/2017..   Signed by: Donte Parson 5/17/2024 11:27 AM Dictation workstation:   [id number]      EKG:   No results found for: \"EKG\"      Radiology:     No orders to display "       Assessment/Plan:         Patient Active Problem List   Diagnosis    Drug therapy    Type 2 diabetes mellitus without complication, without long-term current use of insulin (Multi)    Vitamin D deficiency    Hyperlipidemia    Renal insufficiency    Hypertension    Anxiety and depression    Chronic obstructive pulmonary disease (Multi)    Current nicotine use    Encounter for care related to vascular access port    BMI 27.0-27.9,adult    Aortic stenosis, moderate    Benign essential hypertension    Bilateral carotid bruits    Carotid artery stenosis    Chronic dyspnea    Dyspnea on exertion    Current every day smoker    Mitral valve insufficiency    Murmur, cardiac    Syncope    Atherosclerosis of aorta (CMS-HCC)    Malignant neoplasm of central portion of right female breast, unspecified estrogen receptor status (Multi)    Type 2 diabetes mellitus with stage 4 chronic kidney disease, without long-term current use of insulin (Multi)    Stage 3 chronic kidney disease, unspecified whether stage 3a or 3b CKD (Multi)         ASSESSMENT       74-year-old female seen evaluate today in routine follow-up.    Meds, vitals, examination as noted.    Chart was reviewed in detail discussed with the patient at length.    Impression:  Moderate aortic stenosis  Hypertension  Hyperlipidemia  Type 2 diabetes  Stage III kidney disease  History of syncope  COPD  Smoking abuse  PLAN     Recommendation:  Continue current meds  Complete echo and carotid study next week  Call results with the patient  Maintain exercise and activities as tolerated  See me back in the fall to winter of this year  Any condition change notify our office  Did discuss potential progression of valve disease which could lead to procedures such as TAVR in the future

## 2024-06-03 NOTE — PATIENT INSTRUCTIONS
Continue same medications/treatment.  Patient educated on proper medication use.  Patient educated on risk factor modification.  Please bring any lab results from other providers/physicians to your next appointment.    Please bring all medicines, vitamins, and herbal supplements with you when you come to the office.    Prescriptions will not be filled unless you are compliant with your follow up appointments or have a follow up appointment scheduled as per instruction of your physician. Refills should be requested at the time of your visit.    Follow up in Oct/Nov    I, BENJAMIN GUZMAN RN, AM SCRIBING FOR AND IN THE PRESENCE OF DR. UMU CLAROS, DO, FACC

## 2024-06-07 DIAGNOSIS — K21.9 GASTROESOPHAGEAL REFLUX DISEASE, UNSPECIFIED WHETHER ESOPHAGITIS PRESENT: ICD-10-CM

## 2024-06-07 DIAGNOSIS — I10 HYPERTENSION, UNSPECIFIED TYPE: ICD-10-CM

## 2024-06-10 RX ORDER — METOPROLOL TARTRATE 50 MG/1
TABLET ORAL
Qty: 180 TABLET | Refills: 3 | Status: SHIPPED | OUTPATIENT
Start: 2024-06-10

## 2024-06-10 RX ORDER — PANTOPRAZOLE SODIUM 20 MG/1
TABLET, DELAYED RELEASE ORAL
Qty: 90 TABLET | Refills: 0 | Status: SHIPPED | OUTPATIENT
Start: 2024-06-10

## 2024-06-10 NOTE — TELEPHONE ENCOUNTER
Received request for prescription refills for patient.   Patient follows with Donte Parson D.O.      Request is for Metoprolol  Is patient currently on medication yes    Last OV 6/3/24  Next OV 10/31/24    Pended for signing and sent to provider

## 2024-06-12 ENCOUNTER — HOSPITAL ENCOUNTER (OUTPATIENT)
Dept: CARDIOLOGY | Facility: CLINIC | Age: 74
Discharge: HOME | End: 2024-06-12
Payer: MEDICARE

## 2024-06-12 DIAGNOSIS — R09.89 BILATERAL CAROTID BRUITS: ICD-10-CM

## 2024-06-12 PROCEDURE — 93880 EXTRACRANIAL BILAT STUDY: CPT

## 2024-06-12 PROCEDURE — 93880 EXTRACRANIAL BILAT STUDY: CPT | Performed by: INTERNAL MEDICINE

## 2024-07-03 ENCOUNTER — INFUSION (OUTPATIENT)
Dept: HEMATOLOGY/ONCOLOGY | Facility: CLINIC | Age: 74
End: 2024-07-03
Payer: MEDICARE

## 2024-07-03 VITALS
WEIGHT: 150 LBS | SYSTOLIC BLOOD PRESSURE: 163 MMHG | OXYGEN SATURATION: 98 % | HEART RATE: 90 BPM | TEMPERATURE: 98.1 F | HEIGHT: 62 IN | RESPIRATION RATE: 20 BRPM | DIASTOLIC BLOOD PRESSURE: 69 MMHG | BODY MASS INDEX: 27.6 KG/M2

## 2024-07-03 DIAGNOSIS — Z45.2 ENCOUNTER FOR CARE RELATED TO VASCULAR ACCESS PORT: ICD-10-CM

## 2024-07-03 PROCEDURE — 2500000004 HC RX 250 GENERAL PHARMACY W/ HCPCS (ALT 636 FOR OP/ED): Performed by: SURGERY

## 2024-07-03 PROCEDURE — 96523 IRRIG DRUG DELIVERY DEVICE: CPT

## 2024-07-03 RX ORDER — HEPARIN 100 UNIT/ML
5 SYRINGE INTRAVENOUS ONCE
Status: COMPLETED | OUTPATIENT
Start: 2024-07-03 | End: 2024-07-03

## 2024-07-03 ASSESSMENT — PAIN SCALES - GENERAL: PAINLEVEL: 0-NO PAIN

## 2024-07-03 NOTE — PROGRESS NOTES
Port needle flushed with 20 ml NS and 5 ml heparin lock flush (100 units/ml).  Needle removed intact.  No bleeding noted.  Dressing applied.   04-Apr-2022 10:38

## 2024-07-19 DIAGNOSIS — E78.2 MIXED HYPERLIPIDEMIA: ICD-10-CM

## 2024-07-22 RX ORDER — ROSUVASTATIN CALCIUM 20 MG/1
20 TABLET, COATED ORAL DAILY
Qty: 90 TABLET | Refills: 3 | Status: SHIPPED | OUTPATIENT
Start: 2024-07-22

## 2024-07-22 NOTE — TELEPHONE ENCOUNTER
Received request for prescription refills for patient.   Patient follows with Dr. Parson    Request is for Crestor  Is patient currently on medication yes    Last OV 6/3/2024  Next OV 10/31/2024    Pended for signing and sent to provider

## 2024-08-15 ENCOUNTER — LAB (OUTPATIENT)
Dept: LAB | Facility: LAB | Age: 74
End: 2024-08-15
Payer: MEDICARE

## 2024-08-15 DIAGNOSIS — E11.22 TYPE 2 DIABETES MELLITUS WITH DIABETIC CHRONIC KIDNEY DISEASE (MULTI): ICD-10-CM

## 2024-08-15 DIAGNOSIS — R79.9 ABNORMAL FINDING OF BLOOD CHEMISTRY, UNSPECIFIED: ICD-10-CM

## 2024-08-15 DIAGNOSIS — E11.9 TYPE 2 DIABETES MELLITUS WITHOUT COMPLICATION, WITHOUT LONG-TERM CURRENT USE OF INSULIN (MULTI): ICD-10-CM

## 2024-08-15 DIAGNOSIS — I12.9 HYPERTENSIVE CHRONIC KIDNEY DISEASE WITH STAGE 1 THROUGH STAGE 4 CHRONIC KIDNEY DISEASE, OR UNSPECIFIED CHRONIC KIDNEY DISEASE: ICD-10-CM

## 2024-08-15 DIAGNOSIS — Z13.89 ENCOUNTER FOR SCREENING FOR OTHER DISORDER: ICD-10-CM

## 2024-08-15 DIAGNOSIS — N18.32 CHRONIC KIDNEY DISEASE, STAGE 3B (MULTI): Primary | ICD-10-CM

## 2024-08-15 DIAGNOSIS — D64.9 ANEMIA, UNSPECIFIED TYPE: ICD-10-CM

## 2024-08-15 LAB
ANION GAP SERPL CALC-SCNC: 13 MMOL/L (ref 10–20)
BASOPHILS # BLD AUTO: 0.05 X10*3/UL (ref 0–0.1)
BASOPHILS NFR BLD AUTO: 0.6 %
BUN SERPL-MCNC: 35 MG/DL (ref 6–23)
CALCIUM SERPL-MCNC: 9.3 MG/DL (ref 8.6–10.3)
CHLORIDE SERPL-SCNC: 104 MMOL/L (ref 98–107)
CO2 SERPL-SCNC: 25 MMOL/L (ref 21–32)
CREAT SERPL-MCNC: 1.56 MG/DL (ref 0.5–1.05)
EGFRCR SERPLBLD CKD-EPI 2021: 34 ML/MIN/1.73M*2
EOSINOPHIL # BLD AUTO: 0.21 X10*3/UL (ref 0–0.4)
EOSINOPHIL NFR BLD AUTO: 2.6 %
ERYTHROCYTE [DISTWIDTH] IN BLOOD BY AUTOMATED COUNT: 13.9 % (ref 11.5–14.5)
EST. AVERAGE GLUCOSE BLD GHB EST-MCNC: 151 MG/DL
FERRITIN SERPL-MCNC: 65 NG/ML (ref 8–150)
FOLATE SERPL-MCNC: >22.3 NG/ML
GLUCOSE SERPL-MCNC: 206 MG/DL (ref 74–99)
HBA1C MFR BLD: 6.9 %
HCT VFR BLD AUTO: 38.7 % (ref 36–46)
HGB BLD-MCNC: 12.1 G/DL (ref 12–16)
IMM GRANULOCYTES # BLD AUTO: 0.04 X10*3/UL (ref 0–0.5)
IMM GRANULOCYTES NFR BLD AUTO: 0.5 % (ref 0–0.9)
IRON SATN MFR SERPL: 16 % (ref 25–45)
IRON SERPL-MCNC: 56 UG/DL (ref 35–150)
LYMPHOCYTES # BLD AUTO: 3.4 X10*3/UL (ref 0.8–3)
LYMPHOCYTES NFR BLD AUTO: 42.3 %
MCH RBC QN AUTO: 30.9 PG (ref 26–34)
MCHC RBC AUTO-ENTMCNC: 31.3 G/DL (ref 32–36)
MCV RBC AUTO: 99 FL (ref 80–100)
MONOCYTES # BLD AUTO: 0.73 X10*3/UL (ref 0.05–0.8)
MONOCYTES NFR BLD AUTO: 9.1 %
NEUTROPHILS # BLD AUTO: 3.61 X10*3/UL (ref 1.6–5.5)
NEUTROPHILS NFR BLD AUTO: 44.9 %
NRBC BLD-RTO: 0 /100 WBCS (ref 0–0)
PHOSPHATE SERPL-MCNC: 3.5 MG/DL (ref 2.5–4.9)
PLATELET # BLD AUTO: 233 X10*3/UL (ref 150–450)
POTASSIUM SERPL-SCNC: 5.4 MMOL/L (ref 3.5–5.3)
PTH-INTACT SERPL-MCNC: 35.5 PG/ML (ref 18.5–88)
RBC # BLD AUTO: 3.91 X10*6/UL (ref 4–5.2)
SODIUM SERPL-SCNC: 137 MMOL/L (ref 136–145)
TIBC SERPL-MCNC: 347 UG/DL (ref 240–445)
UIBC SERPL-MCNC: 291 UG/DL (ref 110–370)
VIT B12 SERPL-MCNC: 1215 PG/ML (ref 211–911)
WBC # BLD AUTO: 8 X10*3/UL (ref 4.4–11.3)

## 2024-08-15 PROCEDURE — 82607 VITAMIN B-12: CPT

## 2024-08-15 PROCEDURE — 36415 COLL VENOUS BLD VENIPUNCTURE: CPT

## 2024-08-15 PROCEDURE — 85025 COMPLETE CBC W/AUTO DIFF WBC: CPT

## 2024-08-15 PROCEDURE — 82746 ASSAY OF FOLIC ACID SERUM: CPT

## 2024-08-15 PROCEDURE — 82728 ASSAY OF FERRITIN: CPT

## 2024-08-15 PROCEDURE — 83036 HEMOGLOBIN GLYCOSYLATED A1C: CPT

## 2024-08-15 PROCEDURE — 80048 BASIC METABOLIC PNL TOTAL CA: CPT

## 2024-08-15 PROCEDURE — 84100 ASSAY OF PHOSPHORUS: CPT

## 2024-08-15 PROCEDURE — 83540 ASSAY OF IRON: CPT

## 2024-08-15 PROCEDURE — 83970 ASSAY OF PARATHORMONE: CPT

## 2024-08-15 PROCEDURE — 83550 IRON BINDING TEST: CPT

## 2024-08-19 ENCOUNTER — APPOINTMENT (OUTPATIENT)
Dept: PRIMARY CARE | Facility: CLINIC | Age: 74
End: 2024-08-19
Payer: MEDICARE

## 2024-08-19 VITALS
HEIGHT: 62 IN | OXYGEN SATURATION: 94 % | BODY MASS INDEX: 27.79 KG/M2 | WEIGHT: 151 LBS | SYSTOLIC BLOOD PRESSURE: 127 MMHG | HEART RATE: 64 BPM | DIASTOLIC BLOOD PRESSURE: 56 MMHG

## 2024-08-19 DIAGNOSIS — M25.561 CHRONIC PAIN OF BOTH KNEES: Primary | ICD-10-CM

## 2024-08-19 DIAGNOSIS — M25.562 CHRONIC PAIN OF BOTH KNEES: Primary | ICD-10-CM

## 2024-08-19 DIAGNOSIS — I10 PRIMARY HYPERTENSION: ICD-10-CM

## 2024-08-19 DIAGNOSIS — Z79.899 DRUG THERAPY: ICD-10-CM

## 2024-08-19 DIAGNOSIS — G89.29 CHRONIC PAIN OF BOTH KNEES: Primary | ICD-10-CM

## 2024-08-19 DIAGNOSIS — N28.9 RENAL INSUFFICIENCY: ICD-10-CM

## 2024-08-19 DIAGNOSIS — D64.9 ANEMIA, UNSPECIFIED TYPE: ICD-10-CM

## 2024-08-19 DIAGNOSIS — I70.0 ATHEROSCLEROSIS OF AORTA (CMS-HCC): ICD-10-CM

## 2024-08-19 DIAGNOSIS — E55.9 VITAMIN D DEFICIENCY: ICD-10-CM

## 2024-08-19 DIAGNOSIS — J44.9 CHRONIC OBSTRUCTIVE PULMONARY DISEASE, UNSPECIFIED COPD TYPE (MULTI): ICD-10-CM

## 2024-08-19 DIAGNOSIS — Z72.0 CURRENT NICOTINE USE: ICD-10-CM

## 2024-08-19 DIAGNOSIS — E11.9 TYPE 2 DIABETES MELLITUS WITHOUT COMPLICATION, WITHOUT LONG-TERM CURRENT USE OF INSULIN (MULTI): ICD-10-CM

## 2024-08-19 DIAGNOSIS — E78.2 MIXED HYPERLIPIDEMIA: ICD-10-CM

## 2024-08-19 PROCEDURE — 99214 OFFICE O/P EST MOD 30 MIN: CPT | Performed by: FAMILY MEDICINE

## 2024-08-19 PROCEDURE — 4004F PT TOBACCO SCREEN RCVD TLK: CPT | Performed by: FAMILY MEDICINE

## 2024-08-19 PROCEDURE — 1159F MED LIST DOCD IN RCRD: CPT | Performed by: FAMILY MEDICINE

## 2024-08-19 PROCEDURE — 3074F SYST BP LT 130 MM HG: CPT | Performed by: FAMILY MEDICINE

## 2024-08-19 PROCEDURE — 1157F ADVNC CARE PLAN IN RCRD: CPT | Performed by: FAMILY MEDICINE

## 2024-08-19 PROCEDURE — 3078F DIAST BP <80 MM HG: CPT | Performed by: FAMILY MEDICINE

## 2024-08-19 NOTE — PROGRESS NOTES
"Subjective   Patient ID: Alma Lane is a 74 y.o. female who presents for 4 Month FU (Pt in today for routine 4 month FU).    Review of Systems  Denies N/V/D/C, no HA/S/V, denies rashes/lesions, no CP/SOB. Denies fevers/chills.  All other systems were negative.     Objective   /56 (BP Location: Left arm, Patient Position: Sitting)   Pulse 64   Ht 1.575 m (5' 2\")   Wt 68.5 kg (151 lb)   SpO2 94%   BMI 27.62 kg/m²     Physical Exam  Gen: NAD  eyes: EOMI, PERRLA  ENT: hearing grossly intact, no nasal discharge  resp: CTABL, without R/R  heart: RRR without MRG  GI: abd: S/ND/NT, BS+  lymph: no axillary, cervical, supraclavicular lymphadenopathy noted   MS: gait grossly WNL,  derm: no rashes or lesions noted  neuro: CN II-XII grossly intact  psych: A&Ox3    Assessment/Plan   Problem List Items Addressed This Visit             ICD-10-CM    Drug therapy Z79.899    Type 2 diabetes mellitus without complication, without long-term current use of insulin (Multi) E11.9    Vitamin D deficiency E55.9    Hyperlipidemia E78.5    Renal insufficiency N28.9    Hypertension I10    Chronic obstructive pulmonary disease (Multi) J44.9    Current nicotine use Z72.0    Atherosclerosis of aorta (CMS-HCC) I70.0    Chronic pain of both knees - Primary M25.561, M25.562, G89.29    Relevant Orders    Referral to Orthopaedic Surgery    Disability Placard     Other Visit Diagnoses         Codes    Anemia, unspecified type     D64.9              Patient Discussion/Summary     [facility]      ----  - Negative screen for hepatitis C August 2017.  - Screening for colon cancer, patient had colonoscopy around 2019, told to come back in 10 years for next, so that will be around 2029.  - Breast cancer history, seeing Humberto Tee and Aleisha. -->> f/u as planned.     Need for immunizations. Up-to-date on coronavirus series including latest boosters. Also up-to-date on annual flu shot. Up-to-date on both " pneumonia shots as well as the new RSV immunization.  is also due for tetanus shot as well as the new shingles series. -->>  Check with your pharmacy to stay up-to-date on your immunizations.  ---------------------     Overweight, BMI 27, up 5 pounds since last visit here.  Weight is pretty stable over the last several years. Patient does note she is eating a little better.  Most recently, patient has been less active due to knee pain. -->> Continue to limit simple carbohydrates like bread, pasta, potatoes, rice, sugars in general.     Smoking about 1/2 ppd. Improved from a pack or more daily -->> keep up the excellent work!  Of course I do advise you to completely quit.     Cardiac CT calcium scoring result was a zero, excellent. Pt does have h/o carotid artery stenosis, coronary artery disease/atherosclerosis, holosystolic murmur. -->> Follow-up with cardiology/Dr. Parson.    Bilateral knee pain and low back pain.  Knee pain has been pretty significant recently x 6 mo, also getting right knee swelling at times.  So bad cannot get in and out of the bathtub, has to just shower. -->> Will refer to orthopedics for evaluation and treatment.      new labs reviewed:     Around August will check anemia panel, A1c.    - Type 2 diabetes, A1c is 6.9, 7.0, 7.3, 6.5, 7.2, 7.7, 7.0, 6.8, 6.4, 6.6, 6.7, 6.4, 5.8, 5.3, 6.1. We will just recheck in about 3 months. On glipizide 5mg 3 times per day, but in general has pretty much been taken 3 times daily, usually only takes it when sugar is above 150. Has occasional problems with sugar going too low. Patient's daughter did have a type of thyroid cancer, not sure what type. Never started Januvia, it was not covered.  Unable to take acarbose due to kidney function.->>  Continue glipizide.  We prescribed Actos previously but patient has not yet started taking it.  She is going to check with nephrology to verify they think it is okay to take actos low-dose 15 mg.    - History of  hypochromic anemia, iron deficiency.  Anemia resolved at this time.  Percent saturation 16%.  B12 and folic acid within normal limits.  Patient is eating more servings of iron rich foods. -->>  Keep up the good work.  Will recheck with next annual labs.    - Renal insufficiency/CKD estimated GFR 34, was 40, 26, 32, 37,  34, 39, 35, 35, 34, 36, . Seeing Dr. Garcia. -->> f/u with nephrology as planned. Looks like patient is getting labs with nephrology every 4 months. We will try to synchronize our labs around december with nephrology labs.      Regarding previous labs:    - Hyperlipidemia, Luis E 18, on rosuvastatin/Crestor 20 mg daily along with coenzyme Q 10 over-the-counter. -->> Continue current treatment. Will recheck with next annual labs.    - Vitamin D deficiency, 68, was 63, 38, 37, 57, 54, 60. Goal is . On daily vitamin D, our best guess is she is taking 2000 units, that is 1000 units 2 pills daily. -->>  Increase your dose of vitamin D a little bit.  For example try taking a double dose every Monday and Friday.  And we will recheck in 12 months.      Hypertension, blood pressure good today. In general numbers pretty good on chart. -->> Follow-up with cardiology as planned.      COPD, history of AECOPD end of 2021, but not since in general doing well, not needing the albuterol more than a couple times a year.    Anxiety and depression. Patient does well without meds, with the assistance of her cat. The cat helps her perform activities of daily living without getting as stressed. Patient is definitely calmer and less stressed with cat around. -->> we will sign papers as needed for [facility] stating patient has a support animal. Or could write a letter if needed.     GERD. Doing well since she restarted the pantoprazole. -->> continue pantoprazole 20 mg daily.            - Patient will return in mid December for I/o A1c.

## 2024-08-19 NOTE — PATIENT INSTRUCTIONS
Patient Discussion/Summary     [place] will be next annual prevent[place] [facility]      ----  - Negative screen for hepatitis C August 2017.  - Screening for colon cancer, patient had colonoscopy around 2019, told to come back in 10 years for next, so that will be around 2029.  - Breast cancer history, seeing Humberto Tee and Aleisha. -->> f/u as planned.     Need for immunizations. Up-to-date on coronavirus series including latest boosters. Also up-to-date on annual flu shot. Up-to-date on both pneumonia shots as well as the new RSV immunization.  is also due for tetanus shot as well as the new shingles series. -->>  Check with your pharmacy to stay up-to-date on your immunizations.  ---------------------     Overweight, BMI 27, up 5 pounds since last visit here.  Weight is pretty stable over the last several years. Patient does note she is eating a little better.  Most recently, patient has been less active due to knee pain. -->> Continue to limit simple carbohydrates like bread, pasta, potatoes, rice, sugars in general.     Smoking about 1/2 ppd. Improved from a pack or more daily -->> keep up the excellent work!  Of course I do advise you to completely quit.     Cardiac CT calcium scoring result was a zero, excellent. Pt does have h/o carotid artery stenosis, coronary artery disease/atherosclerosis, holosystolic murmur. -->> Follow-up with cardiology/Dr. Parson.    Bilateral knee pain and low back pain.  Knee pain has been pretty significant recently x 6 mo, also getting right knee swelling at times.  So bad cannot get in and out of the bathtub, has to just shower. -->> Will refer to orthopedics for evaluation and treatment.      new labs reviewed:     Around August will check anemia panel, A1c.    - Type 2 diabetes, A1c is 6.9, 7.0, 7.3, 6.5, 7.2, 7.7, 7.0, 6.8, 6.4, 6.6, 6.7, 6.4, 5.8, 5.3, 6.1. We will just recheck in about 3 months. On glipizide 5mg 3 times per day, but in general has pretty much been taken 3  times daily, usually only takes it when sugar is above 150. Has occasional problems with sugar going too low. Patient's daughter did have a type of thyroid cancer, not sure what type. Never started Januvia, it was not covered.  Unable to take acarbose due to kidney function.->>  Continue glipizide.  We prescribed Actos previously but patient has not yet started taking it.  She is going to check with nephrology to verify they think it is okay to take actos low-dose 15 mg.    - History of hypochromic anemia, iron deficiency.  Anemia resolved at this time.  Percent saturation 16%.  B12 and folic acid within normal limits.  Patient is eating more servings of iron rich foods. -->>  Keep up the good work.  Will recheck with next annual labs.    - Renal insufficiency/CKD estimated GFR 34, was 40, 26, 32, 37,  34, 39, 35, 35, 34, 36, . Seeing Dr. Garcia. -->> f/u with nephrology as planned. Looks like patient is getting labs with nephrology every 4 months. We will try to synchronize our labs around december with nephrology labs.      Regarding previous labs:    - Hyperlipidemia, Luis E 18, on rosuvastatin/Crestor 20 mg daily along with coenzyme Q 10 over-the-counter. -->> Continue current treatment. Will recheck with next annual labs.    - Vitamin D deficiency, 68, was 63, 38, 37, 57, 54, 60. Goal is . On daily vitamin D, our best guess is she is taking 2000 units, that is 1000 units 2 pills daily. -->>  Increase your dose of vitamin D a little bit.  For example try taking a double dose every Monday and Friday.  And we will recheck in 12 months.      Hypertension, blood pressure good today. In general numbers pretty good on chart. -->> Follow-up with cardiology as planned.      COPD, history of AECOPD end of 2021, but not since in general doing well, not needing the albuterol more than a couple times a year.    Anxiety and depression. Patient does well without meds, with the assistance of her cat. The cat helps her  perform activities of daily living without getting as stressed. Patient is definitely calmer and less stressed with cat around. -->> we will sign papers as needed for landlord stating patient has a support animal. Or could write a letter if needed.     GERD. Doing well since she restarted the pantoprazole. -->> continue pantoprazole 20 mg daily.            - Patient will return in mid december for I/o A1c.

## 2024-08-28 ENCOUNTER — APPOINTMENT (OUTPATIENT)
Dept: HEMATOLOGY/ONCOLOGY | Facility: CLINIC | Age: 74
End: 2024-08-28
Payer: MEDICARE

## 2024-08-29 ENCOUNTER — TELEPHONE (OUTPATIENT)
Dept: PRIMARY CARE | Facility: CLINIC | Age: 74
End: 2024-08-29
Payer: MEDICARE

## 2024-08-29 DIAGNOSIS — E11.22 TYPE 2 DIABETES MELLITUS WITH STAGE 4 CHRONIC KIDNEY DISEASE, WITHOUT LONG-TERM CURRENT USE OF INSULIN (MULTI): ICD-10-CM

## 2024-08-29 DIAGNOSIS — N18.4 TYPE 2 DIABETES MELLITUS WITH STAGE 4 CHRONIC KIDNEY DISEASE, WITHOUT LONG-TERM CURRENT USE OF INSULIN (MULTI): ICD-10-CM

## 2024-08-29 RX ORDER — PIOGLITAZONEHYDROCHLORIDE 15 MG/1
15 TABLET ORAL DAILY
Qty: 100 TABLET | Refills: 1 | Status: SHIPPED | OUTPATIENT
Start: 2024-08-29 | End: 2025-08-29

## 2024-08-30 ENCOUNTER — APPOINTMENT (OUTPATIENT)
Dept: ORTHOPEDIC SURGERY | Facility: CLINIC | Age: 74
End: 2024-08-30
Payer: MEDICARE

## 2024-09-03 NOTE — PROGRESS NOTES
History of Present Illness:  Alma Lane is a 74 y.o. female presenting to clinic with complaints of chronic bilateral knee pain for about the last 6 months.  She recalls no specific injury.  Describes the knees as being generally sore and achy with occasional sharp pain.  She has been taking Tylenol with mild improvement.  Patient does have stage III CKD and high blood pressure, unable to take NSAIDs.    Imaging:  X-rays bilateral knee: No acute fractures or dislocations.  Shows mild to moderate bilateral arthritis, mostly patellofemoral.     Assessment:   Bilateral knee arthritis flare    Plan:  We reviewed the role of imaging, physical therapy, injections and the time frame to healing and correlation with outcome.  Medrol Dosepak  Ice: 60 minutes on and off  Knee brace  Exercise home program: Medically directed knee therapy / Handout given  Follow-up in 4 weeks.      Physical Exam:  Well-nourished, well-developed. No acute distress. Alert and oriented x3. Responds appropriately to questioning. Good mood. Normal affect.  Physical Exam  Bilateral Knee:  ROM: 0 to 110 degrees of flexion bilaterally.  Bilateral knees tenderness to palpation of the medial and lateral joint line.  Positive César´s test/Positive Apley Grind  Neurovascular exam normal distally  Palpable pedal pulse and good cap refill     Review of Systems:  GENERAL: Negative for malaise, significant weight loss, fever  MUSCULOSKELETAL: See HPI  NEURO:  Negative     Past Medical History:   Diagnosis Date    Breast cancer (Multi)     Chronic obstructive pulmonary disease with (acute) exacerbation (Multi) 04/15/2022    Acute exacerbation of chronic obstructive pulmonary disease (COPD)    Hx antineoplastic chemo     Personal history of diseases of the blood and blood-forming organs and certain disorders involving the immune mechanism     History of anemia    Personal history of irradiation     Personal history of other diseases of the  musculoskeletal system and connective tissue     History of arthritis    Personal history of other diseases of the respiratory system     History of chronic obstructive lung disease    Personal history of other diseases of the respiratory system     History of upper respiratory infection    Personal history of other diseases of the respiratory system     History of acute pharyngitis    Personal history of other diseases of the respiratory system 10/25/2016    History of acute pharyngitis    Personal history of other diseases of the respiratory system     History of pulmonary emphysema    Personal history of other endocrine, nutritional and metabolic disease     History of hyperlipidemia    Personal history of other endocrine, nutritional and metabolic disease     History of diabetes mellitus    Personal history of other specified conditions     History of chest pain    Shortness of breath     Shortness of breath at rest       Medication Documentation Review Audit       Reviewed by Kamlesh Bui CMA (Medical Assistant) on 08/19/24 at 1026      Medication Order Taking? Sig Documenting Provider Last Dose Status   aspirin 81 mg chewable tablet [id number] No Chew 1 tablet (81 mg) once daily. Historical Provider, MD Taking Active   biotin 5,000 mcg tablet,disintegrating [id number] No Take 1 tablet by mouth once daily. Historical Provider, MD Taking Active   cholecalciferol (Vitamin D-3) 125 MCG (5000 UT) capsule [id number] No Take 1 capsule (125 mcg) by mouth once daily. Historical Provider, MD Taking Active   cyanocobalamin (Vitamin B-12) 100 mcg tablet [id number] No Take 1 tablet (100 mcg) by mouth once daily. Historical Provider, MD Taking Active   glipiZIDE (Glucotrol) 5 mg tablet [id number] No 1 by mouth up to 3 times daily as needed and directed. Yogesh Garrett,  Taking Active   losartan (Cozaar) 50 mg tablet [id number] No TAKE ONE TABLET BY MOUTH TWO TIMES A DAY Donte Parson DO Taking Active   metoprolol  tartrate (Lopressor) 50 mg tablet [id number]  TAKE ONE TABLET BY MOUTH TWO TIMES A DAY Donte Parson, DO  Active   pantoprazole (ProtoNix) 20 mg EC tablet [id number]  TAKE ONE TABLET BY MOUTH EVERY MORNING BEFORE A MEAL. DO NOT CRUSH, CHEW, OR SPLIT. Yogesh Garrett, DO  Active   pioglitazone (Actos) 15 mg tablet [id number] No Take 1 tablet (15 mg) by mouth once daily. Yogesh Garrett DO Taking Active   rosuvastatin (Crestor) 20 mg tablet [id number]  TAKE ONE TABLET BY MOUTH EVERY DAY Josh Waterman MD  Active                    Allergies   Allergen Reactions    Atorvastatin Hives and Other    Ondansetron Hives    Simvastatin Other       Social History     Socioeconomic History    Marital status:      Spouse name: Not on file    Number of children: Not on file    Years of education: Not on file    Highest education level: Not on file   Occupational History    Not on file   Tobacco Use    Smoking status: Every Day     Current packs/day: 0.50     Average packs/day: 0.5 packs/day for 55.7 years (27.8 ttl pk-yrs)     Types: Cigarettes     Start date: 1969    Smokeless tobacco: Never   Substance and Sexual Activity    Alcohol use: Not Currently    Drug use: Not Currently    Sexual activity: Not on file   Other Topics Concern    Not on file   Social History Narrative    Not on file     Social Determinants of Health     Financial Resource Strain: Not on file   Food Insecurity: Not on file   Transportation Needs: Not on file   Physical Activity: Not on file   Stress: Not on file   Social Connections: Not on file   Intimate Partner Violence: Not on file   Housing Stability: Not on file       Past Surgical History:   Procedure Laterality Date    APPENDECTOMY  10/25/2016    Appendectomy    BREAST LUMPECTOMY      BREAST SURGERY  10/25/2016    Breast Surgery    COLONOSCOPY  10/25/2016    Complete Colonoscopy    CT ANGIO NECK  10/04/2021    CT NECK ANGIO W AND WO IV CONTRAST 10/4/2021 [facility]     MOUTH SURGERY  10/25/2016    Oral Surgery Tooth Extraction    OTHER SURGICAL HISTORY  01/21/2022    Ganglion cyst excision       No results found.       Scribe Attestation  By signing my name below, I, Tommie Anders   attest that this documentation has been prepared under the direction and in the presence of Asa Cabral MD.

## 2024-09-04 ENCOUNTER — INFUSION (OUTPATIENT)
Dept: HEMATOLOGY/ONCOLOGY | Facility: CLINIC | Age: 74
End: 2024-09-04
Payer: MEDICARE

## 2024-09-04 VITALS
HEART RATE: 76 BPM | TEMPERATURE: 97.2 F | RESPIRATION RATE: 20 BRPM | DIASTOLIC BLOOD PRESSURE: 68 MMHG | SYSTOLIC BLOOD PRESSURE: 163 MMHG | OXYGEN SATURATION: 98 %

## 2024-09-04 DIAGNOSIS — Z45.2 ENCOUNTER FOR CARE RELATED TO VASCULAR ACCESS PORT: ICD-10-CM

## 2024-09-04 PROCEDURE — 96523 IRRIG DRUG DELIVERY DEVICE: CPT

## 2024-09-04 PROCEDURE — 2500000004 HC RX 250 GENERAL PHARMACY W/ HCPCS (ALT 636 FOR OP/ED): Performed by: SURGERY

## 2024-09-04 RX ORDER — HEPARIN 100 UNIT/ML
100 SYRINGE INTRAVENOUS ONCE
Status: DISCONTINUED | OUTPATIENT
Start: 2024-09-04 | End: 2024-09-04

## 2024-09-04 RX ORDER — HEPARIN 100 UNIT/ML
5 SYRINGE INTRAVENOUS ONCE
Status: COMPLETED | OUTPATIENT
Start: 2024-09-04 | End: 2024-09-04

## 2024-09-04 ASSESSMENT — PAIN SCALES - GENERAL: PAINLEVEL: 0-NO PAIN

## 2024-09-04 NOTE — PROGRESS NOTES
Port needle flushed with 20 ml NS and 5 ml heparin lock flush (100 units/ml).  Needle removed intact.  No bleeding noted.  Dressing applied.

## 2024-09-06 ENCOUNTER — OFFICE VISIT (OUTPATIENT)
Dept: ORTHOPEDIC SURGERY | Facility: CLINIC | Age: 74
End: 2024-09-06
Payer: MEDICARE

## 2024-09-06 ENCOUNTER — HOSPITAL ENCOUNTER (OUTPATIENT)
Dept: RADIOLOGY | Facility: CLINIC | Age: 74
Discharge: HOME | End: 2024-09-06
Payer: MEDICARE

## 2024-09-06 VITALS — HEIGHT: 62 IN | WEIGHT: 154 LBS | BODY MASS INDEX: 28.34 KG/M2

## 2024-09-06 DIAGNOSIS — G89.29 CHRONIC PAIN OF BOTH KNEES: ICD-10-CM

## 2024-09-06 DIAGNOSIS — M25.562 CHRONIC PAIN OF BOTH KNEES: ICD-10-CM

## 2024-09-06 DIAGNOSIS — M17.10 ARTHRITIS OF KNEE: Primary | ICD-10-CM

## 2024-09-06 DIAGNOSIS — M25.561 CHRONIC PAIN OF BOTH KNEES: ICD-10-CM

## 2024-09-06 PROCEDURE — 73564 X-RAY EXAM KNEE 4 OR MORE: CPT | Mod: 50

## 2024-09-06 PROCEDURE — 99214 OFFICE O/P EST MOD 30 MIN: CPT | Performed by: ORTHOPAEDIC SURGERY

## 2024-09-06 RX ORDER — METHYLPREDNISOLONE 4 MG/1
TABLET ORAL
Qty: 21 TABLET | Refills: 0 | Status: SHIPPED | OUTPATIENT
Start: 2024-09-06

## 2024-09-13 DIAGNOSIS — E11.22 TYPE 2 DIABETES MELLITUS WITH STAGE 4 CHRONIC KIDNEY DISEASE, WITHOUT LONG-TERM CURRENT USE OF INSULIN (MULTI): ICD-10-CM

## 2024-09-13 DIAGNOSIS — N18.4 TYPE 2 DIABETES MELLITUS WITH STAGE 4 CHRONIC KIDNEY DISEASE, WITHOUT LONG-TERM CURRENT USE OF INSULIN (MULTI): ICD-10-CM

## 2024-09-16 RX ORDER — PIOGLITAZONEHYDROCHLORIDE 15 MG/1
15 TABLET ORAL DAILY
Qty: 100 TABLET | Refills: 1 | Status: SHIPPED | OUTPATIENT
Start: 2024-09-16 | End: 2025-09-16

## 2024-09-24 ENCOUNTER — OFFICE VISIT (OUTPATIENT)
Dept: PRIMARY CARE | Facility: CLINIC | Age: 74
End: 2024-09-24
Payer: MEDICARE

## 2024-09-24 VITALS
WEIGHT: 151 LBS | HEIGHT: 62 IN | HEART RATE: 72 BPM | SYSTOLIC BLOOD PRESSURE: 170 MMHG | DIASTOLIC BLOOD PRESSURE: 82 MMHG | BODY MASS INDEX: 27.79 KG/M2 | OXYGEN SATURATION: 96 %

## 2024-09-24 DIAGNOSIS — E11.9 TYPE 2 DIABETES MELLITUS WITHOUT COMPLICATION, WITHOUT LONG-TERM CURRENT USE OF INSULIN (MULTI): ICD-10-CM

## 2024-09-24 DIAGNOSIS — E11.649: Primary | ICD-10-CM

## 2024-09-24 PROCEDURE — 4004F PT TOBACCO SCREEN RCVD TLK: CPT | Performed by: FAMILY MEDICINE

## 2024-09-24 PROCEDURE — 3077F SYST BP >= 140 MM HG: CPT | Performed by: FAMILY MEDICINE

## 2024-09-24 PROCEDURE — 99214 OFFICE O/P EST MOD 30 MIN: CPT | Performed by: FAMILY MEDICINE

## 2024-09-24 PROCEDURE — 1157F ADVNC CARE PLAN IN RCRD: CPT | Performed by: FAMILY MEDICINE

## 2024-09-24 PROCEDURE — 1159F MED LIST DOCD IN RCRD: CPT | Performed by: FAMILY MEDICINE

## 2024-09-24 PROCEDURE — 3079F DIAST BP 80-89 MM HG: CPT | Performed by: FAMILY MEDICINE

## 2024-09-24 RX ORDER — GLIPIZIDE 2.5 MG/1
TABLET ORAL
Qty: 300 TABLET | Refills: 1 | Status: SHIPPED | OUTPATIENT
Start: 2024-09-24

## 2024-09-24 RX ORDER — BLOOD-GLUCOSE SENSOR
EACH MISCELLANEOUS
Qty: 10 EACH | Refills: 3 | Status: SHIPPED | OUTPATIENT
Start: 2024-09-24

## 2024-09-24 RX ORDER — BLOOD-GLUCOSE,RECEIVER,CONT
EACH MISCELLANEOUS
Qty: 1 EACH | Refills: 0 | Status: SHIPPED | OUTPATIENT
Start: 2024-09-24

## 2024-09-24 NOTE — PROGRESS NOTES
"Subjective   Patient ID: Alma Lane is a 74 y.o. female who presents for Discuss Sugar Dropping (Pt in today to discuss medications / hypoglycemia ).    Review of Systems  Denies N/V/D/C, no HA/S/V, denies rashes/lesions, no CP/SOB. Denies fevers/chills.  All other systems were negative.     Objective   /82 (BP Location: Left arm, Patient Position: Sitting)   Pulse 72   Ht 1.575 m (5' 2\")   Wt 68.5 kg (151 lb)   LMP  (LMP Unknown)   SpO2 96%   BMI 27.62 kg/m²     Physical Exam  Gen: NAD  eyes: EOMI, PERRLA  ENT: hearing grossly intact, no nasal discharge  resp: CTABL, without R/R  heart: RRR without MRG  GI: abd: S/ND/NT, BS+  lymph: no axillary, cervical, supraclavicular lymphadenopathy noted   MS: gait grossly WNL,  derm: no rashes or lesions noted  neuro: CN II-XII grossly intact  psych: A&Ox3    Assessment/Plan   Problem List Items Addressed This Visit             ICD-10-CM    Type 2 diabetes mellitus without complication, without long-term current use of insulin (Multi) E11.9    Relevant Medications    glipiZIDE 2.5 mg tablet    Dexcom G7 Sensor device    Dexcom G7  misc     Other Visit Diagnoses         Codes    Hypoglycemic event in diabetes (Multi)    -  Primary E11.649    Relevant Medications    Dexcom G7 Sensor device    Dexcom G7  misc                Patient Discussion/Summary     -  Type 2 diabetes with hypoglycemia, A1c is 6.9, 7.0, 7.3, 6.5, 7.2, 7.7, 7.0, 6.8, 6.4, 6.6, 6.7, 6.4, 5.8, 5.3, 6.1.  Patient is noting hypoglycemic events, shakiness, associated with a very low sugar reading, for example 44 or so. Is getting these sometimes twice a week.  We did add Actos back into patient's drug list a few weeks ago. -->>  Will lower the glipizide to 2.5 mg up to 3 times daily and see how that does.  Will recheck in a few months as already scheduled.    Will also prescribe Dexcom G7.  Patient has been having hypoglycemic events, difficult to control sugar.  Needs Dexcom G7 to " help her do her activities of daily living better.    Patient's daughter did have a type of thyroid cancer, not sure what type. Never started Januvia, it was not covered.  Unable to take acarbose due to kidney function.         Return in a few months as planned.

## 2024-09-24 NOTE — PATIENT INSTRUCTIONS
-  Type 2 diabetes with hypoglycemia, A1c is 6.9, 7.0, 7.3, 6.5, 7.2, 7.7, 7.0, 6.8, 6.4, 6.6, 6.7, 6.4, 5.8, 5.3, 6.1.  Patient is noting hypoglycemic events, shakiness, associated with a very low sugar reading, for example 44 or so. Is getting these sometimes twice a week.  We did add Actos back into patient's drug list a few weeks ago. -->>  Will lower the glipizide to 2.5 mg up to 3 times daily and see how that does.  Will recheck in a few months as already scheduled.    Will also prescribe Dexcom G7.  Patient has been having hypoglycemic events, difficult to control sugar.  Needs Dexcom G7 to help her do her activities of daily living better.    Patient's daughter did have a type of thyroid cancer, not sure what type. Never started Januvia, it was not covered.  Unable to take acarbose due to kidney function.         Return in a few months as planned.

## 2024-09-27 DIAGNOSIS — E11.649: ICD-10-CM

## 2024-09-27 DIAGNOSIS — E11.9 TYPE 2 DIABETES MELLITUS WITHOUT COMPLICATION, WITHOUT LONG-TERM CURRENT USE OF INSULIN (MULTI): ICD-10-CM

## 2024-09-27 RX ORDER — FLASH GLUCOSE SCANNING READER
EACH MISCELLANEOUS
Qty: 1 EACH | Refills: 0 | Status: SHIPPED | OUTPATIENT
Start: 2024-09-27 | End: 2024-09-30

## 2024-09-27 RX ORDER — FLASH GLUCOSE SENSOR
KIT MISCELLANEOUS
Qty: 6 EACH | Refills: 3 | Status: SHIPPED | OUTPATIENT
Start: 2024-09-27

## 2024-10-01 RX ORDER — FLASH GLUCOSE SCANNING READER
EACH MISCELLANEOUS
Qty: 1 EACH | Refills: 0 | Status: SHIPPED | OUTPATIENT
Start: 2024-10-01

## 2024-10-01 NOTE — PROGRESS NOTES
History of Present Illness:  Alma Lane is a 74 y.o. female presenting to clinic with complaints of chronic bilateral knee pain for about the last 6 months.  She recalls no specific injury.  Describes the knees as being generally sore and achy with occasional sharp pain.  She has been taking Tylenol with mild improvement.  Patient does have stage III CKD and high blood pressure, unable to take NSAIDs.    Imaging:  X-rays bilateral knee: No acute fractures or dislocations.  Shows mild to moderate bilateral arthritis, mostly patellofemoral.     Assessment:   Bilateral knee arthritis flare    Plan:  We reviewed the role of imaging, physical therapy, injections and the time frame to healing and correlation with outcome.  Medrol Dosepak  Ice: 60 minutes on and off  Knee brace  Exercise home program: Medically directed knee therapy / Handout given  Follow-up in 4 weeks.      Physical Exam:  Well-nourished, well-developed. No acute distress. Alert and oriented x3. Responds appropriately to questioning. Good mood. Normal affect.  Physical Exam  Bilateral Knee:  ROM: 0 to 110 degrees of flexion bilaterally.  Bilateral knees tenderness to palpation of the medial and lateral joint line.  Positive César´s test/Positive Apley Grind  Neurovascular exam normal distally  Palpable pedal pulse and good cap refill     Review of Systems:  GENERAL: Negative for malaise, significant weight loss, fever  MUSCULOSKELETAL: See HPI  NEURO:  Negative     Past Medical History:   Diagnosis Date    Breast cancer (Multi)     Chronic obstructive pulmonary disease with (acute) exacerbation (Multi) 04/15/2022    Acute exacerbation of chronic obstructive pulmonary disease (COPD)    Hx antineoplastic chemo     Personal history of diseases of the blood and blood-forming organs and certain disorders involving the immune mechanism     History of anemia    Personal history of irradiation     Personal history of other diseases of the  musculoskeletal system and connective tissue     History of arthritis    Personal history of other diseases of the respiratory system     History of chronic obstructive lung disease    Personal history of other diseases of the respiratory system     History of upper respiratory infection    Personal history of other diseases of the respiratory system     History of acute pharyngitis    Personal history of other diseases of the respiratory system 10/25/2016    History of acute pharyngitis    Personal history of other diseases of the respiratory system     History of pulmonary emphysema    Personal history of other endocrine, nutritional and metabolic disease     History of hyperlipidemia    Personal history of other endocrine, nutritional and metabolic disease     History of diabetes mellitus    Personal history of other specified conditions     History of chest pain    Shortness of breath     Shortness of breath at rest       Medication Documentation Review Audit       Reviewed by Kamlesh Bui CMA (Medical Assistant) on 09/24/24 at 1407      Medication Order Taking? Sig Documenting Provider Last Dose Status   aspirin 81 mg chewable tablet [id number] No Chew 1 tablet (81 mg) once daily. Historical Provider, MD Taking Active   biotin 5,000 mcg tablet,disintegrating [id number] No Take 1 tablet by mouth once daily. Historical Provider, MD Taking Active   cholecalciferol (Vitamin D-3) 125 MCG (5000 UT) capsule [id number] No Take 1 capsule (125 mcg) by mouth once daily. Historical Provider, MD Taking Active   cyanocobalamin (Vitamin B-12) 100 mcg tablet [id number] No Take 1 tablet (100 mcg) by mouth once daily. Historical Provider, MD Taking Active   glipiZIDE (Glucotrol) 5 mg tablet [id number] No 1 by mouth up to 3 times daily as needed and directed. Yogesh Garrett DO Taking Active   losartan (Cozaar) 50 mg tablet [id number] No TAKE ONE TABLET BY MOUTH TWO TIMES A DAY Donte Parson,  Taking Active    methylPREDNISolone (Medrol Dospak) 4 mg tablets [id number]  Use as directed by package instructions Asa Cabral MD  Active   metoprolol tartrate (Lopressor) 50 mg tablet [id number]  TAKE ONE TABLET BY MOUTH TWO TIMES A DAY Donte Parson DO  Active   pantoprazole (ProtoNix) 20 mg EC tablet [id number]  TAKE ONE TABLET BY MOUTH EVERY MORNING BEFORE A MEAL. DO NOT CRUSH, CHEW, OR SPLIT. Yogesh Garrett DO  Active   pioglitazone (Actos) 15 mg tablet [id number]  Take 1 tablet (15 mg) by mouth once daily. Yogesh Garrett DO  Active   rosuvastatin (Crestor) 20 mg tablet [id number]  TAKE ONE TABLET BY MOUTH EVERY DAY Josh Waterman MD  Active                    Allergies   Allergen Reactions    Atorvastatin Hives and Other    Ondansetron Hives    Simvastatin Other       Social History     Socioeconomic History    Marital status:      Spouse name: Not on file    Number of children: Not on file    Years of education: Not on file    Highest education level: Not on file   Occupational History    Not on file   Tobacco Use    Smoking status: Every Day     Current packs/day: 0.50     Average packs/day: 0.5 packs/day for 55.7 years (27.9 ttl pk-yrs)     Types: Cigarettes     Start date: 1969    Smokeless tobacco: Never   Substance and Sexual Activity    Alcohol use: Not Currently    Drug use: Not Currently    Sexual activity: Not on file   Other Topics Concern    Not on file   Social History Narrative    Not on file     Social Determinants of Health     Financial Resource Strain: Not on file   Food Insecurity: Not on file   Transportation Needs: Not on file   Physical Activity: Not on file   Stress: Not on file   Social Connections: Not on file   Intimate Partner Violence: Not on file   Housing Stability: Not on file       Past Surgical History:   Procedure Laterality Date    APPENDECTOMY  10/25/2016    Appendectomy    BREAST LUMPECTOMY      BREAST SURGERY  10/25/2016    Breast Surgery    COLONOSCOPY   10/25/2016    Complete Colonoscopy    CT ANGIO NECK  10/04/2021    CT NECK ANGIO W AND WO IV CONTRAST 10/4/2021 [facility]    MOUTH SURGERY  10/25/2016    Oral Surgery Tooth Extraction    OTHER SURGICAL HISTORY  01/21/2022    Ganglion cyst excision       No results found.       Scribe Attestation  By signing my name below, IRadha Scribe   attest that this documentation has been prepared under the direction and in the presence of Asa Cabral MD.

## 2024-10-04 ENCOUNTER — APPOINTMENT (OUTPATIENT)
Dept: ORTHOPEDIC SURGERY | Facility: CLINIC | Age: 74
End: 2024-10-04
Payer: MEDICARE

## 2024-10-30 ENCOUNTER — INFUSION (OUTPATIENT)
Dept: HEMATOLOGY/ONCOLOGY | Facility: CLINIC | Age: 74
End: 2024-10-30
Payer: MEDICARE

## 2024-10-30 VITALS
TEMPERATURE: 97.2 F | RESPIRATION RATE: 20 BRPM | WEIGHT: 154 LBS | BODY MASS INDEX: 28.34 KG/M2 | DIASTOLIC BLOOD PRESSURE: 66 MMHG | HEART RATE: 84 BPM | SYSTOLIC BLOOD PRESSURE: 159 MMHG | HEIGHT: 62 IN | OXYGEN SATURATION: 99 %

## 2024-10-30 DIAGNOSIS — Z45.2 ENCOUNTER FOR CARE RELATED TO VASCULAR ACCESS PORT: ICD-10-CM

## 2024-10-30 PROCEDURE — 2500000004 HC RX 250 GENERAL PHARMACY W/ HCPCS (ALT 636 FOR OP/ED): Performed by: INTERNAL MEDICINE

## 2024-10-30 PROCEDURE — 96523 IRRIG DRUG DELIVERY DEVICE: CPT

## 2024-10-30 RX ORDER — HEPARIN 100 UNIT/ML
5 SYRINGE INTRAVENOUS ONCE
Status: COMPLETED | OUTPATIENT
Start: 2024-10-30 | End: 2024-10-30

## 2024-10-30 ASSESSMENT — PAIN SCALES - GENERAL: PAINLEVEL_OUTOF10: 0-NO PAIN

## 2024-10-31 ENCOUNTER — APPOINTMENT (OUTPATIENT)
Dept: CARDIOLOGY | Facility: CLINIC | Age: 74
End: 2024-10-31
Payer: MEDICARE

## 2024-10-31 VITALS
HEART RATE: 78 BPM | DIASTOLIC BLOOD PRESSURE: 64 MMHG | WEIGHT: 151 LBS | SYSTOLIC BLOOD PRESSURE: 132 MMHG | BODY MASS INDEX: 27.79 KG/M2 | HEIGHT: 62 IN

## 2024-10-31 DIAGNOSIS — R06.09 DYSPNEA ON EXERTION: ICD-10-CM

## 2024-10-31 DIAGNOSIS — I34.0 MITRAL VALVE INSUFFICIENCY, UNSPECIFIED ETIOLOGY: ICD-10-CM

## 2024-10-31 DIAGNOSIS — I65.29 STENOSIS OF CAROTID ARTERY, UNSPECIFIED LATERALITY: ICD-10-CM

## 2024-10-31 DIAGNOSIS — R01.1 MURMUR, CARDIAC: ICD-10-CM

## 2024-10-31 DIAGNOSIS — I70.0 ATHEROSCLEROSIS OF AORTA (CMS-HCC): ICD-10-CM

## 2024-10-31 DIAGNOSIS — I35.0 AORTIC STENOSIS, MODERATE: ICD-10-CM

## 2024-10-31 DIAGNOSIS — E11.22 TYPE 2 DIABETES MELLITUS WITH STAGE 4 CHRONIC KIDNEY DISEASE, WITHOUT LONG-TERM CURRENT USE OF INSULIN (MULTI): ICD-10-CM

## 2024-10-31 DIAGNOSIS — E78.2 MIXED HYPERLIPIDEMIA: ICD-10-CM

## 2024-10-31 DIAGNOSIS — N18.4 TYPE 2 DIABETES MELLITUS WITH STAGE 4 CHRONIC KIDNEY DISEASE, WITHOUT LONG-TERM CURRENT USE OF INSULIN (MULTI): ICD-10-CM

## 2024-10-31 DIAGNOSIS — I10 BENIGN ESSENTIAL HYPERTENSION: ICD-10-CM

## 2024-10-31 PROCEDURE — 1159F MED LIST DOCD IN RCRD: CPT | Performed by: INTERNAL MEDICINE

## 2024-10-31 PROCEDURE — 3075F SYST BP GE 130 - 139MM HG: CPT | Performed by: INTERNAL MEDICINE

## 2024-10-31 PROCEDURE — 99214 OFFICE O/P EST MOD 30 MIN: CPT | Performed by: INTERNAL MEDICINE

## 2024-10-31 PROCEDURE — 3078F DIAST BP <80 MM HG: CPT | Performed by: INTERNAL MEDICINE

## 2024-10-31 PROCEDURE — 4004F PT TOBACCO SCREEN RCVD TLK: CPT | Performed by: INTERNAL MEDICINE

## 2024-10-31 PROCEDURE — 1157F ADVNC CARE PLAN IN RCRD: CPT | Performed by: INTERNAL MEDICINE

## 2024-11-20 ENCOUNTER — TELEPHONE (OUTPATIENT)
Dept: PRIMARY CARE | Facility: CLINIC | Age: 74
End: 2024-11-20
Payer: MEDICARE

## 2024-11-29 ENCOUNTER — HOSPITAL ENCOUNTER (OUTPATIENT)
Dept: CARDIOLOGY | Facility: CLINIC | Age: 74
Discharge: HOME | End: 2024-11-29
Payer: MEDICARE

## 2024-11-29 DIAGNOSIS — R06.09 DYSPNEA ON EXERTION: ICD-10-CM

## 2024-11-29 DIAGNOSIS — I35.0 AORTIC STENOSIS, MODERATE: ICD-10-CM

## 2024-11-29 LAB
AORTIC VALVE MEAN GRADIENT: 37 MMHG
AORTIC VALVE PEAK VELOCITY: 3.87 M/S
AV PEAK GRADIENT: 60 MMHG
AVA (PEAK VEL): 0.79 CM2
AVA (VTI): 0.69 CM2
EJECTION FRACTION APICAL 4 CHAMBER: 63.1
EJECTION FRACTION: 63 %
LEFT VENTRICLE INTERNAL DIMENSION DIASTOLE: 4.2 CM (ref 3.5–6)
LEFT VENTRICULAR OUTFLOW TRACT DIAMETER: 1.8 CM
LV EJECTION FRACTION BIPLANE: 66 %
MITRAL VALVE E/A RATIO: 0.66
MITRAL VALVE E/E' RATIO: 10.4
RIGHT VENTRICLE PEAK SYSTOLIC PRESSURE: 29.2 MMHG

## 2024-11-29 PROCEDURE — 93306 TTE W/DOPPLER COMPLETE: CPT

## 2024-11-29 PROCEDURE — 93306 TTE W/DOPPLER COMPLETE: CPT | Performed by: INTERNAL MEDICINE

## 2024-12-09 ENCOUNTER — LAB (OUTPATIENT)
Dept: LAB | Facility: LAB | Age: 74
End: 2024-12-09
Payer: MEDICARE

## 2024-12-09 DIAGNOSIS — I12.9 HYPERTENSIVE CHRONIC KIDNEY DISEASE WITH STAGE 1 THROUGH STAGE 4 CHRONIC KIDNEY DISEASE, OR UNSPECIFIED CHRONIC KIDNEY DISEASE: ICD-10-CM

## 2024-12-09 DIAGNOSIS — E55.9 VITAMIN D DEFICIENCY, UNSPECIFIED: Primary | ICD-10-CM

## 2024-12-09 DIAGNOSIS — E11.65 TYPE 2 DIABETES MELLITUS WITH HYPERGLYCEMIA (MULTI): ICD-10-CM

## 2024-12-09 DIAGNOSIS — E11.22 TYPE 2 DIABETES MELLITUS WITH DIABETIC CHRONIC KIDNEY DISEASE: ICD-10-CM

## 2024-12-09 DIAGNOSIS — N18.32 CHRONIC KIDNEY DISEASE, STAGE 3B (MULTI): ICD-10-CM

## 2024-12-09 DIAGNOSIS — R79.9 ABNORMAL FINDING OF BLOOD CHEMISTRY, UNSPECIFIED: ICD-10-CM

## 2024-12-09 DIAGNOSIS — E78.2 MIXED HYPERLIPIDEMIA: ICD-10-CM

## 2024-12-09 DIAGNOSIS — Z13.89 ENCOUNTER FOR SCREENING FOR OTHER DISORDER: ICD-10-CM

## 2024-12-09 DIAGNOSIS — R53.82 CHRONIC FATIGUE, UNSPECIFIED: ICD-10-CM

## 2024-12-09 LAB
25(OH)D3 SERPL-MCNC: 85 NG/ML (ref 30–100)
ALBUMIN SERPL BCP-MCNC: 3.8 G/DL (ref 3.4–5)
ALP SERPL-CCNC: 53 U/L (ref 33–136)
ALT SERPL W P-5'-P-CCNC: 9 U/L (ref 7–45)
ANION GAP SERPL CALC-SCNC: 11 MMOL/L (ref 10–20)
AST SERPL W P-5'-P-CCNC: 13 U/L (ref 9–39)
BASOPHILS # BLD AUTO: 0.04 X10*3/UL (ref 0–0.1)
BASOPHILS NFR BLD AUTO: 0.5 %
BILIRUB SERPL-MCNC: 0.3 MG/DL (ref 0–1.2)
BUN SERPL-MCNC: 26 MG/DL (ref 6–23)
CALCIUM SERPL-MCNC: 9.4 MG/DL (ref 8.6–10.3)
CHLORIDE SERPL-SCNC: 103 MMOL/L (ref 98–107)
CO2 SERPL-SCNC: 28 MMOL/L (ref 21–32)
CREAT SERPL-MCNC: 1.6 MG/DL (ref 0.5–1.05)
CREAT UR-MCNC: 74.4 MG/DL (ref 20–320)
EGFRCR SERPLBLD CKD-EPI 2021: 33 ML/MIN/1.73M*2
EOSINOPHIL # BLD AUTO: 0.15 X10*3/UL (ref 0–0.4)
EOSINOPHIL NFR BLD AUTO: 2 %
ERYTHROCYTE [DISTWIDTH] IN BLOOD BY AUTOMATED COUNT: 14.3 % (ref 11.5–14.5)
EST. AVERAGE GLUCOSE BLD GHB EST-MCNC: 154 MG/DL
GLUCOSE SERPL-MCNC: 180 MG/DL (ref 74–99)
HBA1C MFR BLD: 7 %
HCT VFR BLD AUTO: 36.7 % (ref 36–46)
HGB BLD-MCNC: 11.7 G/DL (ref 12–16)
IMM GRANULOCYTES # BLD AUTO: 0.03 X10*3/UL (ref 0–0.5)
IMM GRANULOCYTES NFR BLD AUTO: 0.4 % (ref 0–0.9)
LDLC SERPL DIRECT ASSAY-MCNC: 57 MG/DL (ref 0–129)
LYMPHOCYTES # BLD AUTO: 3.31 X10*3/UL (ref 0.8–3)
LYMPHOCYTES NFR BLD AUTO: 44.3 %
MCH RBC QN AUTO: 31.2 PG (ref 26–34)
MCHC RBC AUTO-ENTMCNC: 31.9 G/DL (ref 32–36)
MCV RBC AUTO: 98 FL (ref 80–100)
MICROALBUMIN UR-MCNC: <7 MG/L
MICROALBUMIN/CREAT UR: NORMAL MG/G{CREAT}
MONOCYTES # BLD AUTO: 0.73 X10*3/UL (ref 0.05–0.8)
MONOCYTES NFR BLD AUTO: 9.8 %
NEUTROPHILS # BLD AUTO: 3.22 X10*3/UL (ref 1.6–5.5)
NEUTROPHILS NFR BLD AUTO: 43 %
NRBC BLD-RTO: 0 /100 WBCS (ref 0–0)
PHOSPHATE SERPL-MCNC: 3.5 MG/DL (ref 2.5–4.9)
PLATELET # BLD AUTO: 225 X10*3/UL (ref 150–450)
POTASSIUM SERPL-SCNC: 5 MMOL/L (ref 3.5–5.3)
PROT SERPL-MCNC: 6.8 G/DL (ref 6.4–8.2)
PTH-INTACT SERPL-MCNC: 39.6 PG/ML (ref 18.5–88)
RBC # BLD AUTO: 3.75 X10*6/UL (ref 4–5.2)
SODIUM SERPL-SCNC: 137 MMOL/L (ref 136–145)
TSH SERPL-ACNC: 2.65 MIU/L (ref 0.44–3.98)
WBC # BLD AUTO: 7.5 X10*3/UL (ref 4.4–11.3)

## 2024-12-09 PROCEDURE — 82570 ASSAY OF URINE CREATININE: CPT

## 2024-12-09 PROCEDURE — 82043 UR ALBUMIN QUANTITATIVE: CPT

## 2024-12-09 PROCEDURE — 83970 ASSAY OF PARATHORMONE: CPT

## 2024-12-09 PROCEDURE — 83036 HEMOGLOBIN GLYCOSYLATED A1C: CPT

## 2024-12-09 PROCEDURE — 83721 ASSAY OF BLOOD LIPOPROTEIN: CPT

## 2024-12-09 PROCEDURE — 82306 VITAMIN D 25 HYDROXY: CPT

## 2024-12-09 PROCEDURE — 80053 COMPREHEN METABOLIC PANEL: CPT

## 2024-12-09 PROCEDURE — 85025 COMPLETE CBC W/AUTO DIFF WBC: CPT

## 2024-12-09 PROCEDURE — 84100 ASSAY OF PHOSPHORUS: CPT

## 2024-12-09 PROCEDURE — 84443 ASSAY THYROID STIM HORMONE: CPT

## 2024-12-18 ENCOUNTER — OFFICE VISIT (OUTPATIENT)
Dept: PRIMARY CARE | Facility: CLINIC | Age: 74
End: 2024-12-18
Payer: MEDICARE

## 2024-12-18 VITALS
SYSTOLIC BLOOD PRESSURE: 152 MMHG | OXYGEN SATURATION: 96 % | HEART RATE: 77 BPM | DIASTOLIC BLOOD PRESSURE: 73 MMHG | BODY MASS INDEX: 27.42 KG/M2 | HEIGHT: 62 IN | WEIGHT: 149 LBS

## 2024-12-18 DIAGNOSIS — D64.9 ANEMIA, UNSPECIFIED TYPE: ICD-10-CM

## 2024-12-18 DIAGNOSIS — R73.09 OTHER ABNORMAL GLUCOSE: ICD-10-CM

## 2024-12-18 DIAGNOSIS — K21.9 GASTROESOPHAGEAL REFLUX DISEASE, UNSPECIFIED WHETHER ESOPHAGITIS PRESENT: ICD-10-CM

## 2024-12-18 DIAGNOSIS — J44.9 CHRONIC OBSTRUCTIVE PULMONARY DISEASE, UNSPECIFIED COPD TYPE (MULTI): ICD-10-CM

## 2024-12-18 DIAGNOSIS — E16.2 HYPOGLYCEMIA: Primary | ICD-10-CM

## 2024-12-18 DIAGNOSIS — N28.9 RENAL INSUFFICIENCY: ICD-10-CM

## 2024-12-18 DIAGNOSIS — I70.0 ATHEROSCLEROSIS OF AORTA (CMS-HCC): ICD-10-CM

## 2024-12-18 DIAGNOSIS — C50.111 MALIGNANT NEOPLASM OF CENTRAL PORTION OF RIGHT FEMALE BREAST, UNSPECIFIED ESTROGEN RECEPTOR STATUS: ICD-10-CM

## 2024-12-18 DIAGNOSIS — Z13.9 SCREENING FOR CONDITION: ICD-10-CM

## 2024-12-18 DIAGNOSIS — Z72.0 CURRENT NICOTINE USE: ICD-10-CM

## 2024-12-18 DIAGNOSIS — E11.649: ICD-10-CM

## 2024-12-18 DIAGNOSIS — E55.9 VITAMIN D DEFICIENCY: ICD-10-CM

## 2024-12-18 DIAGNOSIS — E11.9 TYPE 2 DIABETES MELLITUS WITHOUT COMPLICATION, WITHOUT LONG-TERM CURRENT USE OF INSULIN (MULTI): ICD-10-CM

## 2024-12-18 DIAGNOSIS — Z79.899 DRUG THERAPY: ICD-10-CM

## 2024-12-18 DIAGNOSIS — E78.2 MIXED HYPERLIPIDEMIA: ICD-10-CM

## 2024-12-18 LAB — POC HEMOGLOBIN A1C: 7 % (ref 4.2–6.5)

## 2024-12-18 PROCEDURE — 1123F ACP DISCUSS/DSCN MKR DOCD: CPT | Performed by: FAMILY MEDICINE

## 2024-12-18 PROCEDURE — 1159F MED LIST DOCD IN RCRD: CPT | Performed by: FAMILY MEDICINE

## 2024-12-18 PROCEDURE — 83036 HEMOGLOBIN GLYCOSYLATED A1C: CPT | Performed by: FAMILY MEDICINE

## 2024-12-18 PROCEDURE — 99214 OFFICE O/P EST MOD 30 MIN: CPT | Performed by: FAMILY MEDICINE

## 2024-12-18 PROCEDURE — 1158F ADVNC CARE PLAN TLK DOCD: CPT | Performed by: FAMILY MEDICINE

## 2024-12-18 PROCEDURE — 1157F ADVNC CARE PLAN IN RCRD: CPT | Performed by: FAMILY MEDICINE

## 2024-12-18 PROCEDURE — 3078F DIAST BP <80 MM HG: CPT | Performed by: FAMILY MEDICINE

## 2024-12-18 PROCEDURE — G2211 COMPLEX E/M VISIT ADD ON: HCPCS | Performed by: FAMILY MEDICINE

## 2024-12-18 PROCEDURE — 4004F PT TOBACCO SCREEN RCVD TLK: CPT | Performed by: FAMILY MEDICINE

## 2024-12-18 PROCEDURE — 3077F SYST BP >= 140 MM HG: CPT | Performed by: FAMILY MEDICINE

## 2024-12-18 RX ORDER — INSULIN PUMP SYRINGE, 3 ML
EACH MISCELLANEOUS
Qty: 1 EACH | Refills: 0 | Status: SHIPPED | OUTPATIENT
Start: 2024-12-18 | End: 2025-12-18

## 2024-12-18 RX ORDER — IBUPROFEN 200 MG
CAPSULE ORAL
Qty: 600 STRIP | Refills: 3 | Status: SHIPPED | OUTPATIENT
Start: 2024-12-18

## 2024-12-18 RX ORDER — LANCETS
EACH MISCELLANEOUS
Qty: 600 EACH | Refills: 3 | Status: SHIPPED | OUTPATIENT
Start: 2024-12-18

## 2024-12-18 RX ORDER — PANTOPRAZOLE SODIUM 20 MG/1
TABLET, DELAYED RELEASE ORAL
Qty: 100 TABLET | Refills: 4 | Status: SHIPPED | OUTPATIENT
Start: 2024-12-18

## 2024-12-18 NOTE — PATIENT INSTRUCTIONS
[place] [facility]      ----  - Negative screen for hepatitis C August 2017.  - Screening for colon cancer, patient had colonoscopy around 2019, told to come back in 10 years for next, so that will be around 2029.  - Breast cancer history, seeing Humberto Tee and Aleisha. -->> f/u as planned.     Need for immunizations. Up-to-date on coronavirus series including latest boosters. Also up-to-date on annual flu shot. Up-to-date on both pneumonia shots as well as the new RSV immunization.  is also due for tetanus shot as well as the new shingles series. -->>  Check with your pharmacy to stay up-to-date on your immunizations.  ---------------------     Overweight, BMI 27, the same as last visit here.  Weight is pretty stable over the last several years. Patient does note she is eating a little better.  Most recently, patient has been less active due to knee pain. -->> Continue to limit simple carbohydrates like bread, pasta, potatoes, rice, sugars in general.     Smoking about 1/2 ppd. Improved from a pack or more daily -->> keep up the excellent work!  Of course I do advise you to completely quit.     Cardiac CT calcium scoring result was a zero, excellent. Pt does have h/o carotid artery stenosis, coronary artery disease/atherosclerosis, holosystolic murmur. -->> Follow-up with cardiology/Dr. Parson.      New A1c reviewed:     -  Type 2 diabetes with hypoglycemia, A1c is 6.9, 7.0, 7.3, 6.5, 7.2, 7.7, 7.0, 6.8, 6.4, 6.6, 6.7, 6.4, 5.8, 5.3, 6.1.  Was getting hypoglycemic episodes so cardiology referred patient to endocrinology/Dr. Boston.  They discontinued glipizide and sounds like they increase the Actos to 30 mg daily. -->>  Follow-up with endocrinology as planned.  Am trying to order glucometer to hopefully be covered on patient's insurance.  Any further refills you will need to get from endocrinology.      Regarding previous labs:    - History of hypochromic anemia, iron deficiency.   Anemia resolved at this time.  Percent saturation 16%.  B12 and folic acid within normal limits.  Patient is eating more servings of iron rich foods. -->>  Keep up the good work.  Will recheck with next annual labs.    - Renal insufficiency/CKD estimated GFR 34, was 40, 26, 32, 37,  34, 39, 35, 35, 34, 36, . Seeing Dr. Garcia. -->> f/u with nephrology as planned. Looks like patient is getting labs with nephrology every 4 months. We will try to synchronize our labs around december with nephrology labs.    - Hyperlipidemia, LDL 18, on rosuvastatin/Crestor 20 mg daily along with coenzyme Q 10 over-the-counter. -->> Continue current treatment. Will recheck with next annual labs.    - Vitamin D deficiency, 68, was 63, 38, 37, 57, 54, 60. Goal is .  This this lab was done patient added an extra dose of vitamin D on Mondays and Fridays.  -->>  Continue current dosing.  Rechecking with next annual labs summer 2025.      Hypertension, blood pressure good today. In general numbers pretty good on chart. -->> Follow-up with cardiology as planned.      COPD, history of AECOPD end of 2021, but not since in general doing well, not needing the albuterol more than a couple times a year.    Anxiety and depression. Patient does well without meds, with the assistance of her cat. The cat helps her perform activities of daily living without getting as stressed. Patient is definitely calmer and less stressed with cat around. -->> [facility] will [facility] stating patient has a support animal. Or could write a letter if needed.     GERD. Doing well since she restarted the pantoprazole. -->> continue pantoprazole 20 mg daily.         - Patient will return in about 6 months for annual lab review and Medicare wellness and annual preventative visit.    - Patient will go to [facility] about a week before that appointment to get fasting annual labs anemia panel minus A1c that we ordered today.

## 2024-12-18 NOTE — PROGRESS NOTES
"Subjective   Patient ID: Alma Lane is a 74 y.o. female who presents for Routine FU / IO A1c (Pt in today for routine FU / IO A1c).    Review of Systems  Denies N/V/D/C, no HA/S/V, denies rashes/lesions, no CP/SOB. Denies fevers/chills.  All other systems were negative.     Objective   /73 (BP Location: Left arm, Patient Position: Sitting, BP Cuff Size: Adult)   Pulse 77   Ht 1.575 m (5' 2\")   Wt 67.6 kg (149 lb)   SpO2 96%   BMI 27.25 kg/m²     Physical Exam  Gen: NAD  eyes: EOMI, PERRLA  ENT: hearing grossly intact, no nasal discharge  resp: CTABL, without R/R  heart: RRR, Holosystolic murmur  GI: abd: S/ND/NT, BS+  lymph: no axillary, cervical, supraclavicular lymphadenopathy noted   MS: gait grossly WNL,  derm: no rashes or lesions noted  neuro: CN II-XII grossly intact  psych: A&Ox3    Assessment/Plan   Problem List Items Addressed This Visit             ICD-10-CM    Type 2 diabetes mellitus without complication, without long-term current use of insulin (Multi) E11.9    Relevant Orders    POCT glycosylated hemoglobin (Hb A1C) manually resulted (Completed)           Patient Discussion/Summary     Patient Discussion/Summary     2025 will be next annual preventative with [facility].      ----  - Negative screen for hepatitis C August 2017.  - Screening for colon cancer, patient had colonoscopy around 2019, told to come back in 10 years for next, so that will be around 2029.  - Breast cancer history, seeing Humberto Tee and Aleisha. -->> f/u as planned.     Need for immunizations. Up-to-date on coronavirus series including latest boosters. Also up-to-date on annual flu shot. Up-to-date on both pneumonia shots as well as the new RSV immunization.  is also due for tetanus shot as well as the new shingles series. -->>  Check with your pharmacy to stay up-to-date on your immunizations.  ---------------------     Overweight, BMI 27, the same as last visit here.  Weight is pretty stable over the last several " years. Patient does note she is eating a little better.  Most recently, patient has been less active due to knee pain. -->> Continue to limit simple carbohydrates like bread, pasta, potatoes, rice, sugars in general.     Smoking about 1/2 ppd. Improved from a pack or more daily -->> keep up the excellent work!  Of course I do advise you to completely quit.     Cardiac CT calcium scoring result was a zero, excellent. Pt does have h/o carotid artery stenosis, coronary artery disease/atherosclerosis, holosystolic murmur. -->> Follow-up with cardiology/Dr. Parson.      New A1c reviewed:     -  Type 2 diabetes with hypoglycemia, A1c is 6.9, 7.0, 7.3, 6.5, 7.2, 7.7, 7.0, 6.8, 6.4, 6.6, 6.7, 6.4, 5.8, 5.3, 6.1.  Was getting hypoglycemic episodes so cardiology referred patient to endocrinology/Dr. Boston.  They discontinued glipizide and sounds like they increase the Actos to 30 mg daily. -->>  Follow-up with endocrinology as planned.  Am trying to order glucometer to hopefully be covered on patient's insurance.  Any further refills you will need to get from endocrinology.      Regarding previous labs:    - History of hypochromic anemia, iron deficiency.  Anemia resolved at this time.  Percent saturation 16%.  B12 and folic acid within normal limits.  Patient is eating more servings of iron rich foods. -->>  Keep up the good work.  Will recheck with next annual labs.    - Renal insufficiency/CKD estimated GFR 34, was 40, 26, 32, 37,  34, 39, 35, 35, 34, 36, . Seeing Dr. Garcia. -->> f/u with nephrology as planned. Looks like patient is getting labs with nephrology every 4 months. We will try to synchronize our labs around december with nephrology labs.    - Hyperlipidemia, LDL 18, on rosuvastatin/Crestor 20 mg daily along with coenzyme Q 10 over-the-counter. -->> Continue current treatment. Will recheck with next annual labs.    - Vitamin D deficiency, 68, was 63, 38, 37, 57, 54, 60. Goal is .  This this lab was done  patient added an extra dose of vitamin D on Mondays and Fridays.  -->>  Continue current dosing.  Rechecking with next annual labs summer 2025.      Hypertension, blood pressure good today. In general numbers pretty good on chart. -->> Follow-up with cardiology as planned.      COPD, history of AECOPD end of 2021, but not since in general doing well, not needing the albuterol more than a couple times a year.    Anxiety and depression. Patient does well without meds, with the assistance of her cat. The cat helps her perform activities of daily living without getting as stressed. Patient is definitely calmer and less stressed with cat around. -->> [facility] has a support [facility]. Or could write a letter if needed.     GERD. Doing well since she restarted the pantoprazole. -->> continue pantoprazole 20 mg daily.         - Patient will return in about 6 months for annual lab review and Medicare wellness and annual preventative visit.    - Patient will go to [facility] about a week before that appointment to get fasting annual labs anemia panel minus A1c that we ordered today.

## 2024-12-19 ENCOUNTER — APPOINTMENT (OUTPATIENT)
Dept: PRIMARY CARE | Facility: CLINIC | Age: 74
End: 2024-12-19
Payer: MEDICARE

## 2024-12-26 ENCOUNTER — INFUSION (OUTPATIENT)
Dept: HEMATOLOGY/ONCOLOGY | Facility: CLINIC | Age: 74
End: 2024-12-26
Payer: MEDICARE

## 2024-12-26 VITALS
TEMPERATURE: 97.3 F | DIASTOLIC BLOOD PRESSURE: 64 MMHG | OXYGEN SATURATION: 96 % | RESPIRATION RATE: 20 BRPM | SYSTOLIC BLOOD PRESSURE: 141 MMHG | HEART RATE: 70 BPM

## 2024-12-26 DIAGNOSIS — Z45.2 ENCOUNTER FOR CARE RELATED TO VASCULAR ACCESS PORT: ICD-10-CM

## 2024-12-26 PROCEDURE — 2500000004 HC RX 250 GENERAL PHARMACY W/ HCPCS (ALT 636 FOR OP/ED)

## 2024-12-26 PROCEDURE — 96523 IRRIG DRUG DELIVERY DEVICE: CPT

## 2024-12-26 RX ORDER — HEPARIN 100 UNIT/ML
5 SYRINGE INTRAVENOUS ONCE
Status: COMPLETED | OUTPATIENT
Start: 2024-12-26 | End: 2024-12-26

## 2024-12-26 ASSESSMENT — PAIN SCALES - GENERAL: PAINLEVEL_OUTOF10: 0-NO PAIN

## 2024-12-26 NOTE — PROGRESS NOTES
Port needle flushed with 20 ml NS and 5 ml heparin lock flush (100 units/ml).  Needle removed intact.  No bleeding noted.  Dressing applied.  Patient discharged in stable condition.

## 2025-01-07 ENCOUNTER — LAB (OUTPATIENT)
Dept: LAB | Facility: LAB | Age: 75
End: 2025-01-07
Payer: MEDICARE

## 2025-01-07 DIAGNOSIS — N18.32 CHRONIC KIDNEY DISEASE, STAGE 3B (MULTI): Primary | ICD-10-CM

## 2025-01-07 DIAGNOSIS — E11.22 TYPE 2 DIABETES MELLITUS WITH DIABETIC CHRONIC KIDNEY DISEASE: ICD-10-CM

## 2025-01-07 DIAGNOSIS — Z13.89 ENCOUNTER FOR SCREENING FOR OTHER DISORDER: ICD-10-CM

## 2025-01-07 DIAGNOSIS — R79.9 ABNORMAL FINDING OF BLOOD CHEMISTRY, UNSPECIFIED: ICD-10-CM

## 2025-01-07 DIAGNOSIS — I12.9 HYPERTENSIVE CHRONIC KIDNEY DISEASE WITH STAGE 1 THROUGH STAGE 4 CHRONIC KIDNEY DISEASE, OR UNSPECIFIED CHRONIC KIDNEY DISEASE: ICD-10-CM

## 2025-01-07 LAB
ANION GAP SERPL CALC-SCNC: 7 MMOL/L (ref 10–20)
BASOPHILS # BLD AUTO: 0.03 X10*3/UL (ref 0–0.1)
BASOPHILS NFR BLD AUTO: 0.3 %
BUN SERPL-MCNC: 29 MG/DL (ref 6–23)
CALCIUM SERPL-MCNC: 9.4 MG/DL (ref 8.6–10.3)
CHLORIDE SERPL-SCNC: 105 MMOL/L (ref 98–107)
CO2 SERPL-SCNC: 29 MMOL/L (ref 21–32)
CREAT SERPL-MCNC: 1.96 MG/DL (ref 0.5–1.05)
EGFRCR SERPLBLD CKD-EPI 2021: 26 ML/MIN/1.73M*2
EOSINOPHIL # BLD AUTO: 0.13 X10*3/UL (ref 0–0.4)
EOSINOPHIL NFR BLD AUTO: 1.4 %
ERYTHROCYTE [DISTWIDTH] IN BLOOD BY AUTOMATED COUNT: 14.6 % (ref 11.5–14.5)
EST. AVERAGE GLUCOSE BLD GHB EST-MCNC: 157 MG/DL
GLUCOSE SERPL-MCNC: 173 MG/DL (ref 74–99)
HBA1C MFR BLD: 7.1 %
HCT VFR BLD AUTO: 37.9 % (ref 36–46)
HGB BLD-MCNC: 11.6 G/DL (ref 12–16)
IMM GRANULOCYTES # BLD AUTO: 0.02 X10*3/UL (ref 0–0.5)
IMM GRANULOCYTES NFR BLD AUTO: 0.2 % (ref 0–0.9)
LYMPHOCYTES # BLD AUTO: 3.82 X10*3/UL (ref 0.8–3)
LYMPHOCYTES NFR BLD AUTO: 40.7 %
MCH RBC QN AUTO: 30.8 PG (ref 26–34)
MCHC RBC AUTO-ENTMCNC: 30.6 G/DL (ref 32–36)
MCV RBC AUTO: 101 FL (ref 80–100)
MONOCYTES # BLD AUTO: 0.83 X10*3/UL (ref 0.05–0.8)
MONOCYTES NFR BLD AUTO: 8.8 %
NEUTROPHILS # BLD AUTO: 4.56 X10*3/UL (ref 1.6–5.5)
NEUTROPHILS NFR BLD AUTO: 48.6 %
NRBC BLD-RTO: 0 /100 WBCS (ref 0–0)
PHOSPHATE SERPL-MCNC: 3.4 MG/DL (ref 2.5–4.9)
PLATELET # BLD AUTO: 226 X10*3/UL (ref 150–450)
POTASSIUM SERPL-SCNC: 5.3 MMOL/L (ref 3.5–5.3)
PTH-INTACT SERPL-MCNC: 37.8 PG/ML (ref 18.5–88)
RBC # BLD AUTO: 3.77 X10*6/UL (ref 4–5.2)
SODIUM SERPL-SCNC: 136 MMOL/L (ref 136–145)
WBC # BLD AUTO: 9.4 X10*3/UL (ref 4.4–11.3)

## 2025-01-07 PROCEDURE — 80048 BASIC METABOLIC PNL TOTAL CA: CPT

## 2025-01-07 PROCEDURE — 85025 COMPLETE CBC W/AUTO DIFF WBC: CPT

## 2025-01-07 PROCEDURE — 83036 HEMOGLOBIN GLYCOSYLATED A1C: CPT

## 2025-01-07 PROCEDURE — 83970 ASSAY OF PARATHORMONE: CPT

## 2025-01-07 PROCEDURE — 84100 ASSAY OF PHOSPHORUS: CPT

## 2025-02-20 ENCOUNTER — APPOINTMENT (OUTPATIENT)
Dept: HEMATOLOGY/ONCOLOGY | Facility: CLINIC | Age: 75
End: 2025-02-20
Payer: MEDICARE

## 2025-02-25 ENCOUNTER — INFUSION (OUTPATIENT)
Dept: HEMATOLOGY/ONCOLOGY | Facility: CLINIC | Age: 75
End: 2025-02-25
Payer: MEDICARE

## 2025-02-25 VITALS
OXYGEN SATURATION: 100 % | RESPIRATION RATE: 20 BRPM | DIASTOLIC BLOOD PRESSURE: 55 MMHG | HEART RATE: 58 BPM | SYSTOLIC BLOOD PRESSURE: 125 MMHG | TEMPERATURE: 97.3 F

## 2025-02-25 DIAGNOSIS — Z45.2 ENCOUNTER FOR CARE RELATED TO VASCULAR ACCESS PORT: ICD-10-CM

## 2025-02-25 PROCEDURE — 96523 IRRIG DRUG DELIVERY DEVICE: CPT

## 2025-02-25 PROCEDURE — 2500000004 HC RX 250 GENERAL PHARMACY W/ HCPCS (ALT 636 FOR OP/ED)

## 2025-02-25 RX ORDER — HEPARIN 100 UNIT/ML
5 SYRINGE INTRAVENOUS AS NEEDED
Status: DISCONTINUED | OUTPATIENT
Start: 2025-02-25 | End: 2025-02-25 | Stop reason: HOSPADM

## 2025-02-25 RX ADMIN — HEPARIN 500 UNITS: 100 SYRINGE at 11:40

## 2025-02-25 ASSESSMENT — PAIN SCALES - GENERAL: PAINLEVEL_OUTOF10: 0-NO PAIN

## 2025-02-25 NOTE — PROGRESS NOTES
1145:  Port needle flushed with 20 ml NS and 5 ml heparin lock flush (100 units/ml).  Needle removed intact.  No bleeding noted.  Dressing applied.  To return in 8 wweks.  Patient discharged in stable condition.

## 2025-03-22 ENCOUNTER — APPOINTMENT (OUTPATIENT)
Dept: RADIOLOGY | Facility: HOSPITAL | Age: 75
End: 2025-03-22
Payer: MEDICARE

## 2025-03-22 ENCOUNTER — HOSPITAL ENCOUNTER (EMERGENCY)
Facility: HOSPITAL | Age: 75
Discharge: HOME | End: 2025-03-22
Payer: MEDICARE

## 2025-03-22 VITALS
RESPIRATION RATE: 18 BRPM | OXYGEN SATURATION: 96 % | TEMPERATURE: 97.5 F | DIASTOLIC BLOOD PRESSURE: 51 MMHG | HEART RATE: 52 BPM | BODY MASS INDEX: 27.42 KG/M2 | WEIGHT: 149 LBS | SYSTOLIC BLOOD PRESSURE: 160 MMHG | HEIGHT: 62 IN

## 2025-03-22 DIAGNOSIS — M25.511 ACUTE PAIN OF RIGHT SHOULDER: ICD-10-CM
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
## 2025-03-22 DIAGNOSIS — M25.562 ACUTE PAIN OF LEFT KNEE: ICD-10-CM

## 2025-03-22 LAB
ANION GAP SERPL CALC-SCNC: 11 MMOL/L (ref 10–20)
BASOPHILS # BLD AUTO: 0.04 X10*3/UL (ref 0–0.1)
BASOPHILS NFR BLD AUTO: 0.6 %
BUN SERPL-MCNC: 22 MG/DL (ref 6–23)
CALCIUM SERPL-MCNC: 9.2 MG/DL (ref 8.6–10.3)
CHLORIDE SERPL-SCNC: 106 MMOL/L (ref 98–107)
CO2 SERPL-SCNC: 26 MMOL/L (ref 21–32)
CREAT SERPL-MCNC: 1.43 MG/DL (ref 0.5–1.05)
EGFRCR SERPLBLD CKD-EPI 2021: 38 ML/MIN/1.73M*2
EOSINOPHIL # BLD AUTO: 0.14 X10*3/UL (ref 0–0.4)
EOSINOPHIL NFR BLD AUTO: 2 %
ERYTHROCYTE [DISTWIDTH] IN BLOOD BY AUTOMATED COUNT: 14.5 % (ref 11.5–14.5)
GLUCOSE SERPL-MCNC: 138 MG/DL (ref 74–99)
HCT VFR BLD AUTO: 34.5 % (ref 36–46)
HGB BLD-MCNC: 11.2 G/DL (ref 12–16)
IMM GRANULOCYTES # BLD AUTO: 0.02 X10*3/UL (ref 0–0.5)
IMM GRANULOCYTES NFR BLD AUTO: 0.3 % (ref 0–0.9)
LYMPHOCYTES # BLD AUTO: 2.16 X10*3/UL (ref 0.8–3)
LYMPHOCYTES NFR BLD AUTO: 30.3 %
MCH RBC QN AUTO: 31.5 PG (ref 26–34)
MCHC RBC AUTO-ENTMCNC: 32.5 G/DL (ref 32–36)
MCV RBC AUTO: 97 FL (ref 80–100)
MONOCYTES # BLD AUTO: 0.66 X10*3/UL (ref 0.05–0.8)
MONOCYTES NFR BLD AUTO: 9.2 %
NEUTROPHILS # BLD AUTO: 4.12 X10*3/UL (ref 1.6–5.5)
NEUTROPHILS NFR BLD AUTO: 57.6 %
NRBC BLD-RTO: 0 /100 WBCS (ref 0–0)
PLATELET # BLD AUTO: 229 X10*3/UL (ref 150–450)
POTASSIUM SERPL-SCNC: 4.7 MMOL/L (ref 3.5–5.3)
RBC # BLD AUTO: 3.55 X10*6/UL (ref 4–5.2)
SODIUM SERPL-SCNC: 138 MMOL/L (ref 136–145)
WBC # BLD AUTO: 7.1 X10*3/UL (ref 4.4–11.3)

## 2025-03-22 PROCEDURE — 96374 THER/PROPH/DIAG INJ IV PUSH: CPT

## 2025-03-22 PROCEDURE — 73590 X-RAY EXAM OF LOWER LEG: CPT | Mod: LT

## 2025-03-22 PROCEDURE — 96361 HYDRATE IV INFUSION ADD-ON: CPT

## 2025-03-22 PROCEDURE — 73564 X-RAY EXAM KNEE 4 OR MORE: CPT | Mod: LT

## 2025-03-22 PROCEDURE — 80048 BASIC METABOLIC PNL TOTAL CA: CPT | Performed by: PHYSICIAN ASSISTANT

## 2025-03-22 PROCEDURE — 85025 COMPLETE CBC W/AUTO DIFF WBC: CPT | Performed by: PHYSICIAN ASSISTANT

## 2025-03-22 PROCEDURE — 73030 X-RAY EXAM OF SHOULDER: CPT | Mod: RIGHT SIDE | Performed by: RADIOLOGY

## 2025-03-22 PROCEDURE — 99284 EMERGENCY DEPT VISIT MOD MDM: CPT | Mod: 25

## 2025-03-22 PROCEDURE — 73030 X-RAY EXAM OF SHOULDER: CPT | Mod: RT

## 2025-03-22 PROCEDURE — 2500000004 HC RX 250 GENERAL PHARMACY W/ HCPCS (ALT 636 FOR OP/ED): Performed by: PHYSICIAN ASSISTANT

## 2025-03-22 RX ORDER — ACETAMINOPHEN 500 MG
1000 TABLET ORAL EVERY 6 HOURS PRN
Qty: 30 TABLET | Refills: 0 | Status: SHIPPED | OUTPATIENT
Start: 2025-03-22 | End: 2025-04-01

## 2025-03-22 RX ORDER — MORPHINE SULFATE 4 MG/ML
4 INJECTION, SOLUTION INTRAMUSCULAR; INTRAVENOUS ONCE
Status: COMPLETED | OUTPATIENT
Start: 2025-03-22 | End: 2025-03-22

## 2025-03-22 RX ADMIN — MORPHINE SULFATE 4 MG: 4 INJECTION, SOLUTION INTRAMUSCULAR; INTRAVENOUS at 11:08

## 2025-03-22 RX ADMIN — SODIUM CHLORIDE 500 ML: 0.9 INJECTION, SOLUTION INTRAVENOUS at 11:07

## 2025-03-22 ASSESSMENT — COLUMBIA-SUICIDE SEVERITY RATING SCALE - C-SSRS
6. HAVE YOU EVER DONE ANYTHING, STARTED TO DO ANYTHING, OR PREPARED TO DO ANYTHING TO END YOUR LIFE?: NO
1. IN THE PAST MONTH, HAVE YOU WISHED YOU WERE DEAD OR WISHED YOU COULD GO TO SLEEP AND NOT WAKE UP?: NO
2. HAVE YOU ACTUALLY HAD ANY THOUGHTS OF KILLING YOURSELF?: NO

## 2025-03-22 ASSESSMENT — LIFESTYLE VARIABLES
EVER HAD A DRINK FIRST THING IN THE MORNING TO STEADY YOUR NERVES TO GET RID OF A HANGOVER: NO
HAVE PEOPLE ANNOYED YOU BY CRITICIZING YOUR DRINKING: NO
TOTAL SCORE: 0
HAVE YOU EVER FELT YOU SHOULD CUT DOWN ON YOUR DRINKING: NO
EVER FELT BAD OR GUILTY ABOUT YOUR DRINKING: NO

## 2025-03-22 ASSESSMENT — PAIN SCALES - GENERAL
PAINLEVEL_OUTOF10: 8
PAINLEVEL_OUTOF10: 8

## 2025-03-22 ASSESSMENT — PAIN DESCRIPTION - PROGRESSION: CLINICAL_PROGRESSION: NOT CHANGED

## 2025-03-22 ASSESSMENT — PAIN DESCRIPTION - LOCATION
LOCATION: KNEE
LOCATION: KNEE

## 2025-03-22 ASSESSMENT — PAIN DESCRIPTION - ORIENTATION
ORIENTATION: LEFT
ORIENTATION: LEFT

## 2025-03-22 ASSESSMENT — PAIN - FUNCTIONAL ASSESSMENT: PAIN_FUNCTIONAL_ASSESSMENT: 0-10

## 2025-03-22 ASSESSMENT — PAIN DESCRIPTION - PAIN TYPE: TYPE: ACUTE PAIN

## 2025-03-22 NOTE — ED PROVIDER NOTES
HPI   Chief Complaint   Patient presents with    Fall     Pt fell stepping down from a curb. Pt complains of right shoulder and left knee pain. Pt did not hit head, no LOC, no thinners.       A 75-year-old female patient comes in the emergency department today by EMS secondary to a fall that happened yesterday.  States she was coming out at the facility when she tripped on the curb causing her to fall hitting her left knee and injuring her right shoulder.  She denies hitting her head or loss consciousness.  Denies any blood thinner use.  States her knee pain is more significant rates it a 8.5 out of 10 on the pain scale.  States he is also having pain in her right shoulder prickly posterior aspect.  For this purpose comes in the emergency department today for the evaluation.  Otherwise no complaints present time.              Patient History   Past Medical History:   Diagnosis Date    Breast cancer (Multi)     Chronic obstructive pulmonary disease with (acute) exacerbation (Multi) 04/15/2022    Acute exacerbation of chronic obstructive pulmonary disease (COPD)    Diabetes mellitus (Multi)     Hx antineoplastic chemo     Hypertension     Personal history of diseases of the blood and blood-forming organs and certain disorders involving the immune mechanism     History of anemia    Personal history of irradiation     Personal history of other diseases of the musculoskeletal system and connective tissue     History of arthritis    Personal history of other diseases of the respiratory system     History of chronic obstructive lung disease    Personal history of other diseases of the respiratory system     History of upper respiratory infection    Personal history of other diseases of the respiratory system     History of acute pharyngitis    Personal history of other diseases of the respiratory system 10/25/2016    History of acute pharyngitis    Personal history of other diseases of the respiratory system     History of  pulmonary emphysema    Personal history of other endocrine, nutritional and metabolic disease     History of hyperlipidemia    Personal history of other endocrine, nutritional and metabolic disease     History of diabetes mellitus    Personal history of other specified conditions     History of chest pain    Shortness of breath     Shortness of breath at rest     Past Surgical History:   Procedure Laterality Date    APPENDECTOMY  10/25/2016    Appendectomy    BREAST LUMPECTOMY      BREAST SURGERY  10/25/2016    Breast Surgery    COLONOSCOPY  10/25/2016    Complete Colonoscopy    CT ANGIO NECK  10/04/2021    CT NECK ANGIO W AND WO IV CONTRAST 10/4/2021 [facility]    MOUTH SURGERY  10/25/2016    Oral Surgery Tooth Extraction    OTHER SURGICAL HISTORY  01/21/2022    Ganglion cyst excision     Family History   Problem Relation Name Age of Onset    No Known Problems Mother      Cancer Father      Diabetes Father       Social History     Tobacco Use    Smoking status: Every Day     Current packs/day: 0.50     Average packs/day: 0.5 packs/day for 56.2 years (28.1 ttl pk-yrs)     Types: Cigarettes     Start date: 1969    Smokeless tobacco: Never   Substance Use Topics    Alcohol use: Not Currently    Drug use: Not Currently       Physical Exam   ED Triage Vitals   Temp Pulse Resp BP   -- -- -- --      SpO2 Temp src Heart Rate Source Patient Position   -- -- -- --      BP Location FiO2 (%)     -- --       Physical Exam  Constitutional:       General: She is in acute distress.      Appearance: Normal appearance. She is not ill-appearing or diaphoretic.   HENT:      Head: Normocephalic and atraumatic.      Nose: Nose normal.   Eyes:      Extraocular Movements: Extraocular movements intact.      Conjunctiva/sclera: Conjunctivae normal.      Pupils: Pupils are equal, round, and reactive to light.   Cardiovascular:      Rate and Rhythm: Normal rate and regular rhythm.   Pulmonary:      Effort: Pulmonary effort is  normal. No respiratory distress.      Breath sounds: Normal breath sounds. No stridor. No wheezing.   Musculoskeletal:         General: Tenderness (Anterior left knee particular the patella and just inferior to over the joint line. Pain continues with palpation until mid tib-fib) present. Normal range of motion.      Cervical back: Normal range of motion.      Comments: Pain to palpation over the right posterior shoulder   Skin:     General: Skin is warm and dry.   Neurological:      General: No focal deficit present.      Mental Status: She is alert and oriented to person, place, and time. Mental status is at baseline.   Psychiatric:         Mood and Affect: Mood normal.           ED Course & MDM   Diagnoses as of 03/22/25 1209   Accidental fall, initial encounter   Acute pain of left knee   Acute pain of right shoulder                 No data recorded                                 Medical Decision Making  A 75-year-old female patient comes in the emergency department today by EMS secondary to a fall that happened yesterday.  States she was coming out at the facility when she tripped on the curb causing her to fall hitting her left knee and injuring her right shoulder.  She denies hitting her head or loss consciousness.  Denies any blood thinner use.  States her knee pain is more significant rates it a 8.5 out of 10 on the pain scale.  States he is also having pain in her right shoulder prickly posterior aspect.  For this purpose comes in the emergency department today for the evaluation.  Otherwise no complaints present time.    X-ray left knee, right shoulder, left tib-fib ordered rule out any acute bony abnormalities, fractures.  Basic laboratory studies ordered to rule out Kristy abnormality, acute kidney injury or leukocytosis.  Some IV fluids ordered for the patient as patient is concerned about an elevated blood sugar.  Insulin naïve.    Patient's blood glucose 138 creatinine 1.4 GFR 38 which is improved  for the patient.  No leukocytosis or left shift.  X-ray of the left knee is negative other than some suprapatellar effusion and some degenerative changes tib-fib same and right shoulder x-ray shows no fracture or dislocation as well.  Patient does have some degenerative changes and possible chronic rotator cuff tear.  Will have patient's left knee wrapped with Ace wrap discharge patient home medications for discomfort.  Patient agrees with this plan expressed verbal understanding.  All questions were answered.  Offered patient follow-up with orthopedics patient declines.    Historian is the patient    Diagnosis: Accidental fall, musculoskeletal pain    The correct placement of the splint/strap was confirmed.  Any necessary adjustments were made.  The patient´s neurovascular status is intact pre and post placement.      I personally supervised and inspected the splint/strap which was applied by   ancillary staff     . The extremity is appropriately immobilized. Patient neurovascularly intact before and after the splint application.        Labs Reviewed   CBC WITH AUTO DIFFERENTIAL - Abnormal       Result Value    WBC 7.1      nRBC 0.0      RBC 3.55 (*)     Hemoglobin 11.2 (*)     Hematocrit 34.5 (*)     MCV 97      MCH 31.5      MCHC 32.5      RDW 14.5      Platelets 229      Neutrophils % 57.6      Immature Granulocytes %, Automated 0.3      Lymphocytes % 30.3      Monocytes % 9.2      Eosinophils % 2.0      Basophils % 0.6      Neutrophils Absolute 4.12      Immature Granulocytes Absolute, Automated 0.02      Lymphocytes Absolute 2.16      Monocytes Absolute 0.66      Eosinophils Absolute 0.14      Basophils Absolute 0.04     BASIC METABOLIC PANEL - Abnormal    Glucose 138 (*)     Sodium 138      Potassium 4.7      Chloride 106      Bicarbonate 26      Anion Gap 11      Urea Nitrogen 22      Creatinine 1.43 (*)     eGFR 38 (*)     Calcium 9.2          XR knee left 4+ views   Final Result   No acute fracture or  dislocation.        Degenerative changes.        Trace suprapatellar effusion.             MACRO:   None        Signed by: Lety Burch 3/22/2025 11:48 AM   Dictation workstation:   [id number]      XR tibia fibula left 2 views   Final Result   No acute fracture or dislocation.        Degenerative changes.        Trace suprapatellar effusion.             MACRO:   None        Signed by: Lety Burch 3/22/2025 11:48 AM   Dictation workstation:   [id number]      XR shoulder right 2+ views   Final Result   No acute fracture or dislocation.        Degenerative changes at the acromioclavicular joint.        Superior location of the humeral head, which may be related to   chronic rotator cuff tear.             MACRO:   None        Signed by: Lety Burch 3/22/2025 11:53 AM   Dictation workstation:   [id number]            Procedure  Procedures     Luis Cabrales PA-C  03/22/25 120

## 2025-04-22 ENCOUNTER — INFUSION (OUTPATIENT)
Dept: HEMATOLOGY/ONCOLOGY | Facility: CLINIC | Age: 75
End: 2025-04-22
Payer: MEDICARE

## 2025-04-22 VITALS
BODY MASS INDEX: 27.6 KG/M2 | DIASTOLIC BLOOD PRESSURE: 52 MMHG | HEIGHT: 62 IN | TEMPERATURE: 97.7 F | OXYGEN SATURATION: 98 % | RESPIRATION RATE: 20 BRPM | HEART RATE: 65 BPM | WEIGHT: 150 LBS | SYSTOLIC BLOOD PRESSURE: 125 MMHG

## 2025-04-22 DIAGNOSIS — Z45.2 ENCOUNTER FOR CARE RELATED TO VASCULAR ACCESS PORT: ICD-10-CM

## 2025-04-22 PROCEDURE — 2500000004 HC RX 250 GENERAL PHARMACY W/ HCPCS (ALT 636 FOR OP/ED): Mod: JZ

## 2025-04-22 PROCEDURE — 96523 IRRIG DRUG DELIVERY DEVICE: CPT

## 2025-04-22 RX ORDER — HEPARIN 100 UNIT/ML
5 SYRINGE INTRAVENOUS ONCE
Status: COMPLETED | OUTPATIENT
Start: 2025-04-22 | End: 2025-04-22

## 2025-04-22 RX ADMIN — HEPARIN 500 UNITS: 100 SYRINGE at 12:14

## 2025-04-22 ASSESSMENT — PAIN SCALES - GENERAL: PAINLEVEL_OUTOF10: 0-NO PAIN

## 2025-04-29 ENCOUNTER — TELEMEDICINE (OUTPATIENT)
Dept: PRIMARY CARE | Facility: CLINIC | Age: 75
End: 2025-04-29
Payer: MEDICARE

## 2025-04-29 DIAGNOSIS — M25.562 CHRONIC PAIN OF BOTH KNEES: ICD-10-CM

## 2025-04-29 DIAGNOSIS — G89.29 CHRONIC PAIN OF BOTH KNEES: ICD-10-CM

## 2025-04-29 DIAGNOSIS — M25.561 CHRONIC PAIN OF BOTH KNEES: ICD-10-CM

## 2025-04-29 DIAGNOSIS — J44.1 ACUTE EXACERBATION OF CHRONIC OBSTRUCTIVE PULMONARY DISEASE (COPD) (MULTI): Primary | ICD-10-CM

## 2025-04-29 PROCEDURE — 1157F ADVNC CARE PLAN IN RCRD: CPT | Performed by: FAMILY MEDICINE

## 2025-04-29 PROCEDURE — 99214 OFFICE O/P EST MOD 30 MIN: CPT | Performed by: FAMILY MEDICINE

## 2025-04-29 PROCEDURE — 1123F ACP DISCUSS/DSCN MKR DOCD: CPT | Performed by: FAMILY MEDICINE

## 2025-04-29 RX ORDER — DOXYCYCLINE 100 MG/1
CAPSULE ORAL
Qty: 20 CAPSULE | Refills: 0 | Status: SHIPPED | OUTPATIENT
Start: 2025-04-29

## 2025-04-29 RX ORDER — BENZONATATE 100 MG/1
100 CAPSULE ORAL 3 TIMES DAILY PRN
Qty: 42 CAPSULE | Refills: 1 | Status: SHIPPED | OUTPATIENT
Start: 2025-04-29

## 2025-04-29 RX ORDER — METHYLPREDNISOLONE 4 MG/1
TABLET ORAL
Qty: 21 TABLET | Refills: 0 | Status: SHIPPED | OUTPATIENT
Start: 2025-04-29

## 2025-04-29 NOTE — PROGRESS NOTES
"Subjective   Patient ID: Alma Lane is a 75 y.o. female who presents for Virtual Visit (Pt being seen virtually for c/o cough, congestion and cold symptoms).    Review of Systems  Denies N/V/D/C, no HA/S/V, denies rashes/lesions, no CP/SOB. Denies fevers/chills.  All other systems were negative.     Objective   There were no vitals taken for this visit.    Physical Exam  Gen: NAD  eyes: EOMI, PERRLA  ENT: hearing grossly intact, no nasal discharge  resp: CTABL, without R/R  heart: RRR without MRG  GI: abd: S/ND/NT, BS+  lymph: no axillary, cervical, supraclavicular lymphadenopathy noted   MS: gait grossly WNL,  derm: no rashes or lesions noted  neuro: CN II-XII grossly intact  psych: A&Ox3    Assessment/Plan   Problem List Items Addressed This Visit           ICD-10-CM    Chronic pain of both knees M25.561, M25.562, G89.29     Other Visit Diagnoses         Codes      Acute exacerbation of chronic obstructive pulmonary disease (COPD) (Multi)    -  Primary J44.1    Relevant Medications    doxycycline (Monodox) 100 mg capsule    methylPREDNISolone (Medrol Dospak) 4 mg tablets    benzonatate (Tessalon) 100 mg capsule              Former smoker, quit January 1, 2025.          Acute exacerbation of COPD, dry scratchy cough, comes and goes over the last 3 weeks, achy, feverish, feels \"terrible\".  Sometimes getting diarrhea with it. -->>  Will prescribe doxycycline for 10 days and a Medrol Dosepak well as Tessalon Perles.  If not improving or getting worse over the next several days or week, go to emergency room for further follow-up.      (COPD, history of AECOPD end of 2021,not needing the albuterol more than a couple times a year.)        Return for follow-up in 6 or 7 weeks as already scheduled.  "

## 2025-04-29 NOTE — PROGRESS NOTES
Subjective   Patient ID: Alma Lane is a 75 y.o. female who presents for Virtual Visit (Pt being seen virtually for c/o cough, congestion and cold symptoms).    HPI     Review of Systems    Objective   There were no vitals taken for this visit.    Physical Exam    Assessment/Plan

## 2025-04-29 NOTE — PATIENT INSTRUCTIONS
"Former smoker, quit January 1, 2025.          Acute exacerbation of COPD, dry scratchy cough, comes and goes over the last 3 weeks, achy, feverish, feels \"terrible\".  Sometimes getting diarrhea with it. -->>  Will prescribe doxycycline for 10 days and a Medrol Dosepak well as Tessalon Perles.  If not improving or getting worse over the next several days or week, go to emergency room for further follow-up.      (COPD, history of AECOPD end of 2021,not needing the albuterol more than a couple times a year.)        Return for follow-up in 6 or 7 weeks as already scheduled.  "

## 2025-05-01 ENCOUNTER — APPOINTMENT (OUTPATIENT)
Dept: CARDIOLOGY | Facility: CLINIC | Age: 75
End: 2025-05-01
Payer: MEDICARE

## 2025-05-01 DIAGNOSIS — I10 HYPERTENSION, UNSPECIFIED TYPE: ICD-10-CM

## 2025-05-01 RX ORDER — LOSARTAN POTASSIUM 50 MG/1
50 TABLET ORAL 2 TIMES DAILY
Qty: 180 TABLET | Refills: 3 | Status: SHIPPED | OUTPATIENT
Start: 2025-05-01 | End: 2026-05-01

## 2025-05-01 NOTE — TELEPHONE ENCOUNTER
Received request for prescription refills for patient.   Patient follows with Dr Parson    Request is for cozaar  Is patient currently on medication yes    Last OV 10/31/2024  Next OV 05/08/2025    Pended for signing and sent to provider

## 2025-05-08 ENCOUNTER — APPOINTMENT (OUTPATIENT)
Dept: CARDIOLOGY | Facility: CLINIC | Age: 75
End: 2025-05-08
Payer: MEDICARE

## 2025-05-08 VITALS
HEIGHT: 62 IN | SYSTOLIC BLOOD PRESSURE: 124 MMHG | HEART RATE: 72 BPM | DIASTOLIC BLOOD PRESSURE: 66 MMHG | WEIGHT: 163.2 LBS | BODY MASS INDEX: 30.03 KG/M2

## 2025-05-08 DIAGNOSIS — I10 PRIMARY HYPERTENSION: ICD-10-CM

## 2025-05-08 DIAGNOSIS — E78.2 MIXED HYPERLIPIDEMIA: ICD-10-CM

## 2025-05-08 DIAGNOSIS — I65.29 STENOSIS OF CAROTID ARTERY, UNSPECIFIED LATERALITY: ICD-10-CM

## 2025-05-08 DIAGNOSIS — R06.09 DYSPNEA ON EXERTION: ICD-10-CM

## 2025-05-08 DIAGNOSIS — R01.1 MURMUR, CARDIAC: ICD-10-CM

## 2025-05-08 DIAGNOSIS — I70.0 ATHEROSCLEROSIS OF AORTA (CMS-HCC): ICD-10-CM

## 2025-05-08 DIAGNOSIS — I34.0 MITRAL VALVE INSUFFICIENCY, UNSPECIFIED ETIOLOGY: ICD-10-CM

## 2025-05-08 DIAGNOSIS — Z87.891 FORMER SMOKER: ICD-10-CM

## 2025-05-08 DIAGNOSIS — I35.0 AORTIC STENOSIS, MODERATE: ICD-10-CM

## 2025-05-08 PROBLEM — F17.200 CURRENT EVERY DAY SMOKER: Status: RESOLVED | Noted: 2024-01-11 | Resolved: 2025-05-08

## 2025-05-08 PROBLEM — Z72.0 CURRENT NICOTINE USE: Status: RESOLVED | Noted: 2023-05-23 | Resolved: 2025-05-08

## 2025-05-08 PROCEDURE — 99215 OFFICE O/P EST HI 40 MIN: CPT | Performed by: INTERNAL MEDICINE

## 2025-05-08 PROCEDURE — 3078F DIAST BP <80 MM HG: CPT | Performed by: INTERNAL MEDICINE

## 2025-05-08 PROCEDURE — 3074F SYST BP LT 130 MM HG: CPT | Performed by: INTERNAL MEDICINE

## 2025-05-08 PROCEDURE — 1159F MED LIST DOCD IN RCRD: CPT | Performed by: INTERNAL MEDICINE

## 2025-05-08 NOTE — PROGRESS NOTES
Patient:  Alma Lane  YOB: 1950  MRN: [id number]     Chief complaint:   Chief Complaint   Patient presents with    Follow-up    Hyperlipidemia     6 mos        Chief Complaint/Active Symptoms:       Alma Lane is a 75 y.o. female who returns today for cardiac follow-up.  She is doing well.  She does note increased shortness of breath with activity.  Her echo did show significant aortic stenosis with valve area of approximately 0.7.  See my report for details.  Based on the above have asked Dr. Newman to perform heart cath because of aortic stenosis and symptoms to rule out any underlying coronary disease and see if she would be suitable for TAVR.  She is agreeable.  I did review this in great detail including all risk complication alternatives.          Objective:     Vitals:    05/08/25 1313   BP: 124/66   Pulse: 72       Vitals:    05/08/25 1313   Weight: 74 kg (163 lb 3.2 oz)       Allergies:     Allergies   Allergen Reactions    Atorvastatin Hives and Other    Ondansetron Hives    Simvastatin Other          Medications:     Current Outpatient Medications   Medication Instructions    aspirin 81 mg chewable tablet 1 tablet, Daily    benzonatate (TESSALON) 100 mg, oral, 3 times daily PRN, Do not crush or chew.    Blood glucose monitoring meter Used to check sugar up to 6 times a day as needed.    blood sugar diagnostic (Blood Glucose Test) strip Used to test sugars up to 6 times daily as needed.    cholecalciferol (VITAMIN D-3) 125 mcg, Daily    cyanocobalamin (VITAMIN B-12) 100 mcg, Daily    doxycycline (Monodox) 100 mg capsule 1 by mouth twice daily with food for 10 days.    lancets misc Use to test sugar up to 6 times daily as needed and directed    losartan (COZAAR) 50 mg, oral, 2 times daily    metoprolol tartrate (Lopressor) 50 mg tablet TAKE ONE TABLET BY MOUTH TWO TIMES A DAY    pantoprazole (ProtoNix) 20 mg EC tablet 1 po qd    pioglitazone (ACTOS) 15 mg, oral, Daily    rosuvastatin  "(CRESTOR) 20 mg, oral, Daily       Physical Examination:   Constitutional:       Appearance: Normal and healthy appearance. Well-developed, overweight and not in distress.   Neck:      Vascular: No JVR. JVD normal.   Pulmonary:      Effort: Pulmonary effort is normal.      Breath sounds: Normal breath sounds. No wheezing. No rhonchi. No rales.   Chest:      Chest wall: Not tender to palpatation.   Cardiovascular:      PMI at left midclavicular line. Normal rate. Regular rhythm. Normal S1. Normal S2.       Murmurs: There is a grade 3/6 systolic murmur at the URSB.      No gallop.  No click. No rub.   Pulses:     Intact distal pulses.   Edema:     Peripheral edema absent.   Abdominal:      Tenderness: There is no abdominal tenderness.   Musculoskeletal: Normal range of motion.         General: No tenderness. Skin:     General: Skin is warm and dry.   Neurological:      General: No focal deficit present.      Mental Status: Alert and oriented to person, place and time.            Lab:     CBC:   Lab Results   Component Value Date    WBC 7.1 03/22/2025    RBC 3.55 (L) 03/22/2025    HGB 11.2 (L) 03/22/2025    HCT 34.5 (L) 03/22/2025     03/22/2025        CMP:    Lab Results   Component Value Date     03/22/2025    K 4.7 03/22/2025     03/22/2025    CO2 26 03/22/2025    BUN 22 03/22/2025    CREATININE 1.43 (H) 03/22/2025    GLUCOSE 138 (H) 03/22/2025    CALCIUM 9.2 03/22/2025       Magnesium:    Lab Results   Component Value Date    MG 2.17 04/10/2024       Lipid Profile:    Lab Results   Component Value Date    TRIG 308 (H) 04/10/2024    HDL 27.6 04/10/2024    LDLCALC 18 04/10/2024    LDLDIRECT 57 12/09/2024       TSH:    Lab Results   Component Value Date    TSH 2.65 12/09/2024       BNP:   Lab Results   Component Value Date     (H) 09/06/2021        PT/INR:    No results found for: \"PROTIME\", \"INR\"    HgBA1c:    Lab Results   Component Value Date    HGBA1C 7.1 (H) 01/07/2025       BMP:  Lab " "Results   Component Value Date     03/22/2025     01/07/2025     12/09/2024    K 4.7 03/22/2025    K 5.3 01/07/2025    K 5.0 12/09/2024     03/22/2025     01/07/2025     12/09/2024    CO2 26 03/22/2025    CO2 29 01/07/2025    CO2 28 12/09/2024    BUN 22 03/22/2025    BUN 29 (H) 01/07/2025    BUN 26 (H) 12/09/2024    CREATININE 1.43 (H) 03/22/2025    CREATININE 1.96 (H) 01/07/2025    CREATININE 1.60 (H) 12/09/2024       CBC:  Lab Results   Component Value Date    WBC 7.1 03/22/2025    WBC 9.4 01/07/2025    WBC 7.5 12/09/2024    RBC 3.55 (L) 03/22/2025    RBC 3.77 (L) 01/07/2025    RBC 3.75 (L) 12/09/2024    HGB 11.2 (L) 03/22/2025    HGB 11.6 (L) 01/07/2025    HGB 11.7 (L) 12/09/2024    HCT 34.5 (L) 03/22/2025    HCT 37.9 01/07/2025    HCT 36.7 12/09/2024    MCV 97 03/22/2025     (H) 01/07/2025    MCV 98 12/09/2024    MCH 31.5 03/22/2025    MCH 30.8 01/07/2025    MCH 31.2 12/09/2024    MCHC 32.5 03/22/2025    MCHC 30.6 (L) 01/07/2025    MCHC 31.9 (L) 12/09/2024    RDW 14.5 03/22/2025    RDW 14.6 (H) 01/07/2025    RDW 14.3 12/09/2024     03/22/2025     01/07/2025     12/09/2024       Cardiac Enzymes:    No results found for: \"TROPHS\"    Hepatic Function Panel:    Lab Results   Component Value Date    ALKPHOS 53 12/09/2024    ALT 9 12/09/2024    AST 13 12/09/2024    PROT 6.8 12/09/2024    BILITOT 0.3 12/09/2024         Diagnostic Studies:     XR shoulder right 2+ views  Result Date: 3/22/2025  Interpreted By:  Lety Burch, STUDY: XR SHOULDER RIGHT 2+ VIEWS;  3/22/2025 10:44 am   INDICATION: Signs/Symptoms:Fall, pain.   COMPARISON: None.   ACCESSION NUMBER(S): [id number]   ORDERING CLINICIAN: ELOY HURLEY   FINDINGS: Hypertrophic bony changes are seen at the acromioclavicular joint, which narrow the joint. The glenohumeral joint is well preserved. There is no evidence of acute fracture or dislocation. Superior location of the humeral head is seen, which " may be seen with chronic rotator cuff tear per   No lytic or blastic lesions are seen. Left IJ catheter is noted with the distal tip projecting over the SVC.       No acute fracture or dislocation.   Degenerative changes at the acromioclavicular joint.   Superior location of the humeral head, which may be related to chronic rotator cuff tear.     MACRO: None   Signed by: Lety Burch 3/22/2025 11:53 AM Dictation workstation:   [id number]    XR knee left 4+ views  Result Date: 3/22/2025  Interpreted By:  Lety Burch, STUDY: XR KNEE LEFT 4+ VIEWS; XR TIBIA FIBULA LEFT 2 VIEWS;  3/22/2025 10:44 am   INDICATION: Signs/Symptoms:Fall, pain.   COMPARISON: 09/06/2024   ACCESSION NUMBER(S): [id number]; [id number]   ORDERING CLINICIAN: ELOY HURLEY   FINDINGS: Spurs are seen off of the distal femur and proximal tibia, including the tibial spines.  Spurs are also seen off of the patella. Narrowing of the medial and patellofemoral compartment is seen.   No acute fracture or dislocation is noted.   No periosteal reaction is seen.   There is trace suprapatellar effusion.   No definite fracture of the tibia or fibula       No acute fracture or dislocation.   Degenerative changes.   Trace suprapatellar effusion.     MACRO: None   Signed by: Lety Burch 3/22/2025 11:48 AM Dictation workstation:   [id number]    XR tibia fibula left 2 views  Result Date: 3/22/2025  Interpreted By:  Lety Burch, STUDY: XR KNEE LEFT 4+ VIEWS; XR TIBIA FIBULA LEFT 2 VIEWS;  3/22/2025 10:44 am   INDICATION: Signs/Symptoms:Fall, pain.   COMPARISON: 09/06/2024   ACCESSION NUMBER(S): [id number]; [id number]   ORDERING CLINICIAN: ELOY HURLEY   FINDINGS: Spurs are seen off of the distal femur and proximal tibia, including the tibial spines.  Spurs are also seen off of the patella. Narrowing of the medial and patellofemoral compartment is seen.   No acute fracture or dislocation is noted.   No periosteal reaction is seen.   There is trace  "suprapatellar effusion.   No definite fracture of the tibia or fibula       No acute fracture or dislocation.   Degenerative changes.   Trace suprapatellar effusion.     MACRO: None   Signed by: Lety Burch 3/22/2025 11:48 AM Dictation workstation:   [id number]      EKG:   No results found for: \"EKG\"      Radiology:     No orders to display                  [facility]  [address], [address], [place] [address]           Tel [phone] Fax [phone]     TRANSTHORACIC ECHOCARDIOGRAM REPORT     Patient Name:       DARVIN NGUYỄN LUIS Urbina Physician:    [id number] Donte Parson DO  Study Date:         11/29/2024          Ordering Provider:    [id number] DONTE PARSON  MRN/PID:            [id number]            Fellow:  Accession#:         [id number]        Nurse:  Date of Birth/Age:  1950 / 74 years Sonographer:          Donte Johnston  Gender Assigned at  F                   Additional Staff:  Birth:  Height:             157.48 cm           Admit Date:           11/29/2024  Weight:             68.49 kg            Admission Status:     Outpatient  BSA / BMI:          1.70 m2 / 27.62     Department Location:  [facility]                      kg/m2                                     [employer]  Blood Pressure: 130 /60 mmHg     Study Type:    TRANSTHORACIC ECHO (TTE) COMPLETE  Diagnosis/ICD: Other forms of dyspnea-R06.09; Nonrheumatic aortic (valve)                 stenosis-I35.0  Indication:    Dyspnea, AS  CPT Codes:     Echo Complete w Full Doppler-[id number]     Patient History:  Smoker:            Current.  Diabetes:          Yes  Pertinent History: COPD, Dyspnea, HTN, Hyperlipidemia, Murmur, Syncope and AS,                     MR.     Study Detail: The following Echo studies were performed: 2D, M-Mode, Doppler and                color flow. The " patient was awake.        PHYSICIAN INTERPRETATION:  Left Ventricle: The left ventricular systolic function is normal, with a visually estimated ejection fraction of 60-65%. There are no regional wall motion abnormalities. The left ventricular cavity size is normal. There is mild increased septal and mildly increased posterior left ventricular wall thickness. There is left ventricular concentric remodeling. Spectral Doppler shows a Grade I (impaired relaxation pattern) of left ventricular diastolic filling with normal left atrial filling pressure.  LV Wall Scoring:  All segments are normal.     Left Atrium: The left atrium is normal in size.  Right Ventricle: The right ventricle is normal in size. There is normal right ventricular global systolic function.  Right Atrium: The right atrium is normal in size.  Aortic Valve: The aortic valve appears abnormal. There is moderate aortic valve cusp calcification. There is evidence of moderate to severe aortic valve stenosis.  The aortic valve dimensionless index is 0.27. There is moderate aortic valve regurgitation. The peak instantaneous gradient of the aortic valve is 60 mmHg. The mean gradient of the aortic valve is 37 mmHg. Aortic Stenosis has progressed [id number] study.  Mitral Valve: The mitral valve is normal in structure. There is mild thickening and calcification of the anterior and posterior mitral valve leaflets. There is no evidence of mitral valve stenosis. The doppler estimated mean and peak diastolic pressure gradients are 2 mmHg and 10 mmHg respectively. There is normal mitral valve leaflet mobility. There is moderate mitral annular calcification. The peak instantaneous gradient of the mitral valve is 10 mmHg. There is mild mitral valve regurgitation.  Tricuspid Valve: The tricuspid valve is structurally normal. There is normal tricuspid valve leaflet mobility. There is mild tricuspid regurgitation.  Pulmonic Valve: The pulmonic valve is structurally normal.  There is no indication of pulmonic valve regurgitation.  Pericardium: No pericardial effusion noted.  Aorta: The aortic root is normal.  Pulmonary Artery: The main pulmonary artery is normal in size, and position, with normal bifurcation into the left and right pulmonary arteries. The tricuspid regurgitant velocity is 2.56 m/s, and with an estimated right atrial pressure of 3 mmHg, the estimated pulmonary artery pressure is mildly elevated with the RVSP at 29.2 mmHg.  Systemic Veins: The inferior vena cava appears normal in size.  In comparison to the previous echocardiogram(s): The left ventricular function is unchanged. The left ventricular diastolic function is unchanged.        CONCLUSIONS:   1. The left ventricular systolic function is normal, with a visually estimated ejection fraction of 60-65%.   2. No regional wall motion abnormalities.   3. Spectral Doppler shows a Grade I (impaired relaxation pattern) of left ventricular diastolic filling with normal left atrial filling pressure.   4. There is normal right ventricular global systolic function.   5. There is moderate mitral annular calcification.   6. There is no evidence of mitral valve stenosis.   7. Mild mitral valve regurgitation.   8. Mild tricuspid regurgitation is visualized.   9. Moderate to severe aortic valve stenosis.  10. There is moderate aortic valve cusp calcification.  11. Moderate aortic valve regurgitation.  12. The main pulmonary artery is normal in size, and position, with normal bifurcation into the left and right pulmonary arteries.     QUANTITATIVE DATA SUMMARY:     2D MEASUREMENTS:           Normal Ranges:  Ao Root d:       2.50 cm   (2.0-3.7cm)  LAs:             3.00 cm   (2.7-4.0cm)  RVIDd:           1.90 cm   (0.9-3.6cm)  IVSd:            1.10 cm   (0.6-1.1cm)  LVPWd:           1.10 cm   (0.6-1.1cm)  LVIDd:           4.20 cm   (3.9-5.9cm)  LVIDs:           3.00 cm  LV Mass Index:   92.6 g/m2  LV % FS          28.6 %        LA  VOLUME:                   Normal Ranges:  LA Area A4C:      15.1 cm2  LA Area A2C:      17.6 cm2  LA Volume Index:  27.6 ml/m2  LA Vol A4C:       40.0 ml  LA Vol A2C:       54.0 ml  LA Vol Index BSA: 27.7 ml/m2  LA Vol BP:        47.0 ml        AORTA MEASUREMENTS:         Normal Ranges:  Asc Ao, d:          2.60 cm (2.1-3.4cm)        LV SYSTOLIC FUNCTION BY 2D PLANIMETRY (MOD):                       Normal Ranges:  EF-A4C View:    63 % (>=55%)  EF-A2C View:    66 %  EF-Biplane:     66 %  EF-Visual:      63 %  LV EF Reported: 63 %        LV DIASTOLIC FUNCTION:             Normal Ranges:  MV Peak E:             0.87 m/s    (0.7-1.2 m/s)  MV Peak A:             1.32 m/s    (0.42-0.7 m/s)  E/A Ratio:             0.66        (1.0-2.2)  MV e'                  0.071 m/s   (>8.0)  MV lateral e'          0.08 m/s  MV medial e'           0.06 m/s  MV A Dur:              173.00 msec  E/e' Ratio:            12.23       (<8.0)  PulmV Sys Tay:         62.70 cm/s  PulmV Daniel Tay:        38.50 cm/s  PulmV S/D Tay:         1.60  PulmV A Revs Tay:      39.00 cm/s  PulmV A Revs Dur:      113.00 msec        MITRAL VALVE:          Normal Ranges:  MV Vmax:      1.56 m/s (<=1.3m/s)  MV peak P.7 mmHg (<5mmHg)  MV mean P.0 mmHg (<48mmHg)  MV DT:        232 msec (150-240msec)        AORTIC VALVE:                      Normal Ranges:  AoV Vmax:                3.87 m/s  (<=1.7m/s)  AoV Peak P.9 mmHg (<20mmHg)  AoV Mean P.0 mmHg (1.7-11.5mmHg)  LVOT Max Tay:            1.20 m/s  (<=1.1m/s)  AoV VTI:                 107.00 cm (18-25cm)  LVOT VTI:                29.10 cm  LVOT Diameter:           1.80 cm   (1.8-2.4cm)  AoV Area, VTI:           0.69 cm2  (2.5-5.5cm2)  AoV Area,Vmax:           0.79 cm2  (2.5-4.5cm2)  AoV Dimensionless Index: 0.27        AORTIC INSUFFICIENCY:  AI Vmax:       3.79 m/s  AI Half-time:  384 msec  AI Decel Rate: 306.00 cm/s2        TRICUSPID VALVE/RVSP:          Normal  Ranges:  Peak TR Velocity:     2.56 m/s  Est. RA Pressure:     3 mmHg  RV Syst Pressure:     29 mmHg  (< 30mmHg)        PULMONIC VALVE:           Normal Ranges:  PV Accel Time:  116 msec  (>120ms)  PV Max Tay:     1.6 m/s   (0.6-0.9m/s)  PV Max PG:      10.1 mmHg        Pulmonary Veins:  PulmV A Revs Dur: 113.00 msec  PulmV A Revs Tay: 39.00 cm/s  PulmV Daniel Tay:   38.50 cm/s  PulmV S/D Tay:    1.60  PulmV Sys Tay:    62.70 cm/s        [id number] Donte Parson DO  Electronically signed on 11/29/2024 at 3:25:58 PM        Wall Scoring         Interpreted By:  Donte Parson,  and Kwaku Kent   STUDY:  MYOCARDIAL PERFUSION STRESS TEST WITH LEXISCAN      Performing facility:  [facility],  [address] [place] [address] [id number] Provider:  Donte Parson DO, FACC  PCP:  Dr. LAVELL Garrett  Supervising provider:  Greta Alexander MD, FACC, FACP, RS      INDICATION:  SOB;      HISTORY:  Gender:  F; Age:  75 y/o ; Height:  .5 cm cm; Weight:   WT  66.225 kg kg.      High Cholesterol;  Diabetes;  HTN;  SOB;  Syncope;  COPD;  Breast CA  Currently smoking.          COMPARISON:  Previous nuclear testing completed on2017 at [facility].          ACCESSION NUMBER(S):  [id number]      ORDERING CLINICIAN:  DONTE PARSON      TECHNIQUE:  ONE DAY protocol.  Stress injection: Date:5-17-24, 34.3 mCi of Myoview IV 20 seconds  after rapid injection of Lexiscan. Rest injection: Date: 5-17-24,  11.2 mCi of Myoview IV at rest. The patient had a rapid injection of  0.4 mg of Lexiscan IV over 10 seconds. Imaging was performed by  gated tomographic technique. Reason for Lexiscan: Knee/ back pain      STRESS TEST DATA:  Resting heart rate was 62 BPM.  Resting blood pressure was 140/64 mmHg.  Peak blood pressure was 132/62 mmHg.  Peak heart rate was 85 BPM.  Aminophylline given 50mg IV.  TEST TERMINATED DUE TO:  Protocol completed      FINDINGS:  STRESS TEST RESULTS:      Resting electrocardiogram  revealed normal sinus rhythm, normal EKG.  There were no significant ischemic ECG changes or dysrhythmias.  The patient did not have chest abdominal pain occurred inpatient was  administered aminophylline with recovery. There was a normal recovery  phase.      IMAGING RESULTS:      Imaging results are good  No signs of myocardial ischemia  No signs of myocardial infarction  Normal wall motion  Ejection fraction 75%  No significant artifact      IMPRESSION:  Normal Lexiscan Myoview cardiac perfusion stress test.  No evidence of ischemia or myocardial infarction by perfusion imaging.  Normal left ventricular systolic function, ejection fraction 75%.  The above study is normal. It compares favorably to a study done  similarly dated 06/21/2017..      Signed by: Donte Parson 5/17/2024 11:27 AM  Dictation workstation:   [id number]  Assessment/Plan:     Patient Active Problem List   Diagnosis    Drug therapy    Type 2 diabetes mellitus without complication, without long-term current use of insulin    Vitamin D deficiency    Hyperlipidemia    Renal insufficiency    Hypertension    Anxiety and depression    Chronic obstructive pulmonary disease (Multi)    Encounter for care related to vascular access port    Aortic stenosis, moderate    Benign essential hypertension    Bilateral carotid bruits    Carotid artery stenosis    Chronic dyspnea    Dyspnea on exertion    Mitral valve insufficiency    Murmur, cardiac    Syncope    Atherosclerosis of aorta (CMS-HCC)    Malignant neoplasm of central portion of right female breast, unspecified estrogen receptor status    Type 2 diabetes mellitus with stage 4 chronic kidney disease, without long-term current use of insulin (Multi)    Stage 3 chronic kidney disease, unspecified whether stage 3a or 3b CKD (Multi)    Chronic pain of both knees    Body mass index (BMI) of 29.0 to 29.9 in adult    Former smoker         ASSESSMENT     Diagnoses and all orders for this visit:  Murmur,  cardiac  Mitral valve insufficiency, unspecified etiology  Primary hypertension  Mixed hyperlipidemia  Stenosis of carotid artery, unspecified laterality  Atherosclerosis of aorta (CMS-HCC)  Aortic stenosis, moderate  Body mass index (BMI) of 29.0 to 29.9 in adult  Former smoker  Dyspnea on exertion    75-year-old female here for routine follow-up.    Meds, vitals, examination as noted.    Chart review details cussed patient at length    Pression:  Moderate to severe or greater aortic stenosis  Mitral valve and tricuspid valve insufficiency  Carotid atherosclerosis  Presumed coronary disease  Former smoker  Dyspnea on exertion and increasing  Type 2 diabetes  Dyslipidemia  Hypertension  PLAN   Recommendation:  Proceed with left heart catheterization for valve and coronary evaluation  Continue current meds  Disposition to follow  Discussed with Dr. Newman will perform this next week  Patient to call should any other issues or problems otherwise develop        IAugusta  am scribing for, and in the presence of Dr. Parson .    I, Dr. Parson , personally performed the services described in the documentation as scribed by Augusta   in my presence, and confirm it is both accurate and complete.      Dr. Donte Parson, DO  Thank you for allowing me to participate in the care of this patient. Please do not hesitate to contact me with any further questions or concerns.

## 2025-05-08 NOTE — H&P (VIEW-ONLY)
Patient:  Alma Lane  YOB: 1950  MRN: [id number]     Chief complaint:   Chief Complaint   Patient presents with    Follow-up    Hyperlipidemia     6 mos        Chief Complaint/Active Symptoms:       Alma Lane is a 75 y.o. female who returns today for cardiac follow-up.  She is doing well.  She does note increased shortness of breath with activity.  Her echo did show significant aortic stenosis with valve area of approximately 0.7.  See my report for details.  Based on the above have asked Dr. Newman to perform heart cath because of aortic stenosis and symptoms to rule out any underlying coronary disease and see if she would be suitable for TAVR.  She is agreeable.  I did review this in great detail including all risk complication alternatives.          Objective:     Vitals:    05/08/25 1313   BP: 124/66   Pulse: 72       Vitals:    05/08/25 1313   Weight: 74 kg (163 lb 3.2 oz)       Allergies:     Allergies   Allergen Reactions    Atorvastatin Hives and Other    Ondansetron Hives    Simvastatin Other          Medications:     Current Outpatient Medications   Medication Instructions    aspirin 81 mg chewable tablet 1 tablet, Daily    benzonatate (TESSALON) 100 mg, oral, 3 times daily PRN, Do not crush or chew.    Blood glucose monitoring meter Used to check sugar up to 6 times a day as needed.    blood sugar diagnostic (Blood Glucose Test) strip Used to test sugars up to 6 times daily as needed.    cholecalciferol (VITAMIN D-3) 125 mcg, Daily    cyanocobalamin (VITAMIN B-12) 100 mcg, Daily    doxycycline (Monodox) 100 mg capsule 1 by mouth twice daily with food for 10 days.    lancets misc Use to test sugar up to 6 times daily as needed and directed    losartan (COZAAR) 50 mg, oral, 2 times daily    metoprolol tartrate (Lopressor) 50 mg tablet TAKE ONE TABLET BY MOUTH TWO TIMES A DAY    pantoprazole (ProtoNix) 20 mg EC tablet 1 po qd    pioglitazone (ACTOS) 15 mg, oral, Daily    rosuvastatin  "(CRESTOR) 20 mg, oral, Daily       Physical Examination:   Constitutional:       Appearance: Normal and healthy appearance. Well-developed, overweight and not in distress.   Neck:      Vascular: No JVR. JVD normal.   Pulmonary:      Effort: Pulmonary effort is normal.      Breath sounds: Normal breath sounds. No wheezing. No rhonchi. No rales.   Chest:      Chest wall: Not tender to palpatation.   Cardiovascular:      PMI at left midclavicular line. Normal rate. Regular rhythm. Normal S1. Normal S2.       Murmurs: There is a grade 3/6 systolic murmur at the URSB.      No gallop.  No click. No rub.   Pulses:     Intact distal pulses.   Edema:     Peripheral edema absent.   Abdominal:      Tenderness: There is no abdominal tenderness.   Musculoskeletal: Normal range of motion.         General: No tenderness. Skin:     General: Skin is warm and dry.   Neurological:      General: No focal deficit present.      Mental Status: Alert and oriented to person, place and time.            Lab:     CBC:   Lab Results   Component Value Date    WBC 7.1 03/22/2025    RBC 3.55 (L) 03/22/2025    HGB 11.2 (L) 03/22/2025    HCT 34.5 (L) 03/22/2025     03/22/2025        CMP:    Lab Results   Component Value Date     03/22/2025    K 4.7 03/22/2025     03/22/2025    CO2 26 03/22/2025    BUN 22 03/22/2025    CREATININE 1.43 (H) 03/22/2025    GLUCOSE 138 (H) 03/22/2025    CALCIUM 9.2 03/22/2025       Magnesium:    Lab Results   Component Value Date    MG 2.17 04/10/2024       Lipid Profile:    Lab Results   Component Value Date    TRIG 308 (H) 04/10/2024    HDL 27.6 04/10/2024    LDLCALC 18 04/10/2024    LDLDIRECT 57 12/09/2024       TSH:    Lab Results   Component Value Date    TSH 2.65 12/09/2024       BNP:   Lab Results   Component Value Date     (H) 09/06/2021        PT/INR:    No results found for: \"PROTIME\", \"INR\"    HgBA1c:    Lab Results   Component Value Date    HGBA1C 7.1 (H) 01/07/2025       BMP:  Lab " "Results   Component Value Date     03/22/2025     01/07/2025     12/09/2024    K 4.7 03/22/2025    K 5.3 01/07/2025    K 5.0 12/09/2024     03/22/2025     01/07/2025     12/09/2024    CO2 26 03/22/2025    CO2 29 01/07/2025    CO2 28 12/09/2024    BUN 22 03/22/2025    BUN 29 (H) 01/07/2025    BUN 26 (H) 12/09/2024    CREATININE 1.43 (H) 03/22/2025    CREATININE 1.96 (H) 01/07/2025    CREATININE 1.60 (H) 12/09/2024       CBC:  Lab Results   Component Value Date    WBC 7.1 03/22/2025    WBC 9.4 01/07/2025    WBC 7.5 12/09/2024    RBC 3.55 (L) 03/22/2025    RBC 3.77 (L) 01/07/2025    RBC 3.75 (L) 12/09/2024    HGB 11.2 (L) 03/22/2025    HGB 11.6 (L) 01/07/2025    HGB 11.7 (L) 12/09/2024    HCT 34.5 (L) 03/22/2025    HCT 37.9 01/07/2025    HCT 36.7 12/09/2024    MCV 97 03/22/2025     (H) 01/07/2025    MCV 98 12/09/2024    MCH 31.5 03/22/2025    MCH 30.8 01/07/2025    MCH 31.2 12/09/2024    MCHC 32.5 03/22/2025    MCHC 30.6 (L) 01/07/2025    MCHC 31.9 (L) 12/09/2024    RDW 14.5 03/22/2025    RDW 14.6 (H) 01/07/2025    RDW 14.3 12/09/2024     03/22/2025     01/07/2025     12/09/2024       Cardiac Enzymes:    No results found for: \"TROPHS\"    Hepatic Function Panel:    Lab Results   Component Value Date    ALKPHOS 53 12/09/2024    ALT 9 12/09/2024    AST 13 12/09/2024    PROT 6.8 12/09/2024    BILITOT 0.3 12/09/2024         Diagnostic Studies:     XR shoulder right 2+ views  Result Date: 3/22/2025  Interpreted By:  Lety Burch, STUDY: XR SHOULDER RIGHT 2+ VIEWS;  3/22/2025 10:44 am   INDICATION: Signs/Symptoms:Fall, pain.   COMPARISON: None.   ACCESSION NUMBER(S): [id number]   ORDERING CLINICIAN: ELOY HURLEY   FINDINGS: Hypertrophic bony changes are seen at the acromioclavicular joint, which narrow the joint. The glenohumeral joint is well preserved. There is no evidence of acute fracture or dislocation. Superior location of the humeral head is seen, which " may be seen with chronic rotator cuff tear per   No lytic or blastic lesions are seen. Left IJ catheter is noted with the distal tip projecting over the SVC.       No acute fracture or dislocation.   Degenerative changes at the acromioclavicular joint.   Superior location of the humeral head, which may be related to chronic rotator cuff tear.     MACRO: None   Signed by: Lety Burch 3/22/2025 11:53 AM Dictation workstation:   [id number]    XR knee left 4+ views  Result Date: 3/22/2025  Interpreted By:  Lety Burch, STUDY: XR KNEE LEFT 4+ VIEWS; XR TIBIA FIBULA LEFT 2 VIEWS;  3/22/2025 10:44 am   INDICATION: Signs/Symptoms:Fall, pain.   COMPARISON: 09/06/2024   ACCESSION NUMBER(S): [id number]; [id number]   ORDERING CLINICIAN: ELOY HURLEY   FINDINGS: Spurs are seen off of the distal femur and proximal tibia, including the tibial spines.  Spurs are also seen off of the patella. Narrowing of the medial and patellofemoral compartment is seen.   No acute fracture or dislocation is noted.   No periosteal reaction is seen.   There is trace suprapatellar effusion.   No definite fracture of the tibia or fibula       No acute fracture or dislocation.   Degenerative changes.   Trace suprapatellar effusion.     MACRO: None   Signed by: Lety Burch 3/22/2025 11:48 AM Dictation workstation:   [id number]    XR tibia fibula left 2 views  Result Date: 3/22/2025  Interpreted By:  Lety Burch, STUDY: XR KNEE LEFT 4+ VIEWS; XR TIBIA FIBULA LEFT 2 VIEWS;  3/22/2025 10:44 am   INDICATION: Signs/Symptoms:Fall, pain.   COMPARISON: 09/06/2024   ACCESSION NUMBER(S): [id number]; [id number]   ORDERING CLINICIAN: ELOY HURLEY   FINDINGS: Spurs are seen off of the distal femur and proximal tibia, including the tibial spines.  Spurs are also seen off of the patella. Narrowing of the medial and patellofemoral compartment is seen.   No acute fracture or dislocation is noted.   No periosteal reaction is seen.   There is trace  "suprapatellar effusion.   No definite fracture of the tibia or fibula       No acute fracture or dislocation.   Degenerative changes.   Trace suprapatellar effusion.     MACRO: None   Signed by: Lety Burch 3/22/2025 11:48 AM Dictation workstation:   [id number]      EKG:   No results found for: \"EKG\"      Radiology:     No orders to display                  [facility]  [address], [address], [place] [address]           Tel [phone] Fax [phone]     TRANSTHORACIC ECHOCARDIOGRAM REPORT     Patient Name:       DARVIN NGUYỄN LUIS Urbina Physician:    [id number] Donte Parson DO  Study Date:         11/29/2024          Ordering Provider:    [id number] DONTE PARSON  MRN/PID:            [id number]            Fellow:  Accession#:         [id number]        Nurse:  Date of Birth/Age:  1950 / 74 years Sonographer:          Donte Johnston  Gender Assigned at  F                   Additional Staff:  Birth:  Height:             157.48 cm           Admit Date:           11/29/2024  Weight:             68.49 kg            Admission Status:     Outpatient  BSA / BMI:          1.70 m2 / 27.62     Department Location:  [facility]                      kg/m2                                     [employer]  Blood Pressure: 130 /60 mmHg     Study Type:    TRANSTHORACIC ECHO (TTE) COMPLETE  Diagnosis/ICD: Other forms of dyspnea-R06.09; Nonrheumatic aortic (valve)                 stenosis-I35.0  Indication:    Dyspnea, AS  CPT Codes:     Echo Complete w Full Doppler-[id number]     Patient History:  Smoker:            Current.  Diabetes:          Yes  Pertinent History: COPD, Dyspnea, HTN, Hyperlipidemia, Murmur, Syncope and AS,                     MR.     Study Detail: The following Echo studies were performed: 2D, M-Mode, Doppler and                color flow. The " patient was awake.        PHYSICIAN INTERPRETATION:  Left Ventricle: The left ventricular systolic function is normal, with a visually estimated ejection fraction of 60-65%. There are no regional wall motion abnormalities. The left ventricular cavity size is normal. There is mild increased septal and mildly increased posterior left ventricular wall thickness. There is left ventricular concentric remodeling. Spectral Doppler shows a Grade I (impaired relaxation pattern) of left ventricular diastolic filling with normal left atrial filling pressure.  LV Wall Scoring:  All segments are normal.     Left Atrium: The left atrium is normal in size.  Right Ventricle: The right ventricle is normal in size. There is normal right ventricular global systolic function.  Right Atrium: The right atrium is normal in size.  Aortic Valve: The aortic valve appears abnormal. There is moderate aortic valve cusp calcification. There is evidence of moderate to severe aortic valve stenosis.  The aortic valve dimensionless index is 0.27. There is moderate aortic valve regurgitation. The peak instantaneous gradient of the aortic valve is 60 mmHg. The mean gradient of the aortic valve is 37 mmHg. Aortic Stenosis has progressed [id number] study.  Mitral Valve: The mitral valve is normal in structure. There is mild thickening and calcification of the anterior and posterior mitral valve leaflets. There is no evidence of mitral valve stenosis. The doppler estimated mean and peak diastolic pressure gradients are 2 mmHg and 10 mmHg respectively. There is normal mitral valve leaflet mobility. There is moderate mitral annular calcification. The peak instantaneous gradient of the mitral valve is 10 mmHg. There is mild mitral valve regurgitation.  Tricuspid Valve: The tricuspid valve is structurally normal. There is normal tricuspid valve leaflet mobility. There is mild tricuspid regurgitation.  Pulmonic Valve: The pulmonic valve is structurally normal.  There is no indication of pulmonic valve regurgitation.  Pericardium: No pericardial effusion noted.  Aorta: The aortic root is normal.  Pulmonary Artery: The main pulmonary artery is normal in size, and position, with normal bifurcation into the left and right pulmonary arteries. The tricuspid regurgitant velocity is 2.56 m/s, and with an estimated right atrial pressure of 3 mmHg, the estimated pulmonary artery pressure is mildly elevated with the RVSP at 29.2 mmHg.  Systemic Veins: The inferior vena cava appears normal in size.  In comparison to the previous echocardiogram(s): The left ventricular function is unchanged. The left ventricular diastolic function is unchanged.        CONCLUSIONS:   1. The left ventricular systolic function is normal, with a visually estimated ejection fraction of 60-65%.   2. No regional wall motion abnormalities.   3. Spectral Doppler shows a Grade I (impaired relaxation pattern) of left ventricular diastolic filling with normal left atrial filling pressure.   4. There is normal right ventricular global systolic function.   5. There is moderate mitral annular calcification.   6. There is no evidence of mitral valve stenosis.   7. Mild mitral valve regurgitation.   8. Mild tricuspid regurgitation is visualized.   9. Moderate to severe aortic valve stenosis.  10. There is moderate aortic valve cusp calcification.  11. Moderate aortic valve regurgitation.  12. The main pulmonary artery is normal in size, and position, with normal bifurcation into the left and right pulmonary arteries.     QUANTITATIVE DATA SUMMARY:     2D MEASUREMENTS:           Normal Ranges:  Ao Root d:       2.50 cm   (2.0-3.7cm)  LAs:             3.00 cm   (2.7-4.0cm)  RVIDd:           1.90 cm   (0.9-3.6cm)  IVSd:            1.10 cm   (0.6-1.1cm)  LVPWd:           1.10 cm   (0.6-1.1cm)  LVIDd:           4.20 cm   (3.9-5.9cm)  LVIDs:           3.00 cm  LV Mass Index:   92.6 g/m2  LV % FS          28.6 %        LA  VOLUME:                   Normal Ranges:  LA Area A4C:      15.1 cm2  LA Area A2C:      17.6 cm2  LA Volume Index:  27.6 ml/m2  LA Vol A4C:       40.0 ml  LA Vol A2C:       54.0 ml  LA Vol Index BSA: 27.7 ml/m2  LA Vol BP:        47.0 ml        AORTA MEASUREMENTS:         Normal Ranges:  Asc Ao, d:          2.60 cm (2.1-3.4cm)        LV SYSTOLIC FUNCTION BY 2D PLANIMETRY (MOD):                       Normal Ranges:  EF-A4C View:    63 % (>=55%)  EF-A2C View:    66 %  EF-Biplane:     66 %  EF-Visual:      63 %  LV EF Reported: 63 %        LV DIASTOLIC FUNCTION:             Normal Ranges:  MV Peak E:             0.87 m/s    (0.7-1.2 m/s)  MV Peak A:             1.32 m/s    (0.42-0.7 m/s)  E/A Ratio:             0.66        (1.0-2.2)  MV e'                  0.071 m/s   (>8.0)  MV lateral e'          0.08 m/s  MV medial e'           0.06 m/s  MV A Dur:              173.00 msec  E/e' Ratio:            12.23       (<8.0)  PulmV Sys Tay:         62.70 cm/s  PulmV [place] Tay:        38.50 cm/s  PulmV S/D Tay:         1.60  PulmV A Revs Tay:      39.00 cm/s  PulmV A Revs Dur:      113.00 msec        MITRAL VALVE:          Normal Ranges:  MV Vmax:      1.56 m/s (<=1.3m/s)  MV peak P.7 mmHg (<5mmHg)  MV mean P.0 mmHg (<48mmHg)  MV DT:        232 msec (150-240msec)        AORTIC VALVE:                      Normal Ranges:  AoV Vmax:                3.87 m/s  (<=1.7m/s)  AoV Peak P.9 mmHg (<20mmHg)  AoV Mean P.0 mmHg (1.7-11.5mmHg)  LVOT Max Tay:            1.20 m/s  (<=1.1m/s)  AoV VTI:                 107.00 cm (18-25cm)  LVOT VTI:                29.10 cm  LVOT Diameter:           1.80 cm   (1.8-2.4cm)  AoV Area, VTI:           0.69 cm2  (2.5-5.5cm2)  AoV Area,Vmax:           0.79 cm2  (2.5-4.5cm2)  AoV Dimensionless Index: 0.27        AORTIC INSUFFICIENCY:  AI Vmax:       3.79 m/s  AI Half-time:  384 msec  AI Decel Rate: 306.00 cm/s2        TRICUSPID VALVE/RVSP:          Normal  Ranges:  Peak TR Velocity:     2.56 m/s  Est. RA Pressure:     3 mmHg  RV Syst Pressure:     29 mmHg  (< 30mmHg)        PULMONIC VALVE:           Normal Ranges:  PV Accel Time:  116 msec  (>120ms)  PV Max Tay:     1.6 m/s   (0.6-0.9m/s)  PV Max PG:      10.1 mmHg        Pulmonary Veins:  PulmV A Revs Dur: 113.00 msec  PulmV A Revs Tay: 39.00 cm/s  PulmV [place] Tay:   38.50 cm/s  PulmV S/D Tay:    1.60  PulmV Sys Tay:    62.70 cm/s        [id number] Donte Parson DO  Electronically signed on 11/29/2024 at 3:25:58 PM        Wall Scoring         Interpreted By:  Donte Parson,  and Kwaku Kent   STUDY:  MYOCARDIAL PERFUSION STRESS TEST WITH LEXISCAN      Performing facility:  [facility], [facility],  [address] [place] [address] [id number] Provider:  Donte Parson DO, FACC  PCP:  Dr. LAVELL Garrett  Supervising provider:  Greta Alexander MD, FACC, FACP, RS      INDICATION:  SOB;      HISTORY:  Gender:  F; Age:  73 y/o ; Height:  .5 cm cm; Weight:   WT  66.225 kg kg.      High Cholesterol;  Diabetes;  HTN;  SOB;  Syncope;  COPD;  Breast CA  Currently smoking.          COMPARISON:  Previous nuclear testing completed on2017 at [facility].          ACCESSION NUMBER(S):  [id number]      ORDERING CLINICIAN:  DONTE PARSON      TECHNIQUE:  ONE DAY protocol.  Stress injection: Date:5-17-24, 34.3 mCi of Myoview IV 20 seconds  after rapid injection of Lexiscan. Rest injection: Date: 5-17-24,  11.2 mCi of Myoview IV at rest. The patient had a rapid injection of  0.4 mg of Lexiscan IV over 10 seconds. Imaging was performed by  gated tomographic technique. Reason for Lexiscan: Knee/ back pain      STRESS TEST DATA:  Resting heart rate was 62 BPM.  Resting blood pressure was 140/64 mmHg.  Peak blood pressure was 132/62 mmHg.  Peak heart rate was 85 BPM.  Aminophylline given 50mg IV.  TEST TERMINATED DUE TO:  Protocol completed      FINDINGS:  STRESS TEST RESULTS:      Resting electrocardiogram  revealed normal sinus rhythm, normal EKG.  There were no significant ischemic ECG changes or dysrhythmias.  The patient did not have chest abdominal pain occurred inpatient was  administered aminophylline with recovery. There was a normal recovery  phase.      IMAGING RESULTS:      Imaging results are good  No signs of myocardial ischemia  No signs of myocardial infarction  Normal wall motion  Ejection fraction 75%  No significant artifact      IMPRESSION:  Normal Lexiscan Myoview cardiac perfusion stress test.  No evidence of ischemia or myocardial infarction by perfusion imaging.  Normal left ventricular systolic function, ejection fraction 75%.  The above study is normal. It compares favorably to a study done  similarly dated 06/21/2017..      Signed by: Donte Parson 5/17/2024 11:27 AM  Dictation workstation:   [id number]  Assessment/Plan:     Patient Active Problem List   Diagnosis    Drug therapy    Type 2 diabetes mellitus without complication, without long-term current use of insulin    Vitamin D deficiency    Hyperlipidemia    Renal insufficiency    Hypertension    Anxiety and depression    Chronic obstructive pulmonary disease (Multi)    Encounter for care related to vascular access port    Aortic stenosis, moderate    Benign essential hypertension    Bilateral carotid bruits    Carotid artery stenosis    Chronic dyspnea    Dyspnea on exertion    Mitral valve insufficiency    Murmur, cardiac    Syncope    Atherosclerosis of aorta (CMS-HCC)    Malignant neoplasm of central portion of right female breast, unspecified estrogen receptor status    Type 2 diabetes mellitus with stage 4 chronic kidney disease, without long-term current use of insulin (Multi)    Stage 3 chronic kidney disease, unspecified whether stage 3a or 3b CKD (Multi)    Chronic pain of both knees    Body mass index (BMI) of 29.0 to 29.9 in adult    Former smoker         ASSESSMENT     Diagnoses and all orders for this visit:  Murmur,  cardiac  Mitral valve insufficiency, unspecified etiology  Primary hypertension  Mixed hyperlipidemia  Stenosis of carotid artery, unspecified laterality  Atherosclerosis of aorta (CMS-HCC)  Aortic stenosis, moderate  Body mass index (BMI) of 29.0 to 29.9 in adult  Former smoker  Dyspnea on exertion    75-year-old female here for routine follow-up.    Meds, vitals, examination as noted.    Chart review details cussed patient at length    Pression:  Moderate to severe or greater aortic stenosis  Mitral valve and tricuspid valve insufficiency  Carotid atherosclerosis  Presumed coronary disease  Former smoker  Dyspnea on exertion and increasing  Type 2 diabetes  Dyslipidemia  Hypertension  PLAN   Recommendation:  Proceed with left heart catheterization for valve and coronary evaluation  Continue current meds  Disposition to follow  Discussed with Dr. Newman will perform this next week  Patient to call should any other issues or problems otherwise develop        IAugusta  am scribing for, and in the presence of Dr. Parson .    I, Dr. Parson , personally performed the services described in the documentation as scribed by [place]a   in my presence, and confirm it is both accurate and complete.      Dr. Donte Parson, DO  Thank you for allowing me to participate in the care of this patient. Please do not hesitate to contact me with any further questions or concerns.

## 2025-05-08 NOTE — PATIENT INSTRUCTIONS
Pre-Procedure Patient Information    You have been scheduled for HEART CATH  At: [facility]  With: Dr. Ponce  Date of procedure: WED. 5-14-25              REPORT TO 2ND FLOOR OUTPATIENT AREA    1. Please have transportation to and from the hospital. While you should plan for same-day discharge there is a possibility you will need to stay overnight.    2. You will receive a call from the hospital 24 hours before your procedure providing you with fasting instructions, procedure location detail, and time of arrival.  If you have not received a call from the hospital by 6 pm the day before your scheduled procedure, please call [phone].    3. Please bring a current list of medications with you to the hospital.      4. Medications to hold:  Hold Pioglitazone the morning of the procedure     - Otherwise, you may continue your medications in the morning with sips of water.     5. Nothing to eat after midnight before the procedure. OK to take morning medications, with above exceptions, the day of the procedure with a small sip of water.     6. Please bring to our attention if you have any contrast, latex or metal allergies.    7. Please have your blood work as instructed completed at least a day before your procedure.    8. If you have any questions, please contact the office at [phone].    Follow up with Dr. Parson after your test

## 2025-05-09 LAB
ANION GAP SERPL CALCULATED.4IONS-SCNC: 13 MMOL/L (CALC) (ref 7–17)
BUN SERPL-MCNC: 45 MG/DL (ref 7–25)
BUN/CREAT SERPL: 31 (CALC) (ref 6–22)
CALCIUM SERPL-MCNC: 9.1 MG/DL (ref 8.6–10.4)
CHLORIDE SERPL-SCNC: 104 MMOL/L (ref 98–110)
CO2 SERPL-SCNC: 23 MMOL/L (ref 20–32)
CREAT SERPL-MCNC: 1.43 MG/DL (ref 0.6–1)
EGFRCR SERPLBLD CKD-EPI 2021: 38 ML/MIN/1.73M2
ERYTHROCYTE [DISTWIDTH] IN BLOOD BY AUTOMATED COUNT: 13 % (ref 11–15)
GLUCOSE SERPL-MCNC: 88 MG/DL (ref 65–139)
HCT VFR BLD AUTO: 36.5 % (ref 35–45)
HGB BLD-MCNC: 11.8 G/DL (ref 11.7–15.5)
INR PPP: 1
MCH RBC QN AUTO: 30.8 PG (ref 27–33)
MCHC RBC AUTO-ENTMCNC: 32.3 G/DL (ref 32–36)
MCV RBC AUTO: 95.3 FL (ref 80–100)
PLATELET # BLD AUTO: 283 THOUSAND/UL (ref 140–400)
PMV BLD REES-ECKER: 11.3 FL (ref 7.5–12.5)
POTASSIUM SERPL-SCNC: 4.6 MMOL/L (ref 3.5–5.3)
PROTHROMBIN TIME: 10.9 SEC (ref 9–11.5)
RBC # BLD AUTO: 3.83 MILLION/UL (ref 3.8–5.1)
SODIUM SERPL-SCNC: 140 MMOL/L (ref 135–146)
WBC # BLD AUTO: 12.6 THOUSAND/UL (ref 3.8–10.8)

## 2025-05-14 ENCOUNTER — HOSPITAL ENCOUNTER (OUTPATIENT)
Facility: HOSPITAL | Age: 75
Setting detail: OUTPATIENT SURGERY
Discharge: HOME | End: 2025-05-14
Payer: MEDICARE

## 2025-05-14 ENCOUNTER — APPOINTMENT (OUTPATIENT)
Dept: CARDIOLOGY | Facility: HOSPITAL | Age: 75
End: 2025-05-14
Payer: MEDICARE

## 2025-05-14 VITALS
TEMPERATURE: 98.2 F | WEIGHT: 162.48 LBS | BODY MASS INDEX: 29.9 KG/M2 | HEART RATE: 61 BPM | SYSTOLIC BLOOD PRESSURE: 143 MMHG | RESPIRATION RATE: 18 BRPM | DIASTOLIC BLOOD PRESSURE: 63 MMHG | HEIGHT: 62 IN | OXYGEN SATURATION: 98 %

## 2025-05-14 DIAGNOSIS — I35.0 NONRHEUMATIC AORTIC (VALVE) STENOSIS: Primary | ICD-10-CM

## 2025-05-14 DIAGNOSIS — I35.0 AORTIC STENOSIS, MODERATE: ICD-10-CM

## 2025-05-14 DIAGNOSIS — I35.0 SEVERE AORTIC STENOSIS: ICD-10-CM

## 2025-05-14 DIAGNOSIS — R06.09 DYSPNEA ON EXERTION: ICD-10-CM

## 2025-05-14 DIAGNOSIS — I65.29 STENOSIS OF CAROTID ARTERY, UNSPECIFIED LATERALITY: Primary | ICD-10-CM

## 2025-05-14 DIAGNOSIS — I35.2 NONRHEUMATIC AORTIC (VALVE) STENOSIS WITH INSUFFICIENCY: ICD-10-CM

## 2025-05-14 PROCEDURE — C1894 INTRO/SHEATH, NON-LASER: HCPCS

## 2025-05-14 PROCEDURE — 99153 MOD SED SAME PHYS/QHP EA: CPT

## 2025-05-14 PROCEDURE — C1760 CLOSURE DEV, VASC: HCPCS

## 2025-05-14 PROCEDURE — 2720000007 HC OR 272 NO HCPCS

## 2025-05-14 PROCEDURE — 99152 MOD SED SAME PHYS/QHP 5/>YRS: CPT

## 2025-05-14 PROCEDURE — 99024 POSTOP FOLLOW-UP VISIT: CPT | Performed by: NURSE PRACTITIONER

## 2025-05-14 PROCEDURE — 96373 THER/PROPH/DIAG INJ IA: CPT

## 2025-05-14 PROCEDURE — 2500000004 HC RX 250 GENERAL PHARMACY W/ HCPCS (ALT 636 FOR OP/ED): Mod: JZ | Performed by: NURSE PRACTITIONER

## 2025-05-14 PROCEDURE — C1769 GUIDE WIRE: HCPCS

## 2025-05-14 PROCEDURE — 2500000004 HC RX 250 GENERAL PHARMACY W/ HCPCS (ALT 636 FOR OP/ED)

## 2025-05-14 PROCEDURE — 93005 ELECTROCARDIOGRAM TRACING: CPT | Mod: 59

## 2025-05-14 PROCEDURE — 7100000010 HC PHASE TWO TIME - EACH INCREMENTAL 1 MINUTE

## 2025-05-14 PROCEDURE — 7100000002 HC RECOVERY ROOM TIME - EACH INCREMENTAL 1 MINUTE

## 2025-05-14 PROCEDURE — 2780000003 HC OR 278 NO HCPCS

## 2025-05-14 PROCEDURE — 93454 CORONARY ARTERY ANGIO S&I: CPT

## 2025-05-14 PROCEDURE — 7100000001 HC RECOVERY ROOM TIME - INITIAL BASE CHARGE

## 2025-05-14 PROCEDURE — 93010 ELECTROCARDIOGRAM REPORT: CPT | Performed by: INTERNAL MEDICINE

## 2025-05-14 PROCEDURE — 2500000001 HC RX 250 WO HCPCS SELF ADMINISTERED DRUGS (ALT 637 FOR MEDICARE OP): Performed by: NURSE PRACTITIONER

## 2025-05-14 PROCEDURE — C1887 CATHETER, GUIDING: HCPCS

## 2025-05-14 PROCEDURE — 7100000009 HC PHASE TWO TIME - INITIAL BASE CHARGE

## 2025-05-14 PROCEDURE — 2550000001 HC RX 255 CONTRASTS

## 2025-05-14 PROCEDURE — 93458 L HRT ARTERY/VENTRICLE ANGIO: CPT

## 2025-05-14 RX ORDER — SODIUM CHLORIDE 9 MG/ML
75 INJECTION, SOLUTION INTRAVENOUS CONTINUOUS
Status: ACTIVE | OUTPATIENT
Start: 2025-05-14 | End: 2025-05-14

## 2025-05-14 RX ORDER — FENTANYL CITRATE 50 UG/ML
INJECTION, SOLUTION INTRAMUSCULAR; INTRAVENOUS AS NEEDED
Status: DISCONTINUED | OUTPATIENT
Start: 2025-05-14 | End: 2025-05-14 | Stop reason: HOSPADM

## 2025-05-14 RX ORDER — MIDAZOLAM HYDROCHLORIDE 1 MG/ML
INJECTION, SOLUTION INTRAMUSCULAR; INTRAVENOUS AS NEEDED
Status: DISCONTINUED | OUTPATIENT
Start: 2025-05-14 | End: 2025-05-14 | Stop reason: HOSPADM

## 2025-05-14 RX ORDER — LIDOCAINE HYDROCHLORIDE 20 MG/ML
INJECTION, SOLUTION INFILTRATION; PERINEURAL AS NEEDED
Status: DISCONTINUED | OUTPATIENT
Start: 2025-05-14 | End: 2025-05-14 | Stop reason: HOSPADM

## 2025-05-14 RX ORDER — HEPARIN SODIUM 1000 [USP'U]/ML
INJECTION, SOLUTION INTRAVENOUS; SUBCUTANEOUS AS NEEDED
Status: DISCONTINUED | OUTPATIENT
Start: 2025-05-14 | End: 2025-05-14 | Stop reason: HOSPADM

## 2025-05-14 RX ORDER — NITROGLYCERIN 40 MG/100ML
INJECTION INTRAVENOUS AS NEEDED
Status: DISCONTINUED | OUTPATIENT
Start: 2025-05-14 | End: 2025-05-14 | Stop reason: HOSPADM

## 2025-05-14 RX ORDER — RANOLAZINE 500 MG/1
500 TABLET, EXTENDED RELEASE ORAL ONCE
Status: COMPLETED | OUTPATIENT
Start: 2025-05-14 | End: 2025-05-14

## 2025-05-14 RX ORDER — ASPIRIN 325 MG
325 TABLET ORAL ONCE
Status: DISCONTINUED | OUTPATIENT
Start: 2025-05-14 | End: 2025-05-14

## 2025-05-14 RX ADMIN — RANOLAZINE 500 MG: 500 TABLET, FILM COATED, EXTENDED RELEASE ORAL at 10:22

## 2025-05-14 RX ADMIN — SODIUM CHLORIDE 75 ML/HR: 0.9 INJECTION, SOLUTION INTRAVENOUS at 10:58

## 2025-05-14 ASSESSMENT — PAIN SCALES - GENERAL

## 2025-05-14 NOTE — SIGNIFICANT EVENT
"Discharge instructions given via \"teach back\" method. Covered: Post C discharge instructions/restrictions, medications/when to resume them, and follow up appointments. Pt states understanding and answers follow up questions correctly. Ambulated to  with no complaints of dizziness/lightheadedness/pain. Right radial site remains soft and stable with no hematoma or oozing. Ready to discharge home via wheelchair once son arrives.  "

## 2025-05-14 NOTE — DISCHARGE INSTRUCTIONS
**The  Structural Heart and Valve Team will be in contact with you soon to schedule your CT TAVR protocol as well as follow-up with Dr. Ponce.    [phone]

## 2025-05-14 NOTE — POST-PROCEDURE NOTE
Physician Transition of Care Summary  Invasive Cardiovascular Lab    Procedure Date: 5/14/2025  Attending:    * Blaine Ponce - Primary  Resident/Fellow/Other Assistant: Surgeons and Role:  * No surgeons found with a matching role *    Indications:   Pre-op Diagnosis      * Stenosis of carotid artery, unspecified laterality [I65.29]     * Aortic stenosis, moderate [I35.0]     * Dyspnea on exertion [R06.09]    Post-procedure diagnosis:   Post-op Diagnosis     * Stenosis of carotid artery, unspecified laterality [I65.29]     * Aortic stenosis, moderate [I35.0]     * Dyspnea on exertion [R06.09]    Procedure(s):   [facility], With LV  [address] - MI CATH PLMT L HRT & ARTS W/NJX & ANGIO IMG S&I        Procedure Findings:   Aortic stenosis  No coronary artery disease    Description of the Procedure:   Right radial artery    Complications:   No    Stents/Implants:   Implants       No implant documentation for this case.            Anticoagulation/Antiplatelet Plan:   IV heparin    Estimated Blood Loss:   5 mL    Anesthesia: Moderate Sedation Anesthesia Staff: No anesthesia staff entered.    Any Specimen(s) Removed:   No specimens collected during this procedure.    Disposition:   TAVR screening with CT      Electronically signed by: Blaine Ponce MD, 5/14/2025 2:25 PM

## 2025-05-14 NOTE — SIGNIFICANT EVENT
TR Band removed per physician order. Right radial site soft and stable with no hematoma or oozing, quick clot/tegaderm/pressure dressing applied to site. Pt denies dizziness/lightheadedness/pain. Resting comfortably at this time.

## 2025-05-14 NOTE — SIGNIFICANT EVENT
Focused assessment performed and WDL. Right radial site soft and stable with no hematoma or oozing; TR Band in place. Educated Pt on current restrictions r/t right radial arterial puncture, she states understanding and denies needs at this time. No current complaints of dizziness/lightheadedness/pain. Sitting up eating turkey sandwich box. Family at bedside.

## 2025-05-14 NOTE — PROGRESS NOTES
Patient is stable status post C/RHC under the care of Dr. Ponce.  Discussed results of procedures with patient.  Pictures of C provided.  Findings of the [facility] revealed normal coronary arteries.  Patient has severe aortic valve stenosis.  She is currently being worked up for possible TAVR.  Will order outpatient CT TAVR protocol and request follow-up with Dr. Ponce shortly thereafter.  Order placed for CT TAVR protocol and message sent to schedulers to arrange appointments.  Instructed patient to obtain a dental exam/clearance as soon as able for her upcoming valve procedure.  She will be discharged to home later this afternoon barring no postprocedural complications.  Postprocedural activity, restrictions, potential complications, medications and future follow-up discussed at length.  All questions answered.  Patient verbalized an understanding.

## 2025-05-16 ENCOUNTER — HOSPITAL ENCOUNTER (OUTPATIENT)
Dept: RADIOLOGY | Facility: HOSPITAL | Age: 75
Discharge: HOME | End: 2025-05-16
Payer: MEDICARE

## 2025-05-16 ENCOUNTER — TELEPHONE (OUTPATIENT)
Dept: CARDIOLOGY | Facility: CLINIC | Age: 75
End: 2025-05-16

## 2025-05-16 ENCOUNTER — APPOINTMENT (OUTPATIENT)
Dept: RADIOLOGY | Facility: CLINIC | Age: 75
End: 2025-05-16
Payer: MEDICARE

## 2025-05-16 DIAGNOSIS — I35.0 SEVERE AORTIC STENOSIS: ICD-10-CM

## 2025-05-16 DIAGNOSIS — N18.30 STAGE 3 CHRONIC KIDNEY DISEASE, UNSPECIFIED WHETHER STAGE 3A OR 3B CKD (MULTI): ICD-10-CM

## 2025-05-16 LAB
ATRIAL RATE: 58 BPM
P AXIS: 49 DEGREES
P OFFSET: 195 MS
P ONSET: 148 MS
PR INTERVAL: 156 MS
Q ONSET: 226 MS
QRS COUNT: 9 BEATS
QRS DURATION: 80 MS
QT INTERVAL: 422 MS
QTC CALCULATION(BAZETT): 414 MS
QTC FREDERICIA: 417 MS
R AXIS: 28 DEGREES
T AXIS: 57 DEGREES
T OFFSET: 437 MS
VENTRICULAR RATE: 58 BPM

## 2025-05-16 PROCEDURE — 74174 CTA ABD&PLVS W/CONTRAST: CPT

## 2025-05-16 PROCEDURE — 2550000001 HC RX 255 CONTRASTS

## 2025-05-16 PROCEDURE — 2500000004 HC RX 250 GENERAL PHARMACY W/ HCPCS (ALT 636 FOR OP/ED): Mod: JZ

## 2025-05-16 RX ORDER — SODIUM CHLORIDE 9 MG/ML
100 INJECTION, SOLUTION INTRAVENOUS CONTINUOUS
Status: ACTIVE | OUTPATIENT
Start: 2025-05-16 | End: 2025-05-16

## 2025-05-16 RX ORDER — SODIUM CHLORIDE 9 MG/ML
100 INJECTION, SOLUTION INTRAVENOUS ONCE
Status: SHIPPED | OUTPATIENT
Start: 2025-05-16

## 2025-05-16 RX ADMIN — SODIUM CHLORIDE 100 ML/HR: 0.9 INJECTION, SOLUTION INTRAVENOUS at 11:49

## 2025-05-16 RX ADMIN — IOHEXOL 100 ML: 350 INJECTION, SOLUTION INTRAVENOUS at 15:04

## 2025-06-02 ENCOUNTER — OFFICE VISIT (OUTPATIENT)
Dept: CARDIOLOGY | Facility: CLINIC | Age: 75
End: 2025-06-02
Payer: MEDICARE

## 2025-06-02 ENCOUNTER — OFFICE VISIT (OUTPATIENT)
Dept: CARDIAC SURGERY | Facility: CLINIC | Age: 75
End: 2025-06-02
Payer: MEDICARE

## 2025-06-02 VITALS
WEIGHT: 160 LBS | HEIGHT: 62 IN | SYSTOLIC BLOOD PRESSURE: 143 MMHG | TEMPERATURE: 97.5 F | DIASTOLIC BLOOD PRESSURE: 56 MMHG | BODY MASS INDEX: 29.44 KG/M2 | HEART RATE: 84 BPM | OXYGEN SATURATION: 97 %

## 2025-06-02 DIAGNOSIS — I67.858 OTHER HEREDITARY CEREBROVASCULAR DISEASE: ICD-10-CM

## 2025-06-02 DIAGNOSIS — I35.0 NONRHEUMATIC AORTIC VALVE STENOSIS: Primary | ICD-10-CM

## 2025-06-02 DIAGNOSIS — R06.09 CHRONIC DYSPNEA: ICD-10-CM

## 2025-06-02 DIAGNOSIS — Z87.891 FORMER SMOKER: ICD-10-CM

## 2025-06-02 DIAGNOSIS — I10 PRIMARY HYPERTENSION: ICD-10-CM

## 2025-06-02 DIAGNOSIS — I35.0 SEVERE AORTIC STENOSIS: ICD-10-CM

## 2025-06-02 PROCEDURE — 99215 OFFICE O/P EST HI 40 MIN: CPT | Performed by: THORACIC SURGERY (CARDIOTHORACIC VASCULAR SURGERY)

## 2025-06-02 PROCEDURE — 99215 OFFICE O/P EST HI 40 MIN: CPT

## 2025-06-02 PROCEDURE — 99205 OFFICE O/P NEW HI 60 MIN: CPT | Performed by: THORACIC SURGERY (CARDIOTHORACIC VASCULAR SURGERY)

## 2025-06-02 PROCEDURE — 1159F MED LIST DOCD IN RCRD: CPT

## 2025-06-02 PROCEDURE — 3077F SYST BP >= 140 MM HG: CPT

## 2025-06-02 PROCEDURE — 3078F DIAST BP <80 MM HG: CPT

## 2025-06-02 ASSESSMENT — PATIENT HEALTH QUESTIONNAIRE - PHQ9
2. FEELING DOWN, DEPRESSED OR HOPELESS: NOT AT ALL
SUM OF ALL RESPONSES TO PHQ9 QUESTIONS 1 AND 2: 0
1. LITTLE INTEREST OR PLEASURE IN DOING THINGS: NOT AT ALL

## 2025-06-02 NOTE — PROGRESS NOTES
Referred by Donte Bush DO  Chief complaint:   Chief Complaint   Patient presents with    Consult    Valve Disorder        Valvular and structural clinic:  Interventional Cardiologist: Blaine Ponce MD  Cardiothoracic Surgeon: Ortega Lerma MD  Nurse: Dov Jack RN    History of Present Illness  Alma Lane is a 75 y.o. year old female patient with history of Carotid Stenosis, Diabetes Mellitus, Hyperlipidemia, Hypertension, overweight, unfortunately a current smoker.   Referred by Dr. Eb MD for Severe Aortic Valve Stenosis.      Outpatient Medications:  Current Outpatient Medications   Medication Instructions    aspirin 81 mg chewable tablet 1 tablet, Daily    benzonatate (TESSALON) 100 mg, oral, 3 times daily PRN, Do not crush or chew.    Blood glucose monitoring meter Used to check sugar up to 6 times a day as needed.    blood sugar diagnostic (Blood Glucose Test) strip Used to test sugars up to 6 times daily as needed.    cholecalciferol (VITAMIN D-3) 125 mcg, Daily    cyanocobalamin (VITAMIN B-12) 100 mcg, Daily    lancets misc Use to test sugar up to 6 times daily as needed and directed    losartan (COZAAR) 50 mg, oral, 2 times daily    metoprolol tartrate (Lopressor) 50 mg tablet TAKE ONE TABLET BY MOUTH TWO TIMES A DAY    pantoprazole (ProtoNix) 20 mg EC tablet 1 po qd    pioglitazone (ACTOS) 15 mg, oral, Daily    rosuvastatin (CRESTOR) 20 mg, oral, Daily         Vitals:  Vitals:    06/02/25 1344   BP: 143/56   Pulse: 84   Temp: 36.4 °C (97.5 °F)   SpO2: 97%       Physical Exam:  General: NAD, well-appearing  HEENT: moist mucous membranes, no jaundice  Neck: No JVD, no carotid bruit  Lungs: CTA murtaza, no wheezing or rales  Cardiac: RRR, no murmurs  Abdomen: soft, non-tender, non-distended  Extremities: 2+ radial pulses, mild edema, palpable pulses  Skin: warm, dry, no wound  Neurologic: AAOx3,  no focal deficits      Assessment/Plan   Diagnoses and all orders for this  visit:  Severe aortic stenosis  Primary hypertension  Body mass index (BMI) of 29.0 to 29.9 in adult  Chronic dyspnea  Former smoker      TAVR Workup    KCCQ Questionnaire  1  Heart failure affects different people in different ways. Some feel shortness of breath while others feel fatigue. Please indicate how much you are limited by heart failure (shortness of breath or fatigue) in your ability to do the following activities over the past 2 weeks.     A.) Showering/bathing  2. Quite a bit  B.) Walking 1 block on level ground 4. Slightly  C.) Hurrying or Jogging   5. Not at All    2.  Over the past 2 weeks, how many times did you have swelling in your feet, ankles or legs when you woke up in the morning? 5. Never    3.  Over the past 2 weeks, on average, how many times has fatigue limited your ability to do what you wanted? 2. Several times a day    4.  Over the past 2 weeks, on average, how many times has shortness of breath limited your ability to do what you wanted? 2. Several times a day    5.  Over the past 2 weeks, on average, how many times have you been forced to sleep sitting up in a chair or with at least 3 pillows to prop you up because of shortness of breath? Never    6. Over the past 2 weeks, how much has your heart failure limited your enjoyment of life? It has moderately limited my enjoyment of life    7. If you had to spend the rest of your life with your heart failure the way it is right now, how would you feel about this? 2. Mostly dissatisfied    8. How much does your heart failure affect your lifestyle? Please indicate how your heart failure may have limited your participation in the following activities over the past 2 weeks    A.)  Hobbies, recreational activities  6. Does not apply for other reasons    B.) Working or doing household chores  4. Slightly limited    C.) Visiting family or friends out of your home  4. Slightly limited     Five Meter Walk Test: 5.31 second     Short Term Surgical  Risk (STS):  Isolated AVR:  Procedure Type: Isolated AVR  Perioperative Outcome Estimate %  Operative Mortality 4.96%  Morbidity & Mortality 14.4%  Stroke 2.26%  Renal Failure 3.63%  Reoperation 3.53%  Prolonged Ventilation 8.68%  Deep Sternal Wound Infection 0.064%  Long Hospital Stay (>14 days) 7.67%  Short Hospital Stay (<6 days)* 34.2%      C: 5/14/25:   1. Severe aortic valve stenosis.   2. Normal coronary arteries.    Surface Echo: 11/29/24:  1. The left ventricular systolic function is normal, with a visually estimated ejection fraction of 60-65%.  Moderate to severe aortic valve stenosis.  There is moderate aortic valve cusp calcification.  Moderate aortic valve regurgitation.  AoV Index- 0.27  AoV Area- 0.69cm2    TAVR CT Scan: 5/17/25:  1. Calcified trileaflet aortic valve. Aortic valve calcium score  1585  2. Aortic annulus  27 x 20mm  3. Moderate atherosclerosis of thoracic aorta without evidence of  aneurysm, dissection, or coarctation.  4. Infrarenal abdominal aorta aneurysm 30 mm.    Dental clearance: Patient follows with [facility]- will fax this visit to their office and ask for clearance.     Frailty:  The Frailty Index for Elders (FIFE)   Item                                                                                          Response      1.  Do you need help getting in or out of bed?                     NO  2.  Do you need help with washing or bathing?                  NO  3.  Without wanting to, have you lost or gained 10 pounds in the last 6 months?        NO  4.  Do you have tooth or mouth problems that make it hard to eat?             NO     5.  Do you have a poor appetite and quickly feel full when you eat?            NO      6.  Did your physical health or emotional problems interfere with your social activities?     YES   7.  Would you say your health is fair or poor?      YES  8.  Do you get tired easily?                        YES  9.  Were you hospitalized in the  last 3 months?                  NO  10. Did you visit an emergency room for a health problem in the past 3 months?     NO     Scoring:   A score of 0 indicates no frailty   A score of 1-3 indicates frailty risk   A score of 4 or greater indicates frailty       Patient relates a five month history of worsening fatigue, SOB, MARIE.  Denies overt orthopnea, PND, exertional CP.  Has occasional dizziness.  Denies syncope or presyncope.  Denies increased abdominal girth, edema.  Gradually doing less and less activity secondary to symptoms.      The overall decision regarding the best treatment for this condition is complex.  We discussed options of both surgical aortic valve replacement and transcatheter aortic valve replacement along with risks and benefits involved with both of them in detail.    We discussed all the risks associated with the procedure, including but not limited to stroke, MI, pericardial tamponade, vascular complications, infection and death were discussed with the patient. The risk of needing a permament pacemaker was also discussed in detail. The patient verbalized understanding and decided to proceed with the procedure.     Favor transcatheter approach. With regards to his surgical fitness, he is a moderate risk candidate.  If there is compromise of aortic root anatomy/geometry, would offer them an open surgical approach.    Will arrange Carotid Duplex for alternative access site evaluation.     We will discuss this patient's case at our Valve Team meeting with representatives from Structural Heart and Cardiac Surgery. Our nurse navigators will contact patient with further diagnostic needs and formal plan.     Ortega Lerma MD, M.SC. PH.D, [facility]   Director, Cardiac Surgery [facility]  Cardiac Surgery, [facility] [place]  , [facility]    I,Dov Jack RN   am scribing for, and in the presence of Nicolas Lerma MD      I, Ortega Lerma MD, personally performed the services described in the documentation as scribed by Dov Jack RN   in my presence, and confirm it is both accurate and complete.     **Disclaimer: This note was dictated by speech recognition, and every effort has been made to prevent any error in transcription, however minor errors may be present**

## 2025-06-02 NOTE — PATIENT INSTRUCTIONS
Patient to plan aortic valve intervention in near future with Dr. Kris MD     Team will discuss case at our meeting next week and call with updates.     Encouraged to quit smoking- heart healthy education given today.     Continue same medications and treatments.   Patient educated on proper medication use.   Patient educated on risk factor modification.   Please bring any lab results from other providers / physicians to your next appointment.     Please bring all medicines, vitamins, and herbal supplements with you when you come to the office.     Prescriptions will not be filled unless you are compliant with your follow up appointments or have a follow up appointment scheduled as per instruction of your physician. Refills should be requested at the time of your visit.    Dov SAGE RN am scribing for and in the presence of Dr. Kris MD

## 2025-06-02 NOTE — PROGRESS NOTES
Referred by Donte Bush DO  Chief complaint:   Chief Complaint   Patient presents with    Consult    Valve Disorder        TAVR CLINIC  Transcatheter Aortic Valve Replacement Evaluation    Interventional Cardiologist: Blaine Ponce MD    Cardiothoracic Surgeon: Ortega Lerma MD    Nurse: Dov Jack RN    History of Present Illness  Alma Lane is a 75 y.o. year old female patient with history of Carotid Stenosis, Diabetes Mellitus, Hyperlipidemia, Hypertension, overweight, unfortunately a current smoker.   Referred by Dr. Eb MD for Severe Aortic Valve Stenosis.      Outpatient Medications:  Current Outpatient Medications   Medication Instructions    aspirin 81 mg chewable tablet 1 tablet, Daily    benzonatate (TESSALON) 100 mg, oral, 3 times daily PRN, Do not crush or chew.    Blood glucose monitoring meter Used to check sugar up to 6 times a day as needed.    blood sugar diagnostic (Blood Glucose Test) strip Used to test sugars up to 6 times daily as needed.    cholecalciferol (VITAMIN D-3) 125 mcg, Daily    cyanocobalamin (VITAMIN B-12) 100 mcg, Daily    lancets misc Use to test sugar up to 6 times daily as needed and directed    losartan (COZAAR) 50 mg, oral, 2 times daily    metoprolol tartrate (Lopressor) 50 mg tablet TAKE ONE TABLET BY MOUTH TWO TIMES A DAY    pantoprazole (ProtoNix) 20 mg EC tablet 1 po qd    pioglitazone (ACTOS) 15 mg, oral, Daily    rosuvastatin (CRESTOR) 20 mg, oral, Daily         Vitals:  Vitals:    06/02/25 1344   BP: 143/56   Pulse: 84   Temp: 36.4 °C (97.5 °F)   SpO2: 97%       Physical Exam:  General: NAD, well-appearing  HEENT: moist mucous membranes, no jaundice  Neck: No JVD, no carotid bruit  Lungs: CTA murtaza, no wheezing or rales  Cardiac: RRR, no murmurs  Abdomen: soft, non-tender, non-distended  Extremities: 2+ radial pulses, mild edema, palpable pulses  Skin: warm, dry, no wound  Neurologic: AAOx3,  no focal deficits      Assessment/Plan    Diagnoses and all orders for this visit:  Severe aortic stenosis  Primary hypertension  Body mass index (BMI) of 29.0 to 29.9 in adult  Chronic dyspnea  Former smoker      TAVR Workup    KCCQ Questionnaire  1  Heart failure affects different people in different ways. Some feel shortness of breath while others feel fatigue. Please indicate how much you are limited by heart failure (shortness of breath or fatigue) in your ability to do the following activities over the past 2 weeks.     A.) Showering/bathing  2. Quite a bit  B.) Walking 1 block on level ground 4. Slightly  C.) Hurrying or Jogging   5. Not at All    2.  Over the past 2 weeks, how many times did you have swelling in your feet, ankles or legs when you woke up in the morning? 5. Never    3.  Over the past 2 weeks, on average, how many times has fatigue limited your ability to do what you wanted? 2. Several times a day    4.  Over the past 2 weeks, on average, how many times has shortness of breath limited your ability to do what you wanted? 2. Several times a day    5.  Over the past 2 weeks, on average, how many times have you been forced to sleep sitting up in a chair or with at least 3 pillows to prop you up because of shortness of breath? Never    6. Over the past 2 weeks, how much has your heart failure limited your enjoyment of life? It has moderately limited my enjoyment of life    7. If you had to spend the rest of your life with your heart failure the way it is right now, how would you feel about this? 2. Mostly dissatisfied    8. How much does your heart failure affect your lifestyle? Please indicate how your heart failure may have limited your participation in the following activities over the past 2 weeks    A.)  Hobbies, recreational activities  6. Does not apply for other reasons    B.) Working or doing household chores  4. Slightly limited    C.) Visiting family or friends out of your home  4. Slightly limited     Five Meter Walk Test:  5.31 second     Short Term Surgical Risk (STS):  Isolated AVR:  Procedure Type: Isolated AVR  Perioperative Outcome Estimate %  Operative Mortality 4.96%  Morbidity & Mortality 14.4%  Stroke 2.26%  Renal Failure 3.63%  Reoperation 3.53%  Prolonged Ventilation 8.68%  Deep Sternal Wound Infection 0.064%  Long Hospital Stay (>14 days) 7.67%  Short Hospital Stay (<6 days)* 34.2%      LHC: 5/14/25:   1. Severe aortic valve stenosis.   2. Normal coronary arteries.    Surface Echo: 11/29/24:  1. The left ventricular systolic function is normal, with a visually estimated ejection fraction of 60-65%.  Moderate to severe aortic valve stenosis.  There is moderate aortic valve cusp calcification.  Moderate aortic valve regurgitation.  AoV Index- 0.27  AoV Area- 0.69cm2    TAVR CT Scan: 5/17/25:  1. Calcified trileaflet aortic valve. Aortic valve calcium score  1585  2. Aortic annulus  27 x 20mm  3. Moderate atherosclerosis of thoracic aorta without evidence of  aneurysm, dissection, or coarctation.  4. Infrarenal abdominal aorta aneurysm 30 mm.  5. Peripheral artery disease    Dental clearance: Patient follows with [facility]- will fax this visit to their office and ask for clearance.     Frailty:  The Frailty Index for Elders (FIFE)   Item                                                                                          Response      1.  Do you need help getting in or out of bed?                     NO  2.  Do you need help with washing or bathing?                  NO  3.  Without wanting to, have you lost or gained 10 pounds in the last 6 months?        NO  4.  Do you have tooth or mouth problems that make it hard to eat?             NO     5.  Do you have a poor appetite and quickly feel full when you eat?            NO      6.  Did your physical health or emotional problems interfere with your social activities?     YES   7.  Would you say your health is fair or poor?      YES  8.  Do you get tired  easily?                        YES  9.  Were you hospitalized in the last 3 months?                  NO  10. Did you visit an emergency room for a health problem in the past 3 months?     NO     Scoring:   A score of 0 indicates no frailty   A score of 1-3 indicates frailty risk   A score of 4 or greater indicates frailty        Patient relates a five month history of worsening fatigue, SOB, MARIE.  Denies overt orthopnea, PND, exertional CP.  Has occasional dizziness.  Denies syncope or presyncope.  Denies increased abdominal girth, edema.  Gradually doing less and less activity secondary to symptoms.      The overall decision regarding the best treatment for this condition is complex.  We discussed options of both surgical aortic valve replacement and transcatheter aortic valve replacement along with risks and benefits involved with both of them in detail.    We discussed all the risks associated with the procedure, including but not limited to stroke, MI, pericardial tamponade, vascular complications, infection and death were discussed with the patient. The risk of needing a permament pacemaker was also discussed in detail. The patient verbalized understanding and decided to proceed with the procedure.     Favor transcatheter approach. With regards to their surgical fitness, she is a moderate risk candidate.  If there is compromise of aortic root anatomy/geometry, would offer them an open surgical approach.   Will arrange Carotid Duplex for alternative access site evaluation.     We will discuss this patient's case at our Valve Team meeting with representatives from Structural Heart and Cardiac Surgery. Our nurse navigators will contact patient with further diagnostic needs and formal plan.     Blaine Ponce MD, Ph,D, [facility]  Interventional Cardiologist - [facility] [place]  Director of Structural and Valvular Heart Intervention - [facility]      I,Dov Jack RN   am scribing for, and in the presence of Dr. Kris MD.    I, Dr. Kris MD, personally performed the services described in the documentation as scribed by Dov Jack RN   in my presence, and confirm it is both accurate and complete.     **Disclaimer: This note was dictated by speech recognition, and every effort has been made to prevent any error in transcription, however minor errors may be present**

## 2025-06-03 ENCOUNTER — TELEPHONE (OUTPATIENT)
Dept: CARDIOLOGY | Facility: CLINIC | Age: 75
End: 2025-06-03
Payer: MEDICARE

## 2025-06-03 NOTE — TELEPHONE ENCOUNTER
Patient seen in office yesterday with Dr. Kris MD and discussed possible TAVR vs SAVR.     Patient updated on dental clearance needs.   States she sees [facility] in [place].   Fax #- [phone]    Will fax Dr. Ponce OV note to dentist and ask for clearance to proceed with Aortic Valve intervention in future.   This office fax # provided for their response.

## 2025-06-05 DIAGNOSIS — I10 HYPERTENSION, UNSPECIFIED TYPE: ICD-10-CM

## 2025-06-05 RX ORDER — METOPROLOL TARTRATE 50 MG/1
50 TABLET ORAL 2 TIMES DAILY
Qty: 180 TABLET | Refills: 1 | Status: SHIPPED | OUTPATIENT
Start: 2025-06-05

## 2025-06-05 NOTE — TELEPHONE ENCOUNTER
Received request for prescription refills for patient.   Patient follows with Dr. Parson    Request is for metoprolol tartrate(lopressor)  Is patient currently on medication yes    Last OV 5/8/2025  Next OV none scheduled yet, however pt is seeing specialist regarding valve and will have follow up thereafter, refill sent for 6 months    Pended for signing and sent to provider

## 2025-06-07 NOTE — PROGRESS NOTES
Valve and Structural Heart Multidisciplinary Meeting   This note reflects a summary of a case conference discussion between a multidisciplinary team of interventional cardiologists, cardiac surgeons, APPs, nurse navigators, and research team.  The suggestions and impressions in this note reflect group consensus opinion but do not substitute bedside evaluation and management by the primary team.     Attendees:  Dr. Pandey, Sherri Vasquez, Rosalie Alvarado, Rhonda De Santiago, Miguel Alaniz, Yadira Bui, Saundra Alford, Dr. Guadalupe (CT surgery fellow), Dr. Shabazz.  Dr. Astorga, Dr. Lerma, Dr. Vidal, Dr. Ponce, Dr. Buenrostro, Dr. Montoya    Alma Lane is a 75 y.o. year old female  Medical record number: [id number]    Referring Provider Dr. Parson    Brief Clinical Summary - clinical presentation/comorbidities:  Alma Lane is a 75 y.o. year old female patient with history of Carotid Stenosis, Diabetes Mellitus, Hyperlipidemia, Hypertension, overweight, unfortunately a current smoker.   Referred by Dr. Eb MD for Severe Aortic Valve Stenosis.  Valve and Structural Heart Work Up  KCCQ/Walk done  STS Isolated AVR  Procedure Type: Isolated AVR  Perioperative OutcomeEstimate %  Operative Mortality4.96%  -LHC 5/14/2025  -TTE 5/14/2025 EF 60-65%, moderate AI, DI 0.27, valve area 0.69,  -TAVR CT 5/17/2025 Aortic valve calcium score 1585   -Dental clearance: Patient follows with [facility]- will fax this visit to their office and ask for clearance.   -Moderate surgical risk, favor transcatheter  -Carotid US pending    Group consensus recommendation:   Carotid ultrasound pending. Imaging reviewed.   Borderline transfemoral access.  Team feels can still do RFP.  Area 391  chad 72 sinus 31 STJ 25 Evolut 26 FX. Plan to proceed with transfemoral TAVr.    To contact the  [facility] contact  Transfer Center or the office [phone].

## 2025-06-09 ENCOUNTER — CARDIOLOGY CONFERENCE (OUTPATIENT)
Dept: CARDIOLOGY | Facility: HOSPITAL | Age: 75
End: 2025-06-09
Payer: MEDICARE

## 2025-06-11 ENCOUNTER — HOSPITAL ENCOUNTER (EMERGENCY)
Facility: HOSPITAL | Age: 75
Discharge: HOME | End: 2025-06-11
Attending: STUDENT IN AN ORGANIZED HEALTH CARE EDUCATION/TRAINING PROGRAM
Payer: MEDICARE

## 2025-06-11 ENCOUNTER — APPOINTMENT (OUTPATIENT)
Dept: RADIOLOGY | Facility: HOSPITAL | Age: 75
End: 2025-06-11
Payer: MEDICARE

## 2025-06-11 ENCOUNTER — APPOINTMENT (OUTPATIENT)
Dept: CARDIOLOGY | Facility: HOSPITAL | Age: 75
End: 2025-06-11
Payer: MEDICARE

## 2025-06-11 ENCOUNTER — HOSPITAL ENCOUNTER (EMERGENCY)
Dept: CARDIOLOGY | Facility: HOSPITAL | Age: 75
Discharge: HOME | End: 2025-06-11
Payer: MEDICARE

## 2025-06-11 VITALS
DIASTOLIC BLOOD PRESSURE: 62 MMHG | RESPIRATION RATE: 18 BRPM | HEIGHT: 62 IN | HEART RATE: 69 BPM | OXYGEN SATURATION: 97 % | WEIGHT: 160 LBS | TEMPERATURE: 97 F | BODY MASS INDEX: 29.44 KG/M2 | SYSTOLIC BLOOD PRESSURE: 160 MMHG

## 2025-06-11 DIAGNOSIS — R10.9 FLANK PAIN: Primary | ICD-10-CM

## 2025-06-11 LAB
ALBUMIN SERPL BCP-MCNC: 4.1 G/DL (ref 3.4–5)
ALP SERPL-CCNC: 55 U/L (ref 33–136)
ALT SERPL W P-5'-P-CCNC: 11 U/L (ref 7–45)
ANION GAP SERPL CALC-SCNC: 11 MMOL/L (ref 10–20)
APPEARANCE UR: CLEAR
AST SERPL W P-5'-P-CCNC: 13 U/L (ref 9–39)
ATRIAL RATE: 65 BPM
ATRIAL RATE: 74 BPM
BASOPHILS # BLD AUTO: 0.05 X10*3/UL (ref 0–0.1)
BASOPHILS NFR BLD AUTO: 0.5 %
BILIRUB SERPL-MCNC: 0.4 MG/DL (ref 0–1.2)
BILIRUB UR STRIP.AUTO-MCNC: NEGATIVE MG/DL
BUN SERPL-MCNC: 20 MG/DL (ref 6–23)
CALCIUM SERPL-MCNC: 9.4 MG/DL (ref 8.6–10.3)
CARDIAC TROPONIN I PNL SERPL HS: 11 NG/L (ref 0–13)
CARDIAC TROPONIN I PNL SERPL HS: 11 NG/L (ref 0–13)
CHLORIDE SERPL-SCNC: 103 MMOL/L (ref 98–107)
CO2 SERPL-SCNC: 26 MMOL/L (ref 21–32)
COLOR UR: ABNORMAL
CREAT SERPL-MCNC: 1.52 MG/DL (ref 0.5–1.05)
EGFRCR SERPLBLD CKD-EPI 2021: 35 ML/MIN/1.73M*2
EOSINOPHIL # BLD AUTO: 0.16 X10*3/UL (ref 0–0.4)
EOSINOPHIL NFR BLD AUTO: 1.7 %
ERYTHROCYTE [DISTWIDTH] IN BLOOD BY AUTOMATED COUNT: 14.7 % (ref 11.5–14.5)
GLUCOSE SERPL-MCNC: 121 MG/DL (ref 74–99)
GLUCOSE UR STRIP.AUTO-MCNC: NORMAL MG/DL
HCT VFR BLD AUTO: 36 % (ref 36–46)
HGB BLD-MCNC: 11.5 G/DL (ref 12–16)
IMM GRANULOCYTES # BLD AUTO: 0.02 X10*3/UL (ref 0–0.5)
IMM GRANULOCYTES NFR BLD AUTO: 0.2 % (ref 0–0.9)
KETONES UR STRIP.AUTO-MCNC: NEGATIVE MG/DL
LEUKOCYTE ESTERASE UR QL STRIP.AUTO: NEGATIVE
LIPASE SERPL-CCNC: 16 U/L (ref 9–82)
LYMPHOCYTES # BLD AUTO: 3.71 X10*3/UL (ref 0.8–3)
LYMPHOCYTES NFR BLD AUTO: 40.5 %
MCH RBC QN AUTO: 30.7 PG (ref 26–34)
MCHC RBC AUTO-ENTMCNC: 31.9 G/DL (ref 32–36)
MCV RBC AUTO: 96 FL (ref 80–100)
MONOCYTES # BLD AUTO: 0.8 X10*3/UL (ref 0.05–0.8)
MONOCYTES NFR BLD AUTO: 8.7 %
NEUTROPHILS # BLD AUTO: 4.41 X10*3/UL (ref 1.6–5.5)
NEUTROPHILS NFR BLD AUTO: 48.4 %
NITRITE UR QL STRIP.AUTO: NEGATIVE
NRBC BLD-RTO: 0 /100 WBCS (ref 0–0)
P AXIS: 47 DEGREES
P AXIS: 57 DEGREES
P OFFSET: 194 MS
P OFFSET: 195 MS
P ONSET: 147 MS
P ONSET: 151 MS
PH UR STRIP.AUTO: 6 [PH]
PLATELET # BLD AUTO: 258 X10*3/UL (ref 150–450)
POTASSIUM SERPL-SCNC: 4.5 MMOL/L (ref 3.5–5.3)
PR INTERVAL: 144 MS
PR INTERVAL: 158 MS
PROT SERPL-MCNC: 7.6 G/DL (ref 6.4–8.2)
PROT UR STRIP.AUTO-MCNC: NEGATIVE MG/DL
Q ONSET: 223 MS
Q ONSET: 226 MS
QRS COUNT: 11 BEATS
QRS COUNT: 12 BEATS
QRS DURATION: 70 MS
QRS DURATION: 72 MS
QT INTERVAL: 392 MS
QT INTERVAL: 408 MS
QTC CALCULATION(BAZETT): 424 MS
QTC CALCULATION(BAZETT): 435 MS
QTC FREDERICIA: 418 MS
QTC FREDERICIA: 420 MS
R AXIS: 46 DEGREES
R AXIS: 51 DEGREES
RBC # BLD AUTO: 3.75 X10*6/UL (ref 4–5.2)
RBC # UR STRIP.AUTO: NEGATIVE MG/DL
SODIUM SERPL-SCNC: 135 MMOL/L (ref 136–145)
SP GR UR STRIP.AUTO: >1.05
T AXIS: 65 DEGREES
T AXIS: 69 DEGREES
T OFFSET: 419 MS
T OFFSET: 430 MS
UROBILINOGEN UR STRIP.AUTO-MCNC: NORMAL MG/DL
VENTRICULAR RATE: 65 BPM
VENTRICULAR RATE: 74 BPM
WBC # BLD AUTO: 9.2 X10*3/UL (ref 4.4–11.3)

## 2025-06-11 PROCEDURE — 96375 TX/PRO/DX INJ NEW DRUG ADDON: CPT

## 2025-06-11 PROCEDURE — 2500000004 HC RX 250 GENERAL PHARMACY W/ HCPCS (ALT 636 FOR OP/ED): Performed by: STUDENT IN AN ORGANIZED HEALTH CARE EDUCATION/TRAINING PROGRAM

## 2025-06-11 PROCEDURE — 84484 ASSAY OF TROPONIN QUANT: CPT | Performed by: STUDENT IN AN ORGANIZED HEALTH CARE EDUCATION/TRAINING PROGRAM

## 2025-06-11 PROCEDURE — 71275 CT ANGIOGRAPHY CHEST: CPT

## 2025-06-11 PROCEDURE — 71045 X-RAY EXAM CHEST 1 VIEW: CPT | Performed by: RADIOLOGY

## 2025-06-11 PROCEDURE — 81003 URINALYSIS AUTO W/O SCOPE: CPT | Performed by: STUDENT IN AN ORGANIZED HEALTH CARE EDUCATION/TRAINING PROGRAM

## 2025-06-11 PROCEDURE — 2550000001 HC RX 255 CONTRASTS: Performed by: STUDENT IN AN ORGANIZED HEALTH CARE EDUCATION/TRAINING PROGRAM

## 2025-06-11 PROCEDURE — 83690 ASSAY OF LIPASE: CPT | Performed by: STUDENT IN AN ORGANIZED HEALTH CARE EDUCATION/TRAINING PROGRAM

## 2025-06-11 PROCEDURE — 93005 ELECTROCARDIOGRAM TRACING: CPT

## 2025-06-11 PROCEDURE — 71045 X-RAY EXAM CHEST 1 VIEW: CPT

## 2025-06-11 PROCEDURE — 96374 THER/PROPH/DIAG INJ IV PUSH: CPT

## 2025-06-11 PROCEDURE — 80053 COMPREHEN METABOLIC PANEL: CPT | Performed by: STUDENT IN AN ORGANIZED HEALTH CARE EDUCATION/TRAINING PROGRAM

## 2025-06-11 PROCEDURE — 85025 COMPLETE CBC W/AUTO DIFF WBC: CPT | Performed by: STUDENT IN AN ORGANIZED HEALTH CARE EDUCATION/TRAINING PROGRAM

## 2025-06-11 PROCEDURE — 99285 EMERGENCY DEPT VISIT HI MDM: CPT | Mod: 25 | Performed by: STUDENT IN AN ORGANIZED HEALTH CARE EDUCATION/TRAINING PROGRAM

## 2025-06-11 RX ORDER — MORPHINE SULFATE 15 MG/1
7.5 TABLET ORAL EVERY 6 HOURS PRN
Qty: 6 TABLET | Refills: 0 | Status: SHIPPED | OUTPATIENT
Start: 2025-06-11 | End: 2025-06-11 | Stop reason: WASHOUT

## 2025-06-11 RX ORDER — NALOXONE HYDROCHLORIDE 4 MG/.1ML
4 SPRAY NASAL AS NEEDED
Qty: 1 EACH | Refills: 0 | Status: SHIPPED | OUTPATIENT
Start: 2025-06-11

## 2025-06-11 RX ORDER — METOCLOPRAMIDE HYDROCHLORIDE 5 MG/ML
10 INJECTION INTRAMUSCULAR; INTRAVENOUS ONCE
Status: COMPLETED | OUTPATIENT
Start: 2025-06-11 | End: 2025-06-11

## 2025-06-11 RX ORDER — METOCLOPRAMIDE 10 MG/1
10 TABLET ORAL EVERY 6 HOURS
Qty: 16 TABLET | Refills: 0 | Status: SHIPPED | OUTPATIENT
Start: 2025-06-11 | End: 2025-06-15

## 2025-06-11 RX ORDER — OXYCODONE HYDROCHLORIDE 5 MG/1
5 TABLET ORAL EVERY 8 HOURS PRN
Qty: 9 TABLET | Refills: 0 | Status: SHIPPED | OUTPATIENT
Start: 2025-06-11 | End: 2025-06-14

## 2025-06-11 RX ORDER — MORPHINE SULFATE 4 MG/ML
4 INJECTION, SOLUTION INTRAMUSCULAR; INTRAVENOUS ONCE
Status: COMPLETED | OUTPATIENT
Start: 2025-06-11 | End: 2025-06-11

## 2025-06-11 RX ADMIN — MORPHINE SULFATE 4 MG: 4 INJECTION, SOLUTION INTRAMUSCULAR; INTRAVENOUS at 15:37

## 2025-06-11 RX ADMIN — IOHEXOL 100 ML: 350 INJECTION, SOLUTION INTRAVENOUS at 16:18

## 2025-06-11 RX ADMIN — METOCLOPRAMIDE 10 MG: 5 INJECTION, SOLUTION INTRAMUSCULAR; INTRAVENOUS at 15:36

## 2025-06-11 ASSESSMENT — PAIN - FUNCTIONAL ASSESSMENT
PAIN_FUNCTIONAL_ASSESSMENT: 0-10
PAIN_FUNCTIONAL_ASSESSMENT: 0-10

## 2025-06-11 ASSESSMENT — PAIN DESCRIPTION - PAIN TYPE: TYPE: ACUTE PAIN

## 2025-06-11 ASSESSMENT — PAIN DESCRIPTION - PROGRESSION: CLINICAL_PROGRESSION: NOT CHANGED

## 2025-06-11 ASSESSMENT — LIFESTYLE VARIABLES
EVER FELT BAD OR GUILTY ABOUT YOUR DRINKING: NO
HAVE YOU EVER FELT YOU SHOULD CUT DOWN ON YOUR DRINKING: NO
TOTAL SCORE: 0
EVER HAD A DRINK FIRST THING IN THE MORNING TO STEADY YOUR NERVES TO GET RID OF A HANGOVER: NO
HAVE PEOPLE ANNOYED YOU BY CRITICIZING YOUR DRINKING: NO

## 2025-06-11 ASSESSMENT — PAIN SCALES - GENERAL
PAINLEVEL_OUTOF10: 8
PAINLEVEL_OUTOF10: 0 - NO PAIN

## 2025-06-11 ASSESSMENT — PAIN DESCRIPTION - FREQUENCY: FREQUENCY: CONSTANT/CONTINUOUS

## 2025-06-11 ASSESSMENT — PAIN DESCRIPTION - ORIENTATION: ORIENTATION: LEFT

## 2025-06-11 ASSESSMENT — PAIN DESCRIPTION - LOCATION: LOCATION: BACK

## 2025-06-11 ASSESSMENT — PAIN DESCRIPTION - ONSET: ONSET: SUDDEN

## 2025-06-11 ASSESSMENT — PAIN DESCRIPTION - DESCRIPTORS: DESCRIPTORS: ACHING

## 2025-06-11 NOTE — ED PROVIDER NOTES
HPI   Chief Complaint   Patient presents with    Flank Pain     Left flank pain x 1 day.       Patient is a 75-year-old female with history of hypertension, hyperlipidemia, COPD on oxygen, type 2 diabetes, CKD, aortic stenosis who presents for left low back/flank pain.  Patient states that started last night.  States it hurts when she takes a deep breath.  Also Dors is chills as well as some mild nausea.  No pain in the front of her chest, fevers, cough, runny nose, sore throat, vomiting, diarrhea, abdominal pain.  Does not urinary frequency with decreased urine output in each duration, no hematuria or dysuria.  Endorses tobacco, no alcohol or drugs.  No history of MI, CVA, DVT or PE.  No cardiac stents.  Patient has a upcoming TAVR planned, no date set. No shortness of breath.              Patient History   Medical History[1]  Surgical History[2]  Family History[3]  Social History[4]    Physical Exam   ED Triage Vitals [06/11/25 1510]   Temperature Heart Rate Respirations BP   37.1 °C (98.8 °F) 83 20 156/69      Pulse Ox Temp Source Heart Rate Source Patient Position   96 % Temporal Monitor Sitting      BP Location FiO2 (%)     Right arm --       Physical Exam  Constitutional:       General: She is not in acute distress.  HENT:      Head: Normocephalic.   Eyes:      Extraocular Movements: Extraocular movements intact.      Conjunctiva/sclera: Conjunctivae normal.      Pupils: Pupils are equal, round, and reactive to light.   Cardiovascular:      Rate and Rhythm: Normal rate and regular rhythm.      Pulses: Normal pulses.      Heart sounds: Normal heart sounds.   Pulmonary:      Effort: Pulmonary effort is normal.      Comments: Slightly decreased lung sounds in left posterior lower lobe  Abdominal:      General: There is no distension.      Palpations: Abdomen is soft. There is no mass.      Tenderness: There is no abdominal tenderness. There is left CVA tenderness. There is no right CVA tenderness or guarding.    Musculoskeletal:         General: No deformity.      Cervical back: Normal range of motion and neck supple.      Right lower leg: No edema.      Left lower leg: No edema.   Skin:     General: Skin is warm and dry.      Findings: No lesion or rash.   Neurological:      General: No focal deficit present.      Mental Status: She is alert and oriented to person, place, and time. Mental status is at baseline.      Cranial Nerves: No cranial nerve deficit.      Sensory: No sensory deficit.      Motor: No weakness.   Psychiatric:         Mood and Affect: Mood normal.           ED Course & MDM   Diagnoses as of 06/11/25 1757   Flank pain                 No data recorded     Kade Coma Scale Score: 15 (06/11/25 1514 : Rayne Coombs RN)                           Medical Decision Making  EKG on my interpretation: Rate 74, rhythm regular, axis normal, , QRS 72, QTc 435, T waves: Unremarkable, ST segments: No elevations or depressions, interpretation: Normal sinus rhythm, no STEMI    EKG on my interpretation: Rate 65, rhythm regular, axis normal, , QRS 70, QTc 424, T waves: Unremarkable, ST segments: No elevations or depressions, interpretation: Normal sinus rhythm, no STEMI    Patient is a 75-year-old female with above-stated past medical history who presents for flank pain.  Patient's EKG is nonischemic, her troponins are negative x 2, have low suspicion for ACS.  Lipase is not consistent with pancreatitis.  Patient's CMP is grossly unremarkable.  CBC shows no leukocytosis, her anemia is near her baseline.  Chest x-ray on my review did not show signs of pneumonia or pneumothorax.  CT angio was ordered given patient's aortic history, I reviewed this and did not note any signs of dissection, this was confirmed by radiology.  No sign of pneumonia.  No sign of other acute findings.  Patient is clinically well-appearing nontoxic.  Her pain improved with pain medications.  Urinalysis is not consistent with urinary  tract infection.  No sign of pyelonephritis on her CT scan.  She states she feels improved.  She feels comfortable turning home.  I believe this is reasonable.  She was given a short course of pain medications.  She may be suffering from shingles possibly that has not erupted into a rash versus musculoskeletal pain.  I do not believe the patient requires admission.  She feels comfortable trying home.  I discussed strict return precautions with her as well as follow-up instructions.  She stated understanding and agreement.  Patient was discharged home in the care of her family.    Disclaimer: This note was dictated using speech recognition software. Minor errors in transcription may be present. Please call if questions.     Torrey Lane MD  [facility]  Contact on [employer]        Problems Addressed:  Flank pain: acute illness or injury    Amount and/or Complexity of Data Reviewed  Labs: ordered.  Radiology: ordered and independent interpretation performed.  ECG/medicine tests: ordered and independent interpretation performed.        Procedure  Procedures         [1]   Past Medical History:  Diagnosis Date    Breast cancer     Chronic kidney disease     Chronic obstructive pulmonary disease with (acute) exacerbation (Multi) 04/15/2022    Acute exacerbation of chronic obstructive pulmonary disease (COPD)    Diabetes mellitus (Multi)     Hx antineoplastic chemo     Hypertension     Personal history of diseases of the blood and blood-forming organs and certain disorders involving the immune mechanism     History of anemia    Personal history of irradiation     Personal history of other diseases of the musculoskeletal system and connective tissue     History of arthritis    Personal history of other diseases of the respiratory system     History of chronic obstructive lung disease    Personal history of other diseases of the respiratory system     History of upper respiratory infection    Personal history  of other diseases of the respiratory system     History of acute pharyngitis    Personal history of other diseases of the respiratory system 10/25/2016    History of acute pharyngitis    Personal history of other diseases of the respiratory system     History of pulmonary emphysema    Personal history of other endocrine, nutritional and metabolic disease     History of hyperlipidemia    Personal history of other endocrine, nutritional and metabolic disease     History of diabetes mellitus    Personal history of other specified conditions     History of chest pain    Shortness of breath     Shortness of breath at rest   [2]   Past Surgical History:  Procedure Laterality Date    APPENDECTOMY  10/25/2016    Appendectomy    BREAST LUMPECTOMY      BREAST SURGERY  10/25/2016    Breast Surgery    CARDIAC CATHETERIZATION Left 5/14/2025    Procedure: LHC, With LV;  Surgeon: Blaine Ponce MD;  Location: [facility];  Service: Cardiovascular;  Laterality: Left;  holding    COLONOSCOPY  10/25/2016    Complete Colonoscopy    CT ANGIO NECK  10/04/2021    CT NECK ANGIO W AND WO IV CONTRAST 10/4/2021 [facility]    MOUTH SURGERY  10/25/2016    Oral Surgery Tooth Extraction    OTHER SURGICAL HISTORY  01/21/2022    Ganglion cyst excision   [3]   Family History  Problem Relation Name Age of Onset    No Known Problems Mother      Cancer Father      Diabetes Father     [4]   Social History  Tobacco Use    Smoking status: Every Day     Current packs/day: 0.50     Average packs/day: 0.5 packs/day for 56.4 years (28.2 ttl pk-yrs)     Types: Cigarettes     Start date: 1969     Passive exposure: Past    Smokeless tobacco: Never    Tobacco comments:     Last smoked Dec 31, 2024 at 11:30   Substance Use Topics    Alcohol use: Not Currently    Drug use: Not Currently        Torrey Lane MD  06/11/25 1800

## 2025-06-12 LAB — HOLD SPECIMEN: NORMAL

## 2025-06-13 ENCOUNTER — APPOINTMENT (OUTPATIENT)
Dept: PRIMARY CARE | Facility: CLINIC | Age: 75
End: 2025-06-13
Payer: MEDICARE

## 2025-06-17 ENCOUNTER — APPOINTMENT (OUTPATIENT)
Dept: HEMATOLOGY/ONCOLOGY | Facility: CLINIC | Age: 75
End: 2025-06-17
Payer: MEDICARE

## 2025-06-18 ENCOUNTER — APPOINTMENT (OUTPATIENT)
Dept: PRIMARY CARE | Facility: CLINIC | Age: 75
End: 2025-06-18
Payer: MEDICARE

## 2025-06-18 VITALS
DIASTOLIC BLOOD PRESSURE: 72 MMHG | OXYGEN SATURATION: 95 % | WEIGHT: 157 LBS | SYSTOLIC BLOOD PRESSURE: 149 MMHG | HEIGHT: 62 IN | HEART RATE: 102 BPM | BODY MASS INDEX: 28.89 KG/M2

## 2025-06-18 DIAGNOSIS — E55.9 VITAMIN D DEFICIENCY: ICD-10-CM

## 2025-06-18 DIAGNOSIS — I10 PRIMARY HYPERTENSION: ICD-10-CM

## 2025-06-18 DIAGNOSIS — E78.2 MIXED HYPERLIPIDEMIA: ICD-10-CM

## 2025-06-18 DIAGNOSIS — N18.4 TYPE 2 DIABETES MELLITUS WITH STAGE 4 CHRONIC KIDNEY DISEASE, WITHOUT LONG-TERM CURRENT USE OF INSULIN (MULTI): ICD-10-CM

## 2025-06-18 DIAGNOSIS — Z79.899 DRUG THERAPY: ICD-10-CM

## 2025-06-18 DIAGNOSIS — J02.9 ACUTE PHARYNGITIS, UNSPECIFIED ETIOLOGY: ICD-10-CM

## 2025-06-18 DIAGNOSIS — F32.A ANXIETY AND DEPRESSION: ICD-10-CM

## 2025-06-18 DIAGNOSIS — Z00.00 ROUTINE GENERAL MEDICAL EXAMINATION AT HEALTH CARE FACILITY: Primary | ICD-10-CM

## 2025-06-18 DIAGNOSIS — Z17.1 MALIGNANT NEOPLASM OF RIGHT BREAST IN FEMALE, ESTROGEN RECEPTOR NEGATIVE, UNSPECIFIED SITE OF BREAST: ICD-10-CM

## 2025-06-18 DIAGNOSIS — K21.9 GASTROESOPHAGEAL REFLUX DISEASE, UNSPECIFIED WHETHER ESOPHAGITIS PRESENT: ICD-10-CM

## 2025-06-18 DIAGNOSIS — C50.111 MALIGNANT NEOPLASM OF CENTRAL PORTION OF RIGHT FEMALE BREAST, UNSPECIFIED ESTROGEN RECEPTOR STATUS: ICD-10-CM

## 2025-06-18 DIAGNOSIS — F41.9 ANXIETY AND DEPRESSION: ICD-10-CM

## 2025-06-18 DIAGNOSIS — J44.9 CHRONIC OBSTRUCTIVE PULMONARY DISEASE, UNSPECIFIED COPD TYPE (MULTI): ICD-10-CM

## 2025-06-18 DIAGNOSIS — C50.911 MALIGNANT NEOPLASM OF RIGHT BREAST IN FEMALE, ESTROGEN RECEPTOR NEGATIVE, UNSPECIFIED SITE OF BREAST: ICD-10-CM

## 2025-06-18 DIAGNOSIS — Z13.9 SCREENING FOR CONDITION: ICD-10-CM

## 2025-06-18 DIAGNOSIS — E11.22 TYPE 2 DIABETES MELLITUS WITH STAGE 4 CHRONIC KIDNEY DISEASE, WITHOUT LONG-TERM CURRENT USE OF INSULIN (MULTI): ICD-10-CM

## 2025-06-18 DIAGNOSIS — N28.9 RENAL INSUFFICIENCY: ICD-10-CM

## 2025-06-18 RX ORDER — AMOXICILLIN 875 MG/1
875 TABLET, COATED ORAL 2 TIMES DAILY
Qty: 20 TABLET | Refills: 0 | Status: SHIPPED | OUTPATIENT
Start: 2025-06-18 | End: 2025-06-28

## 2025-06-18 RX ORDER — PIOGLITAZONE 30 MG/1
TABLET ORAL
Start: 2025-06-18

## 2025-06-18 ASSESSMENT — ACTIVITIES OF DAILY LIVING (ADL)
DRESSING: INDEPENDENT
MANAGING_FINANCES: INDEPENDENT
BATHING: INDEPENDENT
DOING_HOUSEWORK: INDEPENDENT
TAKING_MEDICATION: INDEPENDENT
GROCERY_SHOPPING: INDEPENDENT

## 2025-06-18 ASSESSMENT — ENCOUNTER SYMPTOMS
OCCASIONAL FEELINGS OF UNSTEADINESS: 0
LOSS OF SENSATION IN FEET: 0
DEPRESSION: 0

## 2025-06-18 ASSESSMENT — PATIENT HEALTH QUESTIONNAIRE - PHQ9
1. LITTLE INTEREST OR PLEASURE IN DOING THINGS: NOT AT ALL
2. FEELING DOWN, DEPRESSED OR HOPELESS: NOT AT ALL
SUM OF ALL RESPONSES TO PHQ9 QUESTIONS 1 AND 2: 0

## 2025-06-18 NOTE — PATIENT INSTRUCTIONS
Patient Discussion/Summary     Annual preventative with [facility] [place].      ----    screen for hepatitis C reactive.      Screening for colon cancer: Next colonoscopy due around 2029.    Screening for breast cancer, history of breast cancer, managed by Humberto Tee and Aleisha. -->> f/u as planned.     Immunization counseling. Tdap was May 2025. Up-to-date on annual flu shot, latest COVID booster, pneumonia, RSV.  Is due for shingles series. -->>  Check with your pharmacy to stay up-to-date on your immunizations.    ---------------------     Overweight, BMI 28, about 8 pounds over the last year. -->> Continue to limit simple carbohydrates like bread, pasta, potatoes, rice, sugars in general.     Smoking about 1/2 ppd. Improved from a pack or more daily -->> keep up the excellent work!  Of course I do advise you to completely quit.     Cardiac CT calcium scoring result was a zero, excellent. Pt does have h/o carotid artery stenosis, coronary artery disease/atherosclerosis, holosystolic murmur.  They are planning surgery for carotid artery stenosis.-->> Follow-up with vascular surgery as well as cardiology/Dr. Parson.        Regarding previous labs:    -  Type 2 diabetes with hypoglycemia, A1c is 6.9, 7.0, 7.3, 6.5, 7.2, 7.7, 7.0, 6.8, 6.4, 6.6, 6.7, 6.4, 5.8, 5.3, 6.1.  Was getting hypoglycemic episodes so cardiology referred patient to endocrinology/Dr. Boston.  They discontinued glipizide and sounds like they increase the Actos to 30 mg daily. -->>  Follow-up with endocrinology as planned.  Am trying to order glucometer to hopefully be covered on patient's insurance.  Any further refills you will need to get from endocrinology.    - History of hypochromic anemia, iron deficiency.  Slightly anemic this time, not much change from last labs on the chart.  Last percent saturation 16%.  B12 and folic acid within normal limits.  Patient is eating more servings of iron rich foods. -->>  Keep up the good work.  Will recheck with  next annual labs.    - Renal insufficiency/CKD estimated GFR 34, was 40, 26, 32, 37,  34, 39, 35, 35, 34, 36, . Seeing Dr. Garcia. -->> f/u with nephrology as planned. Looks like patient is getting labs with nephrology every 4 months. We will try to synchronize our labs around december with nephrology labs.    - Hyperlipidemia, LDL 18, on rosuvastatin/Crestor 20 mg daily along with coenzyme Q 10 over-the-counter. -->> Continue current treatment. Will recheck with next annual labs.    - Vitamin D deficiency, 85, was 68, 63, 38, 37, 57, 54, 60. Goal is .  Is on daily vitamin D plus an extra dose on Mondays and Fridays, we believe it is 1000 units.  -->>  Continue current dosing.  Rechecking with next annual labs summer 2025.      Hypertension, blood pressure borderline today. In general numbers pretty good on chart. -->> Follow-up with cardiology as planned.      COPD, history of AECOPD April 2025.  Albuterol inhaler maybe once or sometimes twice a week.      Anxiety and depression. Patient does well without meds, with the assistance of her cat. The cat helps her perform activities of daily living without getting as stressed. Patient is definitely calmer and less stressed with cat around. -->> We will sign papers as needed for landlord stating patient has a support animal. Or could write a letter if needed.     GERD. Doing well since she restarted the pantoprazole. -->> continue pantoprazole 20 mg daily.     Pharyngitis, started 3 days ago with a sore throat.  Pretty much consistent all day long.  Having some trouble swallowing.  No sinus symptoms. -->>  Will prescribe amoxicillin for 10 days.    Left-sided flank pain, was getting pretty bad for 3 to 4 days so went to emergency room, they did not come up with any diagnosis, urinalysis and CT of chest abdomen and pelvis was negative for any acute problems.  It resolved after another 3 to 4 days.      - Patient will return in about 12 months for annual Medicare  wellness and annual preventative visit.  We will review labs on the chart and order at that time if anything appears needed.

## 2025-06-18 NOTE — PROGRESS NOTES
"Subjective   Patient ID: Alma Lane is a 75 y.o. female who presents for MCW / Sore Throat (Pt in today for her Medicare Wellness / c/o sore throat.).    Review of Systems  Denies N/V/D/C, no HA/S/V, denies rashes/lesions, no CP/SOB. Denies fevers/chills.  All other systems were negative.     Objective   /72 (BP Location: Left arm, Patient Position: Sitting, BP Cuff Size: Large adult)   Pulse 102   Ht 1.575 m (5' 2\")   Wt 71.2 kg (157 lb)   SpO2 95%   BMI 28.72 kg/m²     Physical Exam  Gen: NAD  eyes: EOMI, PERRLA  ENT: hearing grossly intact, no nasal discharge  resp: CTABL, without R/R  heart: RRR, Holosystolic murmur  GI: abd: S/ND/NT, BS+  lymph: no axillary, cervical, supraclavicular lymphadenopathy noted   MS: gait grossly WNL,  derm: no rashes or lesions noted  neuro: CN II-XII grossly intact  psych: A&Ox3    Assessment/Plan   Problem List Items Addressed This Visit           ICD-10-CM    Drug therapy Z79.899    Vitamin D deficiency E55.9    Hyperlipidemia E78.5    Renal insufficiency N28.9    Hypertension I10    Anxiety and depression F41.9, F32.A    Chronic obstructive pulmonary disease (Multi) J44.9    Malignant neoplasm of central portion of right female breast, unspecified estrogen receptor status C50.111    Type 2 diabetes mellitus with stage 4 chronic kidney disease, without long-term current use of insulin (Multi) E11.22, N18.4    Relevant Medications    pioglitazone (Actos) 30 mg tablet    Acute pharyngitis J02.9    Relevant Medications    amoxicillin (Amoxil) 875 mg tablet    Screening for condition Z13.9    Gastroesophageal reflux disease K21.9     Other Visit Diagnoses         Codes      Routine general medical examination at health care facility    -  Primary Z00.00    Relevant Orders    1 Year Follow Up In Primary Care - Wellness Exam      Malignant neoplasm of right breast in female, estrogen receptor negative, unspecified site of breast     C50.911, Z17.1            Tobacco " cessation counseling provided x 4 min       Patient Discussion/Summary     Annual preventative with [facility].      ----    screen for hepatitis C reactive.      Screening for colon cancer: Next colonoscopy due around 2029.    Screening for breast cancer, history of breast cancer, managed by Humberto Tee and Aleisha. -->> f/u as planned.     Immunization counseling. Tdap was May 2025. Up-to-date on annual flu shot, latest COVID booster, pneumonia, RSV.  Is due for shingles series. -->>  Check with your pharmacy to stay up-to-date on your immunizations.    ---------------------     Overweight, BMI 28, about 8 pounds over the last year. -->> Continue to limit simple carbohydrates like bread, pasta, potatoes, rice, sugars in general.     Smoking about 1/2 ppd. Improved from a pack or more daily -->> keep up the excellent work!  Of course I do advise you to completely quit.     Cardiac CT calcium scoring result was a zero, excellent. Pt does have h/o carotid artery stenosis, coronary artery disease/atherosclerosis, holosystolic murmur.  They are planning surgery for carotid artery stenosis.-->> Follow-up with vascular surgery as well as cardiology/Dr. Parson.        Regarding previous labs:    -  Type 2 diabetes with hypoglycemia, A1c is 6.9, 7.0, 7.3, 6.5, 7.2, 7.7, 7.0, 6.8, 6.4, 6.6, 6.7, 6.4, 5.8, 5.3, 6.1.  Was getting hypoglycemic episodes so cardiology referred patient to endocrinology/Dr. Boston.  They discontinued glipizide and sounds like they increase the Actos to 30 mg daily. -->>  Follow-up with endocrinology as planned.  Am trying to order glucometer to hopefully be covered on patient's insurance.  Any further refills you will need to get from endocrinology.    - History of hypochromic anemia, iron deficiency.  Slightly anemic this time, not much change from last labs on the chart.  Last percent saturation 16%.  B12 and folic acid within normal limits.  Patient is eating more servings of iron rich foods. -->>   Keep up the good work.  Will recheck with next annual labs.    - Renal insufficiency/CKD estimated GFR 34, was 40, 26, 32, 37,  34, 39, 35, 35, 34, 36, . Seeing Dr. Garcia. -->> f/u with nephrology as planned. Looks like patient is getting labs with nephrology every 4 months. We will try to synchronize our labs around december with nephrology labs.    - Hyperlipidemia, LDL 18, on rosuvastatin/Crestor 20 mg daily along with coenzyme Q 10 over-the-counter. -->> Continue current treatment. Will recheck with next annual labs.    - Vitamin D deficiency, 85, was 68, 63, 38, 37, 57, 54, 60. Goal is .  Is on daily vitamin D plus an extra dose on Mondays and Fridays, we believe it is 1000 units.  -->>  Continue current dosing.  Rechecking with next annual labs summer 2025.      Hypertension, blood pressure borderline today. In general numbers pretty good on chart. -->> Follow-up with cardiology as planned.      COPD, history of AECOPD April 2025.  Albuterol inhaler maybe once or sometimes twice a week.      Anxiety and depression. Patient does well without meds, with the assistance of her cat. The cat helps her perform activities of daily living without getting as stressed. Patient is definitely calmer and less stressed with cat around. -->> We will sign papers as needed for landlord stating patient has a support animal. Or could write a letter if needed.     GERD. Doing well since she restarted the pantoprazole. -->> continue pantoprazole 20 mg daily.     Pharyngitis, started 3 days ago with a sore throat.  Pretty much consistent all day long.  Having some trouble swallowing.  No sinus symptoms. -->>  Will prescribe amoxicillin for 10 days.    Left-sided flank pain, was getting pretty bad for 3 to 4 days so went to emergency room, they did not come up with any diagnosis, urinalysis and CT of chest abdomen and pelvis was negative for any acute problems.  It resolved after another 3 to 4 days.      - Patient will return  in about 12 months for annual Medicare wellness and annual preventative visit.  We will review labs on the chart and order at that time if anything appears needed.

## 2025-06-21 ENCOUNTER — APPOINTMENT (OUTPATIENT)
Dept: RADIOLOGY | Facility: HOSPITAL | Age: 75
End: 2025-06-21
Payer: MEDICARE

## 2025-06-21 ENCOUNTER — HOSPITAL ENCOUNTER (EMERGENCY)
Facility: HOSPITAL | Age: 75
Discharge: HOME | End: 2025-06-21
Payer: MEDICARE

## 2025-06-21 VITALS
DIASTOLIC BLOOD PRESSURE: 71 MMHG | TEMPERATURE: 98.1 F | HEART RATE: 77 BPM | WEIGHT: 160 LBS | SYSTOLIC BLOOD PRESSURE: 164 MMHG | BODY MASS INDEX: 29.44 KG/M2 | HEIGHT: 62 IN | RESPIRATION RATE: 18 BRPM | OXYGEN SATURATION: 99 %

## 2025-06-21 DIAGNOSIS — L03.113 CELLULITIS OF RIGHT HAND: Primary | ICD-10-CM

## 2025-06-21 LAB
ANION GAP SERPL CALC-SCNC: 12 MMOL/L (ref 10–20)
BASOPHILS # BLD AUTO: 0.02 X10*3/UL (ref 0–0.1)
BASOPHILS NFR BLD AUTO: 0.2 %
BUN SERPL-MCNC: 25 MG/DL (ref 6–23)
CALCIUM SERPL-MCNC: 9.5 MG/DL (ref 8.6–10.3)
CHLORIDE SERPL-SCNC: 102 MMOL/L (ref 98–107)
CO2 SERPL-SCNC: 24 MMOL/L (ref 21–32)
CREAT SERPL-MCNC: 1.45 MG/DL (ref 0.5–1.05)
CRP SERPL-MCNC: 5.11 MG/DL
EGFRCR SERPLBLD CKD-EPI 2021: 37 ML/MIN/1.73M*2
EOSINOPHIL # BLD AUTO: 0 X10*3/UL (ref 0–0.4)
EOSINOPHIL NFR BLD AUTO: 0 %
ERYTHROCYTE [DISTWIDTH] IN BLOOD BY AUTOMATED COUNT: 14.5 % (ref 11.5–14.5)
GLUCOSE SERPL-MCNC: 159 MG/DL (ref 74–99)
HCT VFR BLD AUTO: 32.9 % (ref 36–46)
HGB BLD-MCNC: 10.6 G/DL (ref 12–16)
IMM GRANULOCYTES # BLD AUTO: 0.05 X10*3/UL (ref 0–0.5)
IMM GRANULOCYTES NFR BLD AUTO: 0.4 % (ref 0–0.9)
LACTATE SERPL-SCNC: 1.1 MMOL/L (ref 0.4–2)
LYMPHOCYTES # BLD AUTO: 2.67 X10*3/UL (ref 0.8–3)
LYMPHOCYTES NFR BLD AUTO: 23.4 %
MCH RBC QN AUTO: 30.5 PG (ref 26–34)
MCHC RBC AUTO-ENTMCNC: 32.2 G/DL (ref 32–36)
MCV RBC AUTO: 95 FL (ref 80–100)
MONOCYTES # BLD AUTO: 0.98 X10*3/UL (ref 0.05–0.8)
MONOCYTES NFR BLD AUTO: 8.6 %
NEUTROPHILS # BLD AUTO: 7.69 X10*3/UL (ref 1.6–5.5)
NEUTROPHILS NFR BLD AUTO: 67.4 %
NRBC BLD-RTO: 0 /100 WBCS (ref 0–0)
PLATELET # BLD AUTO: 306 X10*3/UL (ref 150–450)
POTASSIUM SERPL-SCNC: 4.5 MMOL/L (ref 3.5–5.3)
RBC # BLD AUTO: 3.48 X10*6/UL (ref 4–5.2)
SODIUM SERPL-SCNC: 133 MMOL/L (ref 136–145)
WBC # BLD AUTO: 11.4 X10*3/UL (ref 4.4–11.3)

## 2025-06-21 PROCEDURE — 85025 COMPLETE CBC W/AUTO DIFF WBC: CPT | Performed by: PHYSICIAN ASSISTANT

## 2025-06-21 PROCEDURE — 83605 ASSAY OF LACTIC ACID: CPT | Performed by: PHYSICIAN ASSISTANT

## 2025-06-21 PROCEDURE — 82374 ASSAY BLOOD CARBON DIOXIDE: CPT | Performed by: PHYSICIAN ASSISTANT

## 2025-06-21 PROCEDURE — 99284 EMERGENCY DEPT VISIT MOD MDM: CPT

## 2025-06-21 PROCEDURE — 2500000001 HC RX 250 WO HCPCS SELF ADMINISTERED DRUGS (ALT 637 FOR MEDICARE OP): Performed by: PHYSICIAN ASSISTANT

## 2025-06-21 PROCEDURE — 73130 X-RAY EXAM OF HAND: CPT | Mod: RT

## 2025-06-21 PROCEDURE — 86140 C-REACTIVE PROTEIN: CPT | Performed by: PHYSICIAN ASSISTANT

## 2025-06-21 PROCEDURE — 73130 X-RAY EXAM OF HAND: CPT | Mod: RIGHT SIDE | Performed by: RADIOLOGY

## 2025-06-21 RX ORDER — ACETAMINOPHEN 325 MG/1
975 TABLET ORAL ONCE
Status: COMPLETED | OUTPATIENT
Start: 2025-06-21 | End: 2025-06-21

## 2025-06-21 RX ORDER — ACETAMINOPHEN 500 MG
1000 TABLET ORAL EVERY 6 HOURS PRN
Qty: 30 TABLET | Refills: 0 | Status: SHIPPED | OUTPATIENT
Start: 2025-06-21 | End: 2025-07-01

## 2025-06-21 RX ORDER — DOXYCYCLINE 100 MG/1
100 TABLET ORAL 2 TIMES DAILY
Qty: 14 TABLET | Refills: 0 | Status: SHIPPED | OUTPATIENT
Start: 2025-06-22 | End: 2025-06-29

## 2025-06-21 RX ORDER — DOXYCYCLINE HYCLATE 100 MG
100 TABLET ORAL ONCE
Status: COMPLETED | OUTPATIENT
Start: 2025-06-21 | End: 2025-06-21

## 2025-06-21 RX ADMIN — ACETAMINOPHEN 975 MG: 325 TABLET ORAL at 11:15

## 2025-06-21 RX ADMIN — DOXYCYCLINE HYCLATE 100 MG: 100 TABLET, FILM COATED ORAL at 12:58

## 2025-06-21 ASSESSMENT — LIFESTYLE VARIABLES
HAVE YOU EVER FELT YOU SHOULD CUT DOWN ON YOUR DRINKING: NO
HAVE PEOPLE ANNOYED YOU BY CRITICIZING YOUR DRINKING: NO
EVER HAD A DRINK FIRST THING IN THE MORNING TO STEADY YOUR NERVES TO GET RID OF A HANGOVER: NO
TOTAL SCORE: 0
EVER FELT BAD OR GUILTY ABOUT YOUR DRINKING: NO

## 2025-06-21 ASSESSMENT — PAIN SCALES - GENERAL: PAINLEVEL_OUTOF10: 8

## 2025-06-21 ASSESSMENT — PAIN DESCRIPTION - DESCRIPTORS: DESCRIPTORS: ACHING

## 2025-06-21 ASSESSMENT — PAIN - FUNCTIONAL ASSESSMENT
PAIN_FUNCTIONAL_ASSESSMENT: 0-10
PAIN_FUNCTIONAL_ASSESSMENT: 0-10

## 2025-06-21 ASSESSMENT — PAIN DESCRIPTION - LOCATION: LOCATION: HAND

## 2025-06-21 ASSESSMENT — PAIN DESCRIPTION - ORIENTATION: ORIENTATION: RIGHT

## 2025-06-21 NOTE — ED PROVIDER NOTES
"HPI   Chief Complaint   Patient presents with    Hand Pain     \"Here for hand pain and swelling that is moving up my arm.  Has been going on since yesterday real bad.  Has been sore for awhile.  No injury or falls.\"       A 75-year-old female patient comes in the emergency department today with complaints of swelling to the right dorsal hand that started yesterday.  States the tingling is getting worse and is swelling little bit.  She describes it is painful rates it 8 out of 10 on the pain scale.  Denies any injury.  Denies any fevers, chills, nausea, vomiting.  States she does have history of diabetes.  This provide she comes in the emergency department today for the evaluation.  Otherwise no complaints present time.              Patient History   Medical History[1]  Surgical History[2]  Family History[3]  Social History[4]    Physical Exam   ED Triage Vitals [06/21/25 1035]   Temperature Heart Rate Respirations BP   36.7 °C (98.1 °F) 77 18 164/71      Pulse Ox Temp Source Heart Rate Source Patient Position   99 % Temporal Monitor Sitting      BP Location FiO2 (%)     Right arm --       Physical Exam  Constitutional:       General: She is in acute distress.      Appearance: Normal appearance. She is not ill-appearing or diaphoretic.   HENT:      Head: Normocephalic and atraumatic.      Nose: Nose normal.   Eyes:      Extraocular Movements: Extraocular movements intact.      Conjunctiva/sclera: Conjunctivae normal.      Pupils: Pupils are equal, round, and reactive to light.   Cardiovascular:      Rate and Rhythm: Normal rate and regular rhythm.   Pulmonary:      Effort: Pulmonary effort is normal. No respiratory distress.      Breath sounds: Normal breath sounds. No stridor. No wheezing.   Musculoskeletal:         General: Normal range of motion.      Cervical back: Normal range of motion.   Skin:     General: Skin is warm and dry.      Findings: Erythema (Erythema to the dorsal aspect of the right hand with " warmth consistent with a cellulitis no fluctuant areas to denote an abscess.) present.   Neurological:      General: No focal deficit present.      Mental Status: She is alert and oriented to person, place, and time. Mental status is at baseline.   Psychiatric:         Mood and Affect: Mood normal.           ED Course & MDM   Diagnoses as of 06/21/25 1257   Cellulitis of right hand                 No data recorded                                 Medical Decision Making  A 75-year-old female patient comes in the emergency department today with complaints of swelling to the right dorsal hand that started yesterday.  States the tingling is getting worse and is swelling little bit.  She describes it is painful rates it 8 out of 10 on the pain scale.  Denies any injury.  Denies any fevers, chills, nausea, vomiting.  States she does have history of diabetes.  This provide she comes in the emergency department today for the evaluation.  Otherwise no complaints present time.    X-ray of the right hand ordered to rule out any acute bony abnormality, laboratory studies ordered to rule out significant leukocytosis, left shift, acute kidney injury or electrolyte normalities.    Patient has normal vital signs.  Patient has a creatinine of 1.45 GFR 37 which is baseline for the patient.  Negative lactate mild white count 11.4.  CRP elevated at 5.  Consistent with cellulitis based off clinical exam and laboratory studies.  As patient has normal vital signs negative lactate will give patient first dose p.o. doxycycline here in the emergency department discharge patient home on the same.  Discussed close return precautions with patient and family to return to the emergency department.  Will prescribe doxycycline for home.  She agrees with this plan expressed verbal understanding.  All questions were answered.    Historian is the patient    Diagnosis: Right hand cellulitis      Labs Reviewed   CBC WITH AUTO DIFFERENTIAL - Abnormal        Result Value    WBC 11.4 (*)     nRBC 0.0      RBC 3.48 (*)     Hemoglobin 10.6 (*)     Hematocrit 32.9 (*)     MCV 95      MCH 30.5      MCHC 32.2      RDW 14.5      Platelets 306      Neutrophils % 67.4      Immature Granulocytes %, Automated 0.4      Lymphocytes % 23.4      Monocytes % 8.6      Eosinophils % 0.0      Basophils % 0.2      Neutrophils Absolute 7.69 (*)     Immature Granulocytes Absolute, Automated 0.05      Lymphocytes Absolute 2.67      Monocytes Absolute 0.98 (*)     Eosinophils Absolute 0.00      Basophils Absolute 0.02     BASIC METABOLIC PANEL - Abnormal    Glucose 159 (*)     Sodium 133 (*)     Potassium 4.5      Chloride 102      Bicarbonate 24      Anion Gap 12      Urea Nitrogen 25 (*)     Creatinine 1.45 (*)     eGFR 37 (*)     Calcium 9.5     C-REACTIVE PROTEIN - Abnormal    C-Reactive Protein 5.11 (*)    LACTATE - Normal    Lactate 1.1      Narrative:     Venipuncture immediately after or during the administration of Metamizole may lead to falsely low results. Testing should be performed immediately prior to Metamizole dosing.        XR hand right 3+ views   Final Result   No acute osseous findings of the right hand. Mild degenerative   changes.        MACRO:   None        Signed by: Trace Pete 6/21/2025 11:47 AM   Dictation workstation:   [id number]          Procedure  Procedures       [1]   Past Medical History:  Diagnosis Date    Breast cancer     Chronic kidney disease     Chronic obstructive pulmonary disease with (acute) exacerbation (Multi) 04/15/2022    Acute exacerbation of chronic obstructive pulmonary disease (COPD)    Diabetes mellitus (Multi)     Hx antineoplastic chemo     Hypertension     Personal history of diseases of the blood and blood-forming organs and certain disorders involving the immune mechanism     History of anemia    Personal history of irradiation     Personal history of other diseases of the musculoskeletal system and connective tissue     History of  arthritis    Personal history of other diseases of the respiratory system     History of chronic obstructive lung disease    Personal history of other diseases of the respiratory system     History of upper respiratory infection    Personal history of other diseases of the respiratory system     History of acute pharyngitis    Personal history of other diseases of the respiratory system 10/25/2016    History of acute pharyngitis    Personal history of other diseases of the respiratory system     History of pulmonary emphysema    Personal history of other endocrine, nutritional and metabolic disease     History of hyperlipidemia    Personal history of other endocrine, nutritional and metabolic disease     History of diabetes mellitus    Personal history of other specified conditions     History of chest pain    Shortness of breath     Shortness of breath at rest   [2]   Past Surgical History:  Procedure Laterality Date    APPENDECTOMY  10/25/2016    Appendectomy    BREAST LUMPECTOMY      BREAST SURGERY  10/25/2016    Breast Surgery    CARDIAC CATHETERIZATION Left 5/14/2025    Procedure: LHC, With LV;  Surgeon: Blaine Ponce MD;  Location: [facility];  Service: Cardiovascular;  Laterality: Left;  holding    COLONOSCOPY  10/25/2016    Complete Colonoscopy    CT ANGIO NECK  10/04/2021    CT NECK ANGIO W AND WO IV CONTRAST 10/4/2021 [facility]    MOUTH SURGERY  10/25/2016    Oral Surgery Tooth Extraction    OTHER SURGICAL HISTORY  01/21/2022    Ganglion cyst excision   [3]   Family History  Problem Relation Name Age of Onset    No Known Problems Mother      Cancer Father      Diabetes Father     [4]   Social History  Tobacco Use    Smoking status: Every Day     Current packs/day: 0.50     Average packs/day: 0.5 packs/day for 56.5 years (28.2 ttl pk-yrs)     Types: Cigarettes     Start date: 1969     Passive exposure: Past    Smokeless tobacco: Never    Tobacco comments:     Last smoked Dec 31,  2024 at 11:30   Vaping Use    Vaping status: Never Used   Substance Use Topics    Alcohol use: Not Currently    Drug use: Not Currently        Luis Cabrales PA-C  06/21/25 1257

## 2025-06-30 ENCOUNTER — INFUSION (OUTPATIENT)
Dept: HEMATOLOGY/ONCOLOGY | Facility: CLINIC | Age: 75
End: 2025-06-30
Payer: MEDICARE

## 2025-06-30 VITALS
WEIGHT: 158 LBS | TEMPERATURE: 98.1 F | OXYGEN SATURATION: 100 % | HEIGHT: 62 IN | BODY MASS INDEX: 29.08 KG/M2 | DIASTOLIC BLOOD PRESSURE: 61 MMHG | HEART RATE: 74 BPM | RESPIRATION RATE: 20 BRPM | SYSTOLIC BLOOD PRESSURE: 131 MMHG

## 2025-06-30 DIAGNOSIS — Z45.2 ENCOUNTER FOR CARE RELATED TO VASCULAR ACCESS PORT: ICD-10-CM

## 2025-06-30 PROCEDURE — 96523 IRRIG DRUG DELIVERY DEVICE: CPT

## 2025-06-30 PROCEDURE — 2500000004 HC RX 250 GENERAL PHARMACY W/ HCPCS (ALT 636 FOR OP/ED)

## 2025-06-30 RX ORDER — HEPARIN 100 UNIT/ML
5 SYRINGE INTRAVENOUS ONCE
Status: COMPLETED | OUTPATIENT
Start: 2025-06-30 | End: 2025-06-30

## 2025-06-30 RX ADMIN — HEPARIN 500 UNITS: 100 SYRINGE at 09:36

## 2025-06-30 ASSESSMENT — PAIN SCALES - GENERAL: PAINLEVEL_OUTOF10: 0-NO PAIN

## 2025-07-02 ENCOUNTER — TELEPHONE (OUTPATIENT)
Dept: CARDIOLOGY | Facility: HOSPITAL | Age: 75
End: 2025-07-02
Payer: MEDICARE

## 2025-07-02 DIAGNOSIS — I35.0 AORTIC STENOSIS: Primary | ICD-10-CM

## 2025-07-02 NOTE — TELEPHONE ENCOUNTER
Rn coordinator called patient regarding setting up TAVR procedure. Per Dr Ponce, ok to perform @ [facility]. Pt agreed to 7/8 procedure day. Once orders and case requested entered, will call back to review procedure

## 2025-07-06 LAB
ATRIAL RATE: 65 BPM
ATRIAL RATE: 74 BPM
P AXIS: 47 DEGREES
P AXIS: 57 DEGREES
P OFFSET: 194 MS
P OFFSET: 195 MS
P ONSET: 147 MS
P ONSET: 151 MS
PR INTERVAL: 144 MS
PR INTERVAL: 158 MS
Q ONSET: 223 MS
Q ONSET: 226 MS
QRS COUNT: 11 BEATS
QRS COUNT: 12 BEATS
QRS DURATION: 70 MS
QRS DURATION: 72 MS
QT INTERVAL: 392 MS
QT INTERVAL: 408 MS
QTC CALCULATION(BAZETT): 424 MS
QTC CALCULATION(BAZETT): 435 MS
QTC FREDERICIA: 418 MS
QTC FREDERICIA: 420 MS
R AXIS: 46 DEGREES
R AXIS: 51 DEGREES
T AXIS: 65 DEGREES
T AXIS: 69 DEGREES
T OFFSET: 419 MS
T OFFSET: 430 MS
VENTRICULAR RATE: 65 BPM
VENTRICULAR RATE: 74 BPM

## 2025-07-07 ENCOUNTER — HOSPITAL ENCOUNTER (OUTPATIENT)
Dept: LAB | Age: 75
Discharge: HOME | End: 2025-07-07
Payer: MEDICARE

## 2025-07-07 ENCOUNTER — TELEPHONE (OUTPATIENT)
Dept: CARDIOLOGY | Facility: HOSPITAL | Age: 75
End: 2025-07-07
Payer: MEDICARE

## 2025-07-07 LAB
ANION GAP SERPL CALCULATED.3IONS-SCNC: 12 MEQ/L (ref 9–15)
BASOPHILS # BLD: 0 K/UL (ref 0–0.2)
BASOPHILS NFR BLD: 0.3 %
BUN SERPL-MCNC: 28 MG/DL (ref 8–23)
CALCIUM SERPL-MCNC: 9.1 MG/DL (ref 8.5–9.9)
CHLORIDE SERPL-SCNC: 103 MEQ/L (ref 95–107)
CO2 SERPL-SCNC: 23 MEQ/L (ref 20–31)
CREAT SERPL-MCNC: 1.74 MG/DL (ref 0.5–0.9)
EOSINOPHIL # BLD: 0.1 K/UL (ref 0–0.7)
EOSINOPHIL NFR BLD: 1.2 %
ERYTHROCYTE [DISTWIDTH] IN BLOOD BY AUTOMATED COUNT: 15.5 % (ref 11.5–14.5)
GLUCOSE SERPL-MCNC: 200 MG/DL (ref 70–99)
HCT VFR BLD AUTO: 32.4 % (ref 37–47)
HGB BLD-MCNC: 10.4 G/DL (ref 12–16)
LYMPHOCYTES # BLD: 3.4 K/UL (ref 1–4.8)
LYMPHOCYTES NFR BLD: 33.1 %
MCH RBC QN AUTO: 30.2 PG (ref 27–31.3)
MCHC RBC AUTO-ENTMCNC: 32.1 % (ref 33–37)
MCV RBC AUTO: 94.2 FL (ref 79.4–94.8)
MONOCYTES # BLD: 0.7 K/UL (ref 0.2–0.8)
MONOCYTES NFR BLD: 6.3 %
NEUTROPHILS # BLD: 6.1 K/UL (ref 1.4–6.5)
NEUTS SEG NFR BLD: 58.5 %
PHOSPHATE SERPL-MCNC: 3.1 MG/DL (ref 2.3–4.8)
PLATELET # BLD AUTO: 293 K/UL (ref 130–400)
POTASSIUM SERPL-SCNC: 4.2 MEQ/L (ref 3.4–4.9)
PTH-INTACT SERPL-MCNC: 48.8 PG/ML (ref 15–65)
RBC # BLD AUTO: 3.44 M/UL (ref 4.2–5.4)
SODIUM SERPL-SCNC: 138 MEQ/L (ref 135–144)
WBC # BLD AUTO: 10.4 K/UL (ref 4.8–10.8)

## 2025-07-07 PROCEDURE — 80048 BASIC METABOLIC PNL TOTAL CA: CPT

## 2025-07-07 PROCEDURE — 83036 HEMOGLOBIN GLYCOSYLATED A1C: CPT

## 2025-07-07 PROCEDURE — 83970 ASSAY OF PARATHORMONE: CPT

## 2025-07-07 PROCEDURE — 36415 COLL VENOUS BLD VENIPUNCTURE: CPT

## 2025-07-07 PROCEDURE — 84100 ASSAY OF PHOSPHORUS: CPT

## 2025-07-07 PROCEDURE — 85025 COMPLETE CBC W/AUTO DIFF WBC: CPT

## 2025-07-07 NOTE — TELEPHONE ENCOUNTER
Rn coordinator called patient regarding procedure prep and medication review. Discussed medication , review to hold losartan and ACTOS, all vitamins and supplements  as appropriate, Pt verbalized understanding.

## 2025-07-08 ENCOUNTER — APPOINTMENT (OUTPATIENT)
Dept: CARDIOLOGY | Facility: HOSPITAL | Age: 75
DRG: 267 | End: 2025-07-08
Payer: MEDICARE

## 2025-07-08 ENCOUNTER — APPOINTMENT (OUTPATIENT)
Dept: RADIOLOGY | Facility: HOSPITAL | Age: 75
DRG: 267 | End: 2025-07-08
Payer: MEDICARE

## 2025-07-08 ENCOUNTER — HOSPITAL ENCOUNTER (INPATIENT)
Facility: HOSPITAL | Age: 75
LOS: 1 days | Discharge: HOME | DRG: 267 | End: 2025-07-09
Payer: MEDICARE

## 2025-07-08 DIAGNOSIS — I35.0 AORTIC STENOSIS: Primary | ICD-10-CM

## 2025-07-08 DIAGNOSIS — Z95.2 PRESENCE OF PROSTHETIC HEART VALVE: ICD-10-CM

## 2025-07-08 DIAGNOSIS — I35.0 NONRHEUMATIC AORTIC VALVE STENOSIS: ICD-10-CM

## 2025-07-08 DIAGNOSIS — Z01.818 PRE-OP EXAM: ICD-10-CM

## 2025-07-08 DIAGNOSIS — Z09 POSTOPERATIVE EXAMINATION: ICD-10-CM

## 2025-07-08 LAB
ABO GROUP (TYPE) IN BLOOD: NORMAL
ACT BLD: 292 SEC (ref 83–199)
ALBUMIN SERPL BCP-MCNC: 3.6 G/DL (ref 3.4–5)
ALP SERPL-CCNC: 54 U/L (ref 33–136)
ALT SERPL W P-5'-P-CCNC: 13 U/L (ref 7–45)
ANION GAP SERPL CALC-SCNC: 11 MMOL/L (ref 10–20)
ANION GAP SERPL CALC-SCNC: 9 MMOL/L (ref 10–20)
ANTIBODY SCREEN: NORMAL
AORTIC VALVE MEAN GRADIENT: 39 MMHG
AORTIC VALVE PEAK VELOCITY: 3.95 M/S
APTT PPP: 32 SECONDS (ref 26–36)
AST SERPL W P-5'-P-CCNC: 14 U/L (ref 9–39)
ATRIAL RATE: 64 BPM
AV PEAK GRADIENT: 62 MMHG
AVA (PEAK VEL): 0.83 CM2
AVA (VTI): 0.89 CM2
BASOPHILS # BLD AUTO: 0.02 X10*3/UL (ref 0–0.1)
BASOPHILS NFR BLD AUTO: 0.3 %
BILIRUB SERPL-MCNC: 0.3 MG/DL (ref 0–1.2)
BUN SERPL-MCNC: 24 MG/DL (ref 6–23)
BUN SERPL-MCNC: 28 MG/DL (ref 6–23)
CALCIUM SERPL-MCNC: 8.4 MG/DL (ref 8.6–10.3)
CALCIUM SERPL-MCNC: 9 MG/DL (ref 8.6–10.3)
CHLORIDE SERPL-SCNC: 107 MMOL/L (ref 98–107)
CHLORIDE SERPL-SCNC: 108 MMOL/L (ref 98–107)
CO2 SERPL-SCNC: 23 MMOL/L (ref 21–32)
CO2 SERPL-SCNC: 25 MMOL/L (ref 21–32)
CREAT SERPL-MCNC: 1.42 MG/DL (ref 0.5–1.05)
CREAT SERPL-MCNC: 1.51 MG/DL (ref 0.5–1.05)
EGFRCR SERPLBLD CKD-EPI 2021: 35 ML/MIN/1.73M*2
EGFRCR SERPLBLD CKD-EPI 2021: 38 ML/MIN/1.73M*2
EJECTION FRACTION APICAL 4 CHAMBER: 62.3
EJECTION FRACTION: 63 %
EOSINOPHIL # BLD AUTO: 0.14 X10*3/UL (ref 0–0.4)
EOSINOPHIL NFR BLD AUTO: 1.8 %
ERYTHROCYTE [DISTWIDTH] IN BLOOD BY AUTOMATED COUNT: 15.7 % (ref 11.5–14.5)
ERYTHROCYTE [DISTWIDTH] IN BLOOD BY AUTOMATED COUNT: 15.8 % (ref 11.5–14.5)
ESTIMATED AVERAGE GLUCOSE: 146 MG/DL
GLUCOSE BLD MANUAL STRIP-MCNC: 103 MG/DL (ref 74–99)
GLUCOSE BLD MANUAL STRIP-MCNC: 121 MG/DL (ref 74–99)
GLUCOSE BLD MANUAL STRIP-MCNC: 89 MG/DL (ref 74–99)
GLUCOSE SERPL-MCNC: 122 MG/DL (ref 74–99)
GLUCOSE SERPL-MCNC: 133 MG/DL (ref 74–99)
HBA1C MFR BLD: 6.7 % (ref 4–6)
HCT VFR BLD AUTO: 30.2 % (ref 36–46)
HCT VFR BLD AUTO: 32.7 % (ref 36–46)
HGB BLD-MCNC: 10.3 G/DL (ref 12–16)
HGB BLD-MCNC: 9.5 G/DL (ref 12–16)
IMM GRANULOCYTES # BLD AUTO: 0.03 X10*3/UL (ref 0–0.5)
IMM GRANULOCYTES NFR BLD AUTO: 0.4 % (ref 0–0.9)
INR PPP: 1 (ref 0.9–1.1)
INR PPP: 1.1 (ref 0.9–1.1)
LEFT ATRIUM VOLUME AREA LENGTH INDEX BSA: 21.9 ML/M2
LEFT VENTRICLE INTERNAL DIMENSION DIASTOLE: 3.42 CM (ref 3.5–6)
LEFT VENTRICULAR OUTFLOW TRACT DIAMETER: 1.98 CM
LV EJECTION FRACTION BIPLANE: 64 %
LYMPHOCYTES # BLD AUTO: 2.47 X10*3/UL (ref 0.8–3)
LYMPHOCYTES NFR BLD AUTO: 31.4 %
MAGNESIUM SERPL-MCNC: 2.01 MG/DL (ref 1.6–2.4)
MCH RBC QN AUTO: 30.3 PG (ref 26–34)
MCH RBC QN AUTO: 30.4 PG (ref 26–34)
MCHC RBC AUTO-ENTMCNC: 31.5 G/DL (ref 32–36)
MCHC RBC AUTO-ENTMCNC: 31.5 G/DL (ref 32–36)
MCV RBC AUTO: 96 FL (ref 80–100)
MCV RBC AUTO: 97 FL (ref 80–100)
MONOCYTES # BLD AUTO: 0.66 X10*3/UL (ref 0.05–0.8)
MONOCYTES NFR BLD AUTO: 8.4 %
NEUTROPHILS # BLD AUTO: 4.54 X10*3/UL (ref 1.6–5.5)
NEUTROPHILS NFR BLD AUTO: 57.7 %
NRBC BLD-RTO: 0 /100 WBCS (ref 0–0)
NRBC BLD-RTO: 0.2 /100 WBCS (ref 0–0)
P AXIS: 56 DEGREES
P OFFSET: 175 MS
P ONSET: 126 MS
PLATELET # BLD AUTO: 223 X10*3/UL (ref 150–450)
PLATELET # BLD AUTO: 261 X10*3/UL (ref 150–450)
POTASSIUM SERPL-SCNC: 4.2 MMOL/L (ref 3.5–5.3)
POTASSIUM SERPL-SCNC: 4.6 MMOL/L (ref 3.5–5.3)
PR INTERVAL: 174 MS
PROT SERPL-MCNC: 7 G/DL (ref 6.4–8.2)
PROTHROMBIN TIME: 11.5 SECONDS (ref 9.8–12.4)
PROTHROMBIN TIME: 12.6 SECONDS (ref 9.8–12.4)
Q ONSET: 213 MS
QRS COUNT: 10 BEATS
QRS DURATION: 104 MS
QT INTERVAL: 428 MS
QTC CALCULATION(BAZETT): 441 MS
QTC FREDERICIA: 437 MS
R AXIS: 34 DEGREES
RBC # BLD AUTO: 3.14 X10*6/UL (ref 4–5.2)
RBC # BLD AUTO: 3.39 X10*6/UL (ref 4–5.2)
RH FACTOR (ANTIGEN D): NORMAL
RIGHT VENTRICLE PEAK SYSTOLIC PRESSURE: 43 MMHG
SODIUM SERPL-SCNC: 137 MMOL/L (ref 136–145)
SODIUM SERPL-SCNC: 137 MMOL/L (ref 136–145)
T AXIS: 163 DEGREES
T OFFSET: 427 MS
VENTRICULAR RATE: 64 BPM
WBC # BLD AUTO: 7.9 X10*3/UL (ref 4.4–11.3)
WBC # BLD AUTO: 8.5 X10*3/UL (ref 4.4–11.3)

## 2025-07-08 PROCEDURE — 93321 DOPPLER ECHO F-UP/LMTD STD: CPT | Performed by: INTERNAL MEDICINE

## 2025-07-08 PROCEDURE — 2500000004 HC RX 250 GENERAL PHARMACY W/ HCPCS (ALT 636 FOR OP/ED)

## 2025-07-08 PROCEDURE — 93308 TTE F-UP OR LMTD: CPT | Performed by: INTERNAL MEDICINE

## 2025-07-08 PROCEDURE — 33361 REPLACE AORTIC VALVE PERQ: CPT

## 2025-07-08 PROCEDURE — 82947 ASSAY GLUCOSE BLOOD QUANT: CPT

## 2025-07-08 PROCEDURE — 2780000003 HC OR 278 NO HCPCS

## 2025-07-08 PROCEDURE — 93010 ELECTROCARDIOGRAM REPORT: CPT | Performed by: INTERNAL MEDICINE

## 2025-07-08 PROCEDURE — 2500000005 HC RX 250 GENERAL PHARMACY W/O HCPCS: Performed by: NURSE PRACTITIONER

## 2025-07-08 PROCEDURE — C1894 INTRO/SHEATH, NON-LASER: HCPCS

## 2025-07-08 PROCEDURE — 3600000012 HC PERFUSION TIME - EACH INCREMENTAL 1 MINUTE

## 2025-07-08 PROCEDURE — 86850 RBC ANTIBODY SCREEN: CPT | Performed by: NURSE PRACTITIONER

## 2025-07-08 PROCEDURE — 02RF38Z REPLACEMENT OF AORTIC VALVE WITH ZOOPLASTIC TISSUE, PERCUTANEOUS APPROACH: ICD-10-PCS

## 2025-07-08 PROCEDURE — 83735 ASSAY OF MAGNESIUM: CPT | Performed by: NURSE PRACTITIONER

## 2025-07-08 PROCEDURE — 33361 REPLACE AORTIC VALVE PERQ: CPT | Mod: 62

## 2025-07-08 PROCEDURE — 93005 ELECTROCARDIOGRAM TRACING: CPT

## 2025-07-08 PROCEDURE — C1769 GUIDE WIRE: HCPCS

## 2025-07-08 PROCEDURE — 5A1223Z PERFORMANCE OF CARDIAC PACING, CONTINUOUS: ICD-10-PCS

## 2025-07-08 PROCEDURE — 2500000004 HC RX 250 GENERAL PHARMACY W/ HCPCS (ALT 636 FOR OP/ED): Performed by: NURSE PRACTITIONER

## 2025-07-08 PROCEDURE — 93325 DOPPLER ECHO COLOR FLOW MAPG: CPT | Performed by: INTERNAL MEDICINE

## 2025-07-08 PROCEDURE — 93321 DOPPLER ECHO F-UP/LMTD STD: CPT

## 2025-07-08 PROCEDURE — C1760 CLOSURE DEV, VASC: HCPCS

## 2025-07-08 PROCEDURE — 2720000007 HC OR 272 NO HCPCS

## 2025-07-08 PROCEDURE — C1887 CATHETER, GUIDING: HCPCS

## 2025-07-08 PROCEDURE — 36415 COLL VENOUS BLD VENIPUNCTURE: CPT | Performed by: NURSE PRACTITIONER

## 2025-07-08 PROCEDURE — C1756 CATH, PACING, TRANSESOPH: HCPCS

## 2025-07-08 PROCEDURE — 85610 PROTHROMBIN TIME: CPT | Performed by: NURSE PRACTITIONER

## 2025-07-08 PROCEDURE — 76937 US GUIDE VASCULAR ACCESS: CPT

## 2025-07-08 PROCEDURE — 82565 ASSAY OF CREATININE: CPT | Performed by: NURSE PRACTITIONER

## 2025-07-08 PROCEDURE — 2550000001 HC RX 255 CONTRASTS: Mod: JW

## 2025-07-08 PROCEDURE — 71045 X-RAY EXAM CHEST 1 VIEW: CPT

## 2025-07-08 PROCEDURE — 71045 X-RAY EXAM CHEST 1 VIEW: CPT | Performed by: RADIOLOGY

## 2025-07-08 PROCEDURE — G0269 OCCLUSIVE DEVICE IN VEIN ART: HCPCS | Mod: 59

## 2025-07-08 PROCEDURE — 99291 CRITICAL CARE FIRST HOUR: CPT

## 2025-07-08 PROCEDURE — 2020000001 HC ICU ROOM DAILY

## 2025-07-08 PROCEDURE — 99152 MOD SED SAME PHYS/QHP 5/>YRS: CPT

## 2025-07-08 PROCEDURE — 85027 COMPLETE CBC AUTOMATED: CPT | Performed by: NURSE PRACTITIONER

## 2025-07-08 PROCEDURE — C1725 CATH, TRANSLUMIN NON-LASER: HCPCS

## 2025-07-08 PROCEDURE — 99153 MOD SED SAME PHYS/QHP EA: CPT

## 2025-07-08 PROCEDURE — 2500000001 HC RX 250 WO HCPCS SELF ADMINISTERED DRUGS (ALT 637 FOR MEDICARE OP): Performed by: NURSE PRACTITIONER

## 2025-07-08 PROCEDURE — 85025 COMPLETE CBC W/AUTO DIFF WBC: CPT | Performed by: NURSE PRACTITIONER

## 2025-07-08 PROCEDURE — 85347 COAGULATION TIME ACTIVATED: CPT

## 2025-07-08 PROCEDURE — C1889 IMPLANT/INSERT DEVICE, NOC: HCPCS

## 2025-07-08 PROCEDURE — 3600000011 HC PERFUSION TIME - INITIAL BASE CHARGE

## 2025-07-08 PROCEDURE — 84075 ASSAY ALKALINE PHOSPHATASE: CPT | Performed by: NURSE PRACTITIONER

## 2025-07-08 PROCEDURE — 33361 REPLACE AORTIC VALVE PERQ: CPT | Performed by: THORACIC SURGERY (CARDIOTHORACIC VASCULAR SURGERY)

## 2025-07-08 DEVICE — LOADING SYS L-EVOLUTFX-2329
Type: IMPLANTABLE DEVICE | Site: HEART | Status: FUNCTIONAL
Brand: EVOLUT™ FX

## 2025-07-08 RX ORDER — POLYETHYLENE GLYCOL 3350 17 G/17G
17 POWDER, FOR SOLUTION ORAL DAILY
Status: DISCONTINUED | OUTPATIENT
Start: 2025-07-08 | End: 2025-07-09 | Stop reason: HOSPADM

## 2025-07-08 RX ORDER — BISACODYL 5 MG
10 TABLET, DELAYED RELEASE (ENTERIC COATED) ORAL DAILY PRN
Status: DISCONTINUED | OUTPATIENT
Start: 2025-07-08 | End: 2025-07-09 | Stop reason: HOSPADM

## 2025-07-08 RX ORDER — ROSUVASTATIN CALCIUM 20 MG/1
20 TABLET, COATED ORAL NIGHTLY
Status: DISCONTINUED | OUTPATIENT
Start: 2025-07-09 | End: 2025-07-09 | Stop reason: HOSPADM

## 2025-07-08 RX ORDER — DEXTROSE 50 % IN WATER (D50W) INTRAVENOUS SYRINGE
25
Status: DISCONTINUED | OUTPATIENT
Start: 2025-07-08 | End: 2025-07-09 | Stop reason: HOSPADM

## 2025-07-08 RX ORDER — SODIUM CHLORIDE 9 MG/ML
50 INJECTION, SOLUTION INTRAVENOUS CONTINUOUS
Status: ACTIVE | OUTPATIENT
Start: 2025-07-08 | End: 2025-07-08

## 2025-07-08 RX ORDER — MUPIROCIN 20 MG/G
1 OINTMENT TOPICAL 2 TIMES DAILY
Status: DISCONTINUED | OUTPATIENT
Start: 2025-07-08 | End: 2025-07-09 | Stop reason: HOSPADM

## 2025-07-08 RX ORDER — HYDROMORPHONE HYDROCHLORIDE 0.2 MG/ML
0.2 INJECTION INTRAMUSCULAR; INTRAVENOUS; SUBCUTANEOUS
Status: DISCONTINUED | OUTPATIENT
Start: 2025-07-08 | End: 2025-07-09 | Stop reason: HOSPADM

## 2025-07-08 RX ORDER — MIDAZOLAM HYDROCHLORIDE 2 MG/2ML
INJECTION, SOLUTION INTRAMUSCULAR; INTRAVENOUS AS NEEDED
Status: DISCONTINUED | OUTPATIENT
Start: 2025-07-08 | End: 2025-07-08 | Stop reason: HOSPADM

## 2025-07-08 RX ORDER — HEPARIN SODIUM 1000 [USP'U]/ML
INJECTION, SOLUTION INTRAVENOUS; SUBCUTANEOUS AS NEEDED
Status: DISCONTINUED | OUTPATIENT
Start: 2025-07-08 | End: 2025-07-08 | Stop reason: HOSPADM

## 2025-07-08 RX ORDER — ASPIRIN 325 MG
100 TABLET, DELAYED RELEASE (ENTERIC COATED) ORAL DAILY
COMMUNITY

## 2025-07-08 RX ORDER — DEXTROSE 50 % IN WATER (D50W) INTRAVENOUS SYRINGE
12.5
Status: DISCONTINUED | OUTPATIENT
Start: 2025-07-08 | End: 2025-07-09 | Stop reason: HOSPADM

## 2025-07-08 RX ORDER — ATROPINE SULFATE 0.1 MG/ML
1 INJECTION INTRAVENOUS ONCE AS NEEDED
Status: DISCONTINUED | OUTPATIENT
Start: 2025-07-08 | End: 2025-07-09 | Stop reason: HOSPADM

## 2025-07-08 RX ORDER — PANTOPRAZOLE SODIUM 40 MG/1
40 TABLET, DELAYED RELEASE ORAL
Status: DISCONTINUED | OUTPATIENT
Start: 2025-07-09 | End: 2025-07-09 | Stop reason: HOSPADM

## 2025-07-08 RX ORDER — FENTANYL CITRATE 50 UG/ML
INJECTION, SOLUTION INTRAMUSCULAR; INTRAVENOUS AS NEEDED
Status: DISCONTINUED | OUTPATIENT
Start: 2025-07-08 | End: 2025-07-08 | Stop reason: HOSPADM

## 2025-07-08 RX ORDER — PROMETHAZINE HYDROCHLORIDE 25 MG/1
25 TABLET ORAL EVERY 6 HOURS PRN
Status: DISCONTINUED | OUTPATIENT
Start: 2025-07-08 | End: 2025-07-09 | Stop reason: HOSPADM

## 2025-07-08 RX ORDER — VIT C/E/ZN/COPPR/LUTEIN/ZEAXAN 250MG-90MG
125 CAPSULE ORAL DAILY
Status: DISCONTINUED | OUTPATIENT
Start: 2025-07-08 | End: 2025-07-09 | Stop reason: HOSPADM

## 2025-07-08 RX ORDER — INSULIN LISPRO 100 [IU]/ML
0-5 INJECTION, SOLUTION INTRAVENOUS; SUBCUTANEOUS
Status: DISCONTINUED | OUTPATIENT
Start: 2025-07-08 | End: 2025-07-09 | Stop reason: HOSPADM

## 2025-07-08 RX ORDER — NAPROXEN SODIUM 220 MG/1
81 TABLET, FILM COATED ORAL ONCE
Status: DISCONTINUED | OUTPATIENT
Start: 2025-07-08 | End: 2025-07-08

## 2025-07-08 RX ORDER — DOCUSATE SODIUM 100 MG/1
100 CAPSULE, LIQUID FILLED ORAL 2 TIMES DAILY
Status: DISCONTINUED | OUTPATIENT
Start: 2025-07-08 | End: 2025-07-09 | Stop reason: HOSPADM

## 2025-07-08 RX ORDER — NAPROXEN SODIUM 220 MG/1
324 TABLET, FILM COATED ORAL DAILY
Status: DISCONTINUED | OUTPATIENT
Start: 2025-07-08 | End: 2025-07-08

## 2025-07-08 RX ORDER — CEFAZOLIN SODIUM 2 G/50ML
SOLUTION INTRAVENOUS CONTINUOUS PRN
Status: COMPLETED | OUTPATIENT
Start: 2025-07-08 | End: 2025-07-08

## 2025-07-08 RX ORDER — PROTAMINE SULFATE 10 MG/ML
INJECTION, SOLUTION INTRAVENOUS CONTINUOUS PRN
Status: COMPLETED | OUTPATIENT
Start: 2025-07-08 | End: 2025-07-08

## 2025-07-08 RX ORDER — PNV NO.95/FERROUS FUM/FOLIC AC 28MG-0.8MG
100 TABLET ORAL DAILY
Status: DISCONTINUED | OUTPATIENT
Start: 2025-07-08 | End: 2025-07-09 | Stop reason: HOSPADM

## 2025-07-08 RX ORDER — METOPROLOL TARTRATE 50 MG/1
50 TABLET ORAL 2 TIMES DAILY
Status: DISCONTINUED | OUTPATIENT
Start: 2025-07-08 | End: 2025-07-09

## 2025-07-08 RX ORDER — ACETAMINOPHEN 325 MG/1
650 TABLET ORAL EVERY 4 HOURS PRN
Status: DISCONTINUED | OUTPATIENT
Start: 2025-07-08 | End: 2025-07-09 | Stop reason: HOSPADM

## 2025-07-08 RX ORDER — SODIUM CHLORIDE 9 MG/ML
100 INJECTION, SOLUTION INTRAVENOUS CONTINUOUS
Status: ACTIVE | OUTPATIENT
Start: 2025-07-08 | End: 2025-07-08

## 2025-07-08 RX ORDER — TRAMADOL HYDROCHLORIDE 50 MG/1
25 TABLET, FILM COATED ORAL EVERY 6 HOURS PRN
Status: DISCONTINUED | OUTPATIENT
Start: 2025-07-08 | End: 2025-07-09 | Stop reason: HOSPADM

## 2025-07-08 RX ORDER — LIDOCAINE HYDROCHLORIDE 20 MG/ML
INJECTION, SOLUTION INFILTRATION; PERINEURAL AS NEEDED
Status: DISCONTINUED | OUTPATIENT
Start: 2025-07-08 | End: 2025-07-08 | Stop reason: HOSPADM

## 2025-07-08 RX ORDER — NAPROXEN SODIUM 220 MG/1
81 TABLET, FILM COATED ORAL DAILY
Status: DISCONTINUED | OUTPATIENT
Start: 2025-07-09 | End: 2025-07-09 | Stop reason: HOSPADM

## 2025-07-08 RX ORDER — PIOGLITAZONE 30 MG/1
30 TABLET ORAL DAILY
Status: DISCONTINUED | OUTPATIENT
Start: 2025-07-08 | End: 2025-07-09 | Stop reason: HOSPADM

## 2025-07-08 RX ORDER — PANTOPRAZOLE SODIUM 40 MG/10ML
40 INJECTION, POWDER, LYOPHILIZED, FOR SOLUTION INTRAVENOUS
Status: DISCONTINUED | OUTPATIENT
Start: 2025-07-09 | End: 2025-07-08

## 2025-07-08 RX ORDER — NAPROXEN SODIUM 220 MG/1
324 TABLET, FILM COATED ORAL ONCE
Status: DISCONTINUED | OUTPATIENT
Start: 2025-07-08 | End: 2025-07-08

## 2025-07-08 RX ORDER — PROMETHAZINE HYDROCHLORIDE 25 MG/1
25 SUPPOSITORY RECTAL EVERY 12 HOURS PRN
Status: DISCONTINUED | OUTPATIENT
Start: 2025-07-08 | End: 2025-07-09 | Stop reason: HOSPADM

## 2025-07-08 RX ORDER — LOSARTAN POTASSIUM 50 MG/1
50 TABLET ORAL 2 TIMES DAILY
Status: DISCONTINUED | OUTPATIENT
Start: 2025-07-08 | End: 2025-07-09 | Stop reason: HOSPADM

## 2025-07-08 RX ADMIN — DOCUSATE SODIUM 100 MG: 100 CAPSULE, LIQUID FILLED ORAL at 11:08

## 2025-07-08 RX ADMIN — Medication 125 MCG: at 11:08

## 2025-07-08 RX ADMIN — METOPROLOL TARTRATE 50 MG: 50 TABLET, FILM COATED ORAL at 11:08

## 2025-07-08 RX ADMIN — MUPIROCIN 1 APPLICATION: 20 OINTMENT TOPICAL at 11:08

## 2025-07-08 RX ADMIN — VITAM B12 100 MCG: 100 TAB at 11:08

## 2025-07-08 RX ADMIN — LOSARTAN POTASSIUM 50 MG: 50 TABLET, FILM COATED ORAL at 20:01

## 2025-07-08 RX ADMIN — ACETAMINOPHEN 650 MG: 325 TABLET ORAL at 14:32

## 2025-07-08 RX ADMIN — SODIUM CHLORIDE 100 ML/HR: 9 INJECTION, SOLUTION INTRAVENOUS at 06:16

## 2025-07-08 RX ADMIN — LOSARTAN POTASSIUM 50 MG: 50 TABLET, FILM COATED ORAL at 11:08

## 2025-07-08 RX ADMIN — METOPROLOL TARTRATE 50 MG: 50 TABLET, FILM COATED ORAL at 20:01

## 2025-07-08 RX ADMIN — MUPIROCIN 1 APPLICATION: 20 OINTMENT TOPICAL at 20:01

## 2025-07-08 SDOH — SOCIAL STABILITY: SOCIAL INSECURITY: DO YOU FEEL ANYONE HAS EXPLOITED OR TAKEN ADVANTAGE OF YOU FINANCIALLY OR OF YOUR PERSONAL PROPERTY?: NO

## 2025-07-08 SDOH — SOCIAL STABILITY: SOCIAL INSECURITY: HAS ANYONE EVER THREATENED TO HURT YOUR FAMILY OR YOUR PETS?: NO

## 2025-07-08 SDOH — ECONOMIC STABILITY: FOOD INSECURITY: HOW HARD IS IT FOR YOU TO PAY FOR THE VERY BASICS LIKE FOOD, HOUSING, MEDICAL CARE, AND HEATING?: NOT VERY HARD

## 2025-07-08 SDOH — ECONOMIC STABILITY: HOUSING INSECURITY: IN THE LAST 12 MONTHS, WAS THERE A TIME WHEN YOU WERE NOT ABLE TO PAY THE MORTGAGE OR RENT ON TIME?: NO

## 2025-07-08 SDOH — SOCIAL STABILITY: SOCIAL INSECURITY: HAVE YOU HAD THOUGHTS OF HARMING ANYONE ELSE?: YES

## 2025-07-08 SDOH — SOCIAL STABILITY: SOCIAL INSECURITY: ARE YOU OR HAVE YOU BEEN THREATENED OR ABUSED PHYSICALLY, EMOTIONALLY, OR SEXUALLY BY ANYONE?: NO

## 2025-07-08 SDOH — SOCIAL STABILITY: SOCIAL INSECURITY: WERE YOU ABLE TO COMPLETE ALL THE BEHAVIORAL HEALTH SCREENINGS?: YES

## 2025-07-08 SDOH — SOCIAL STABILITY: SOCIAL INSECURITY: DOES ANYONE TRY TO KEEP YOU FROM HAVING/CONTACTING OTHER FRIENDS OR DOING THINGS OUTSIDE YOUR HOME?: NO

## 2025-07-08 SDOH — SOCIAL STABILITY: SOCIAL INSECURITY: DO YOU FEEL UNSAFE GOING BACK TO THE PLACE WHERE YOU ARE LIVING?: NO

## 2025-07-08 SDOH — SOCIAL STABILITY: SOCIAL INSECURITY: ABUSE: ADULT

## 2025-07-08 SDOH — SOCIAL STABILITY: SOCIAL INSECURITY: ARE THERE ANY APPARENT SIGNS OF INJURIES/BEHAVIORS THAT COULD BE RELATED TO ABUSE/NEGLECT?: NO

## 2025-07-08 ASSESSMENT — PAIN SCALES - GENERAL
PAINLEVEL_OUTOF10: 0 - NO PAIN
PAINLEVEL_OUTOF10: 4
PAINLEVEL_OUTOF10: 0 - NO PAIN

## 2025-07-08 ASSESSMENT — ACTIVITIES OF DAILY LIVING (ADL)
WALKS IN HOME: INDEPENDENT
GROOMING: INDEPENDENT
HEARING - LEFT EAR: FUNCTIONAL
FEEDING YOURSELF: INDEPENDENT
BATHING: INDEPENDENT
LACK_OF_TRANSPORTATION: PATIENT DECLINED
HEARING - RIGHT EAR: FUNCTIONAL
TOILETING: INDEPENDENT
JUDGMENT_ADEQUATE_SAFELY_COMPLETE_DAILY_ACTIVITIES: YES
LACK_OF_TRANSPORTATION: PATIENT DECLINED
ADEQUATE_TO_COMPLETE_ADL: YES
LACK_OF_TRANSPORTATION: PATIENT DECLINED
ASSISTIVE_DEVICE: EYEGLASSES
DRESSING YOURSELF: INDEPENDENT
PATIENT'S MEMORY ADEQUATE TO SAFELY COMPLETE DAILY ACTIVITIES?: YES

## 2025-07-08 ASSESSMENT — COGNITIVE AND FUNCTIONAL STATUS - GENERAL
TURNING FROM BACK TO SIDE WHILE IN FLAT BAD: A LITTLE
DRESSING REGULAR LOWER BODY CLOTHING: A LITTLE
TURNING FROM BACK TO SIDE WHILE IN FLAT BAD: A LITTLE
DRESSING REGULAR UPPER BODY CLOTHING: A LITTLE
DRESSING REGULAR LOWER BODY CLOTHING: A LITTLE
STANDING UP FROM CHAIR USING ARMS: A LITTLE
DAILY ACTIVITIY SCORE: 18
CLIMB 3 TO 5 STEPS WITH RAILING: A LITTLE
CLIMB 3 TO 5 STEPS WITH RAILING: A LITTLE
WALKING IN HOSPITAL ROOM: A LITTLE
EATING MEALS: A LITTLE
HELP NEEDED FOR BATHING: A LITTLE
MOVING FROM LYING ON BACK TO SITTING ON SIDE OF FLAT BED WITH BEDRAILS: A LITTLE
PERSONAL GROOMING: A LITTLE
PERSONAL GROOMING: A LITTLE
EATING MEALS: A LITTLE
STANDING UP FROM CHAIR USING ARMS: A LITTLE
MOVING TO AND FROM BED TO CHAIR: A LITTLE
MOBILITY SCORE: 18
PATIENT BASELINE BEDBOUND: NO
DRESSING REGULAR UPPER BODY CLOTHING: A LITTLE
MOVING FROM LYING ON BACK TO SITTING ON SIDE OF FLAT BED WITH BEDRAILS: A LITTLE
TOILETING: A LITTLE
WALKING IN HOSPITAL ROOM: A LITTLE
MOVING TO AND FROM BED TO CHAIR: A LITTLE
MOBILITY SCORE: 18
HELP NEEDED FOR BATHING: A LITTLE
TOILETING: A LITTLE
DAILY ACTIVITIY SCORE: 18

## 2025-07-08 ASSESSMENT — PAIN - FUNCTIONAL ASSESSMENT
PAIN_FUNCTIONAL_ASSESSMENT: 0-10

## 2025-07-08 ASSESSMENT — PATIENT HEALTH QUESTIONNAIRE - PHQ9
2. FEELING DOWN, DEPRESSED OR HOPELESS: NOT AT ALL
SUM OF ALL RESPONSES TO PHQ9 QUESTIONS 1 & 2: 0
1. LITTLE INTEREST OR PLEASURE IN DOING THINGS: NOT AT ALL

## 2025-07-08 ASSESSMENT — PAIN DESCRIPTION - DESCRIPTORS: DESCRIPTORS: ACHING

## 2025-07-08 ASSESSMENT — LIFESTYLE VARIABLES
AUDIT-C TOTAL SCORE: -1
SKIP TO QUESTIONS 9-10: 0
AUDIT-C TOTAL SCORE: -1
HOW MANY STANDARD DRINKS CONTAINING ALCOHOL DO YOU HAVE ON A TYPICAL DAY: PATIENT DECLINED
HOW OFTEN DO YOU HAVE A DRINK CONTAINING ALCOHOL: NEVER
HOW OFTEN DO YOU HAVE 6 OR MORE DRINKS ON ONE OCCASION: NEVER

## 2025-07-08 NOTE — POST-PROCEDURE NOTE
Physician Transition of Care Summary  Invasive Cardiovascular Lab    Procedure Date: 7/8/2025  Attending: Panel 1:     * Blaine Ponce - Primary  Panel 2:     * Ortega Vivas - Primary  Resident/Fellow/Other Assistant: Surgeons and Role:  * No surgeons found with a matching role *    Indications:   Pre-op Diagnosis      * Aortic stenosis [I35.0]    Post-procedure diagnosis:   Post-op Diagnosis     * Aortic stenosis [I35.0]    Procedure(s):   TAVR (Transcatheter AV Replacement)  09220 - MI REPLACE AORTIC VALVE PERQ FEMORAL ARTRY APPROACH    TAVR-OR  66574 - MI REPLACE AORTIC VALVE PERQ FEMORAL ARTRY APPROACH    Description of the Procedure:   Indication: Severe Aortic Stenosis    Valve used: 26 mm Evolut FX+    Pre dil: True dilation balloon 18 mm    Access  Primary: right CFA s/p 2 proglide  Secondary: left CFA 6 [place] s/p 1 proglide    TVP: right IJ vein, 6 [place], kept in place.     Pre LVEDP: 11 mmhg  Post LVEDP: 10 mmhg    Post gradient TTE: 4  No PVL  No effusion    Complications:   New LBBB post valve implant    Stents/Implants:   Implants          Heart Valve Repair          Loading System, Evolut Fx, 23-29mm - [id number] - Implanted        Inventory item: LOADING SYSTEM, EVOLUT FX,  23-29MM Model/Cat number: [id number]    : [employer] Lot number: [id number]    Device identifier: [id number]        GUDID Information       Request status Successful        Brand name: Evolut™ FX Version/Model: [id number]-2329    Company name: [employer] MRI safety info as of 7/8/25: Labeling does not contain MRI Safety Information    Contains dry or latex rubber: No      GMDN P.T. name: Aortic transcatheter heart valve bioprosthesis, stent-like framework                As of 7/8/2025       Status: Implanted                           Type Not Specified          26mm Evolut Fx+ Transcatheter Aortic Valve System - Implanted        Model/Cat number: [id number] Serial  number: [id number]    Lot number: [id number]        As of 7/8/2025       Status: Implanted                            Anticoagulation/Antiplatelet Plan:   aspirin    Estimated Blood Loss:   * No values recorded between 7/8/2025  7:33 AM and 7/8/2025  9:32 AM *    Anesthesia: Moderate Sedation Anesthesia Staff: Perfusionist: Maryam Garcia    Any Specimen(s) Removed:   Order Name Source Comment Collection Info Order Time   TYPE AND SCREEN Blood, Venous  Collected By: Kimberly Casaletta, RN 7/8/2025  5:33 AM     Release result to [facility]   Immediate        COMPREHENSIVE METABOLIC PANEL Blood, Venous  Collected By: Kimberly Casaletta, RN 7/8/2025  5:33 AM     [facility]   Immediate        CBC Blood, Venous  Collected By: Kimberly Casaletta, RN 7/8/2025  5:33 AM     [facility]   Immediate        COAGULATION SCREEN Blood, Venous  Collected By: Kimberly Casaletta, RN 7/8/2025  5:33 AM     [facility]   Immediate          [facility]:   [facility]  Monitor access sites for bleeding  TTE tomorrow  Bed rest  Structural team will continue to follow.   page structural team for any concerning clinical events.     Electronically signed by: Miguel Lynch MD, 7/8/2025 9:32 AM

## 2025-07-08 NOTE — PROGRESS NOTES
"[facility] Critical Care Medicine       Date:  7/8/2025  Patient:  Alma Lane  YOB: 1950  MRN:  [id number]   Admit Date:  7/8/2025  ========================================================================================================    No chief complaint on file.        History of Present Illness:  Alma Lane is a 75 y.o. year old female patient with Past Medical History of hypertension, hyperlipidemia, aortic stenosis, diabetes, current smoker, COPD presented for scheduled TAVR.  Patient was evaluated in the office and after risk versus benefits were discussed it was decided to proceed with a TAVR. Procedure was without complication.  Bilateral femoral sites were accessed.  Right internal jugular TVP in place. Patient developed new LBBB in procedure. Patient will be admitted to the ICU post procedure.    Interval ICU Events:  7/8: admit to the ICU post TAVR. RIJ TVP in place.     Medical History:  Medical History[1]  Surgical History[2]  Prescriptions Prior to Admission[3]  Atorvastatin, Ondansetron, and Simvastatin  Social History[4]  Family History[5]    Review of Systems:  14 point review of systems was completed and negative except for those specially mention in my HPI    Physical Exam:    Heart Rate:  [64-74]   Temp:  [2.4 °C (36.3 °F)-36.5 °C (97.7 °F)]   Resp:  [18-25]   BP: (115-178)/(62-77)   Height:  [157.5 cm (5' 2\")]   Weight:  [71.4 kg (157 lb 6.5 oz)]   SpO2:  [96 %-100 %]     Physical Exam  Constitutional:       General: She is not in acute distress.     Appearance: She is not ill-appearing.   HENT:      Head: Normocephalic.      Mouth/Throat:      Mouth: Mucous membranes are dry.   Eyes:      Pupils: Pupils are equal, round, and reactive to light.   Cardiovascular:      Rate and Rhythm: Normal rate and regular rhythm.      Pulses: Normal pulses.      Heart sounds: Normal heart sounds.   Pulmonary:      Effort: Pulmonary effort is normal.      Breath sounds: Normal breath sounds. "   Abdominal:      General: Abdomen is flat. Bowel sounds are normal.      Palpations: Abdomen is soft.   Musculoskeletal:         General: Normal range of motion.   Skin:     General: Skin is warm and dry.      Capillary Refill: Capillary refill takes less than 2 seconds.      Comments: Bilateral femoral access sites clean, dry, and intact   Neurological:      Mental Status: She is alert and oriented to person, place, and time.         Objective:    I have reviewed all medications, laboratory results, and imaging pertinent for today's encounter    Assessment/Plan:    I am currently managing this critically ill patient for the following problems:    Neuro/Psych/Pain Ctrl/Sedation:  #Anxiety/Depression  #Acute Post Op Pain  -CAM ICU precautions  -Neuro checks    Respiratory/ENT:  #COPD  -On no nebs or inhalers  -Supplemental oxygen as needed to keep SPO2 >90%    Cardiovascular:  #Aortic Stenosis s/p TAVR   #Hx of HTN,HLD  #Current Smoker  -Continue home losartan, metoprolol, aspirin, crestor  -Echo in the AM  -TVP in place  -Cardiac surgery following  -Monitor for arrhythmias     GI:  #GERD  -Continue PPI  -Clear liquid diet    Renal/Volume Status (Intra & Extravascular):  #CKD Stage III  -Daily BMP  -Given 500mL IVF in cath lab  -Monitor and replace electrolytes  -Strict intake and output  -Avoid nephrotoxic agents    Endocrine  #Diabetes Mellitus  -SSI, Accuchecks ACHS  -Hypoglycemic protocol    Infectious Disease:  -No active issues    Heme/Onc:  -No active issues    OBGYN/MSK:  -No active issues    Ethics/Code Status:  Full code    :  DVT Prophylaxis: holding  GI Prophylaxis: home PPI  Bowel Regimen: colace BID  Diet: CLD  CVC: TVP VERONICA Rodriguez: guanaco  Rodriguez: no  Restraints: no  Dispo: ICU    Critical Care Time:  45 minutes spent in preparing to see patient (I.e. review of medical records), evaluation of diagnostics (I.e. labs, imaging, etc.), documentation, discussing plan of care with patient/ family/  caregiver, and/ or coordination of care with multidisciplinary team. Time does not include completion of procedure time.      Teresa Loo, APRN-CNP          [1]   Past Medical History:  Diagnosis Date    Breast cancer     Carotid artery occlusion     Chronic kidney disease     Chronic obstructive pulmonary disease with (acute) exacerbation (Multi) 04/15/2022    Acute exacerbation of chronic obstructive pulmonary disease (COPD)    Diabetes mellitus (Multi)     Hx antineoplastic chemo     Hyperlipidemia     Hypertension     Personal history of diseases of the blood and blood-forming organs and certain disorders involving the immune mechanism     History of anemia    Personal history of irradiation     Personal history of other diseases of the musculoskeletal system and connective tissue     History of arthritis    Personal history of other diseases of the respiratory system     History of chronic obstructive lung disease    Personal history of other diseases of the respiratory system     History of upper respiratory infection    Personal history of other diseases of the respiratory system     History of acute pharyngitis    Personal history of other diseases of the respiratory system 10/25/2016    History of acute pharyngitis    Personal history of other diseases of the respiratory system     History of pulmonary emphysema    Personal history of other endocrine, nutritional and metabolic disease     History of hyperlipidemia    Personal history of other endocrine, nutritional and metabolic disease     History of diabetes mellitus    Personal history of other specified conditions     History of chest pain    Shortness of breath     Shortness of breath at rest   [2]   Past Surgical History:  Procedure Laterality Date    APPENDECTOMY  10/25/2016    Appendectomy    BREAST LUMPECTOMY      BREAST SURGERY  10/25/2016    Breast Surgery    CARDIAC CATHETERIZATION Left 5/14/2025    Procedure: LHC, With LV;  Surgeon:  Blaine Ponce MD;  Location: [facility];  Service: Cardiovascular;  Laterality: Left;  holding    COLONOSCOPY  10/25/2016    Complete Colonoscopy    CT ANGIO NECK  10/04/2021    CT NECK ANGIO W AND WO IV CONTRAST 10/4/2021 [facility]    MOUTH SURGERY  10/25/2016    Oral Surgery Tooth Extraction    OTHER SURGICAL HISTORY  2022    Ganglion cyst excision   [3]   Facility-Administered Medications Prior to Admission   Medication Dose Route Frequency Provider Last Rate Last Admin    sodium chloride 0.9% infusion  100 mL/hr intravenous Once Blaine Ponce MD         Medications Prior to Admission   Medication Sig Dispense Refill Last Dose/Taking    aspirin 81 mg chewable tablet Chew and swallow 1 tablet (81 mg) once daily.   2025 Morning    cholecalciferol (Vitamin D-3) 125 MCG (5000 UT) capsule Take 1 capsule (125 mcg) by mouth once daily.   2025    co-enzyme Q-10 50 mg capsule Take 2 capsules (100 mg) by mouth once daily.   2025    cyanocobalamin (Vitamin B-12) 100 mcg tablet Take 1 tablet (100 mcg) by mouth once daily.   2025    losartan (Cozaar) 50 mg tablet Take 1 tablet (50 mg) by mouth 2 times a day. 180 tablet 3 2025    metoprolol tartrate (Lopressor) 50 mg tablet Take 1 tablet by mouth 2 times a day. 180 tablet 1 2025 Morning    pantoprazole (ProtoNix) 20 mg EC tablet 1 po qd (Patient taking differently: Take 1 tablet (20 mg) by mouth if needed. 1 po qd) 100 tablet 4 2025 Morning    pioglitazone (Actos) 30 mg tablet 1 by mouth daily, prescribed by endocrinology.   2025    rosuvastatin (Crestor) 20 mg tablet TAKE ONE TABLET BY MOUTH EVERY DAY 90 tablet 3 2025 Morning    [] acetaminophen (Tylenol) 500 mg tablet Take 2 tablets (1,000 mg) by mouth every 6 hours if needed for mild pain (1 - 3) for up to 10 days. 30 tablet 0     benzonatate (Tessalon) 100 mg capsule Take 1 capsule (100 mg) by mouth 3 times a day as needed for cough. Do not  crush or chew. 42 capsule 1     Blood glucose monitoring meter Used to check sugar up to 6 times a day as needed. 1 each 0     blood sugar diagnostic (Blood Glucose Test) strip Used to test sugars up to 6 times daily as needed. 600 strip 3     lancets misc Use to test sugar up to 6 times daily as needed and directed 600 each 3     metoclopramide (Reglan) 10 mg tablet Take 1 tablet (10 mg) by mouth every 6 hours for 4 days. 16 tablet 0     naloxone (Narcan) 4 mg/0.1 mL nasal spray Administer 1 spray (4 mg) into affected nostril(s) if needed for opioid reversal or respiratory depression for up to 1 dose. May repeat every 2-3 minutes if needed, alternating nostrils, until medical assistance becomes available. 1 each 0    [4]   Social History  Tobacco Use    Smoking status: Every Day     Current packs/day: 0.50     Average packs/day: 0.5 packs/day for 56.5 years (28.3 ttl pk-yrs)     Types: Cigarettes     Start date: 1969     Passive exposure: Past    Smokeless tobacco: Never    Tobacco comments:     Last smoked July 8, 2025   Vaping Use    Vaping status: Never Used   Substance Use Topics    Alcohol use: Not Currently    Drug use: Not Currently   [5]   Family History  Problem Relation Name Age of Onset    No Known Problems Mother      Cancer Father      Diabetes Father

## 2025-07-08 NOTE — Clinical Note
Sheath was exchanged in the right femoral artery with SHEATH, SULLY, W/.038 GUIDEWIRE, 10 CM,  8FR INTRODUCER, 8FR RIAN, 2.5 CM DIALATOR.

## 2025-07-08 NOTE — Clinical Note
Catheter inserted. Into the RFA Subjective   Patient ID: Julia Antonio is a 78 y.o. female who presents for No chief complaint on file..    HPI   Patient comes in to discuss her bone density report.  It did show that she has osteoporosis.  Most significant in her left femoral neck.  Review of Systems   Constitutional:  Negative for activity change, appetite change, fatigue and fever.   HENT:  Negative for congestion.    Respiratory:  Negative for cough, choking and chest tightness.    Cardiovascular:  Negative for chest pain, palpitations and leg swelling.   Musculoskeletal:  Negative for arthralgias, back pain and gait problem.   Skin:  Negative for color change and pallor.   Neurological:  Negative for dizziness, facial asymmetry, light-headedness and headaches.       Objective   There were no vitals taken for this visit.  BSA There is no height or weight on file to calculate BSA.      Physical Exam  Constitutional:       General: She is not in acute distress.     Appearance: Normal appearance. She is not toxic-appearing.   HENT:      Head: Normocephalic.      Right Ear: Tympanic membrane, ear canal and external ear normal.      Left Ear: Tympanic membrane, ear canal and external ear normal.   Eyes:      Conjunctiva/sclera: Conjunctivae normal.      Pupils: Pupils are equal, round, and reactive to light.   Cardiovascular:      Rate and Rhythm: Normal rate and regular rhythm.      Pulses: Normal pulses.      Heart sounds: Normal heart sounds.   Pulmonary:      Effort: No respiratory distress.      Breath sounds: No wheezing, rhonchi or rales.   Musculoskeletal:         General: No swelling or tenderness.   Skin:     Findings: No lesion or rash.   Neurological:      General: No focal deficit present.      Mental Status: She is alert and oriented to person, place, and time. Mental status is at baseline.      Gait: Gait normal.   Psychiatric:         Mood and Affect: Mood normal.         Behavior: Behavior normal.         Thought Content:  Thought content normal.         Judgment: Judgment normal.     Office Visit on 08/17/2023   Component Date Value Ref Range Status   • Pathology Report 08/17/2023    Final                    Value:    Accession #: [id number]     Date of Procedure:  8/17/2023       Pathologist: [facility], Cytology  Date Reported: 8/24/2023  Date Received:  8/17/2023  Submitting Physician: NORY TRUONG D.O.                    FINAL CYTOLOGICAL INTERPRETATION        A.  THINPREP PAP CERVICAL:              Specimen Adequacy:       SATISFACTORY FOR EVALUATION.       Quality Indicator: Endocervical/transformation zone component cannot be  assessed because of severe atrophy.              General Categorization:       NEGATIVE FOR INTRAEPITHELIAL LESION OR MALIGNANCY.                            HIGH RISK HPV TEST RESULT:                       HPV GENOTYPE  16                      NEGATIVE       HPV GENOTYPE  18                      NEGATIVE       HPV GENOTYPE  OTHER             NEGATIVE              Reference Range: Negative                  Slide(s) initially screened by a Cytotechnologist at [facility], [address] [place], [place] [id number]    Testing for high-risk (HR) type of human papilloma virus (HPV) is performed by  the Roche fabi HPV Test.  The fabi HPV Test is a qualitative polymerase chain  reaction that amplifies DNA of HPV16, HPV18 and 12 other high-risk HPV types  (31, 33, 35, 39, 45, 51, 52, 56, 58, 59, 66, and 68) associated with cervical  cancer and its precursor lesions.  A positive result indicates the presence of  HPV DNA due to one or more of the 14 genotypes: 16, 18, 31, 33, 35, 39, 45, 51,  52, 56, 58, 59, 66, and 68. Negative results indicate HPV DNA concentrations  are undetectable or below the pre-set threshold for detection. False negative  results may be associated with unoptimized sampling. A negative HR HPV  result  does not exclude the possibility of future cytologic HSIL or underlying CIN2-3  or cancer.    This test is approved for cervical specimens by  the US Food and Drug  Administration. Results of this test should be interpreted in conjunction with  the patient’s Pap test re                          sults.  Please refer to ASCCP current guidelines for  the use of HPV DNA testing, result interpretation, and patient management.   The performance of this test was verified by the Molecular Diagnostic  Laboratory at [facility]. The lab is  certified under the Clinical Laboratory Amendments of 1988 (CLIA 88) as  qualified to perform high complexity clinical laboratory testing.    This specimen has been analyzed by the [employer] Imaging System ([employer]  an automated imaging and review system, which assists the laboratory in  evaluating cells on ThinPrep Pap tests. Following automated imaging, selected  fields from every slide were reviewed by a cytotechnologist and/or pathologist.    Electronically Signed Out By [facility], Cytology//LSM   By the signature on this report, the individual or group listed as making the  Final Interpretation/Diagnosis certifies that they have reviewed this case.  Diagnostic interpretation perfo                          rmed at [facility] [address] [place] [address]  Educational Note:  Cervical cytology is a screening procedure primarily for squamous cancers and  precursors and has associated false-negative and false-positive results as  evidenced by published data.  Your patient’s test should be interpreted in this  context, together with patient’s history and clinical findings.  Regular  sampling and follow-up of unexplained clinical signs and symptoms are  recommended to minimize false negative results.         Clinical History  Date of Last Menstrual Period:     Not provided    Other Clinical  Conditions:  HPV Test for All Interpretations - Include HPV Genotype    EPIC Order Description: GYNECOLOGIC CYTOLOGY CONSULTATION  Specimen Information: ThinPrep, CERVIX, SCREENING  Clinical Diagnosis History: Z12.4-Screening for cervical cancer  Perform HPV HR test? Always (all interpretations)  Include HPV Genotype? Yes   Source of Specimen  A: THINPREP PAP CERVICAL            [facility]  Department of Pathology   [address] [place] [address]       • POC Specific Gravity, Urine 08/17/2023 >=1.030  1.005 - 1.035 Final   • POC PH, Urine 08/17/2023 5.5  No Reference Range Established PH Final   • POC Protein, Urine 08/17/2023 NEGATIVE  NEGATIVE, 30 (1+) mg/dl Final   • POC Glucose, Urine 08/17/2023 NEGATIVE  NEGATIVE mg/dl Final   • POC Blood, Urine 08/17/2023 NEGATIVE  NEGATIVE Final   • POC Ketones, Urine 08/17/2023 NEGATIVE  NEGATIVE mg/dl Final   • POC Bilirubin, Urine 08/17/2023 NEGATIVE  NEGATIVE Final   • POC Urobilinogen, Urine 08/17/2023 0.2  0.2, 1.0 EU/DL Final   • Poc Nitrate, Urine 08/17/2023 NEGATIVE  NEGATIVE Final   • POC Leukocytes, Urine 08/17/2023 NEGATIVE  NEGATIVE Final   Lab on 07/07/2023   Component Date Value Ref Range Status   • WBC 07/07/2023 5.4  4.4 - 11.3 x10E9/L Final   • nRBC 07/07/2023 0.0  0.0 - 0.0 /100 WBC Final   • RBC 07/07/2023 4.43  4.00 - 5.20 x10E12/L Final   • Hemoglobin 07/07/2023 13.0  12.0 - 16.0 g/dL Final   • Hematocrit 07/07/2023 41.4  36.0 - 46.0 % Final   • MCV 07/07/2023 93  80 - 100 fL Final   • MCHC 07/07/2023 31.4 (L)  32.0 - 36.0 g/dL Final   • Platelets 07/07/2023 218  150 - 450 x10E9/L Final   • RDW 07/07/2023 12.8  11.5 - 14.5 % Final   • Neutrophils % 07/07/2023 58.8  40.0 - 80.0 % Final   • Immature Granulocytes %, Automated 07/07/2023 0.2  0.0 - 0.9 % Final     Immature Granulocyte Count (IG) includes promyelocytes,    myelocytes and metamyelocytes but does not include bands.   Percent  differential counts (%) should be interpreted in the   context of the absolute cell counts (cells/L).   • Lymphocytes % 07/07/2023 27.3  13.0 - 44.0 % Final   • Monocytes % 07/07/2023 11.6  2.0 - 10.0 % Final   • Eosinophils % 07/07/2023 1.7  0.0 - 6.0 % Final   • Basophils % 07/07/2023 0.4  0.0 - 2.0 % Final   • Neutrophils Absolute 07/07/2023 3.20  1.60 - 5.50 x10E9/L Final   • Lymphocytes Absolute 07/07/2023 1.48  0.80 - 3.00 x10E9/L Final   • Monocytes Absolute 07/07/2023 0.63  0.05 - 0.80 x10E9/L Final   • Eosinophils Absolute 07/07/2023 0.09  0.00 - 0.40 x10E9/L Final   • Basophils Absolute 07/07/2023 0.02  0.00 - 0.10 x10E9/L Final   • Glucose 07/07/2023 93  74 - 99 mg/dL Final   • Sodium 07/07/2023 140  136 - 145 mmol/L Final   • Potassium 07/07/2023 4.8  3.5 - 5.3 mmol/L Final   • Chloride 07/07/2023 103  98 - 107 mmol/L Final   • Bicarbonate 07/07/2023 28  21 - 32 mmol/L Final   • Anion Gap 07/07/2023 14  10 - 20 mmol/L Final   • Urea Nitrogen 07/07/2023 14  6 - 23 mg/dL Final   • Creatinine 07/07/2023 0.71  0.50 - 1.05 mg/dL Final   • GFR Female 07/07/2023 86  >90 mL/min/1.73m2 Final     CALCULATIONS OF ESTIMATED GFR ARE PERFORMED   USING THE 2021 CKD-EPI STUDY REFIT EQUATION   WITHOUT THE RACE VARIABLE FOR THE IDMS-TRACEABLE   CREATININE METHODS.    https://jasn.asnjournals.org/content/early/2021/09/22/ASN.8492379164   • Calcium 07/07/2023 9.7  8.6 - 10.6 mg/dL Final   • Albumin 07/07/2023 4.6  3.4 - 5.0 g/dL Final   • Alkaline Phosphatase 07/07/2023 75  33 - 136 U/L Final   • Total Protein 07/07/2023 7.1  6.4 - 8.2 g/dL Final   • AST 07/07/2023 24  9 - 39 U/L Final   • Total Bilirubin 07/07/2023 0.6  0.0 - 1.2 mg/dL Final   • ALT (SGPT) 07/07/2023 20  7 - 45 U/L Final     Patients treated with Sulfasalazine may generate    falsely decreased results for ALT.   • Cholesterol 07/07/2023 190  0 - 199 mg/dL Final    .      AGE      DESIRABLE   BORDERLINE HIGH   HIGH     0-19 Y     0 - 169       170 - 199      >/= 200    20-24 Y     0 - 189       190 - 224     >/= 225         >24 Y     0 - 199       200 - 239     >/= 240   **All ranges are based on fasting samples. Specific   therapeutic targets will vary based on patient-specific   cardiac risk.  .   Pediatric guidelines reference:Pediatrics 2011, 128(S5).   Adult guidelines reference: NCEP ATPIII Guidelines,     SAURABH 2001, 258:2486-97  .   Venipuncture immediately after or during the    administration of Metamizole may lead to falsely   low results. Testing should be performed immediately   prior to Metamizole dosing.   • HDL 07/07/2023 59.6  mg/dL Final    .      AGE      VERY LOW   LOW     NORMAL    HIGH       0-19 Y       < 35   < 40     40-45     ----    20-24 Y       ----   < 40       >45     ----      >24 Y       ----   < 40     40-60      >60  .   • Cholesterol/HDL Ratio 07/07/2023 3.2   Final    REF VALUES  DESIRABLE  < 3.4  HIGH RISK  > 5.0   • LDL 07/07/2023 97  0 - 99 mg/dL Final    .                           NEAR      BORD      AGE      DESIRABLE  OPTIMAL    HIGH     HIGH     VERY HIGH     0-19 Y     0 - 109     ---    110-129   >/= 130     ----    20-24 Y     0 - 119     ---    120-159   >/= 160     ----      >24 Y     0 -  99   100-129  130-159   160-189     >/=190  .   • VLDL 07/07/2023 33  0 - 40 mg/dL Final   • Triglycerides 07/07/2023 167 (H)  0 - 149 mg/dL Final    .      AGE      DESIRABLE   BORDERLINE HIGH   HIGH     VERY HIGH   0 D-90 D    19 - 174         ----         ----        ----  91 D- 9 Y     0 -  74        75 -  99     >/= 100      ----    10-19 Y     0 -  89        90 - 129     >/= 130      ----    20-24 Y     0 - 114       115 - 149     >/= 150      ----         >24 Y     0 - 149       150 - 199    200- 499    >/= 500  .   Venipuncture immediately after or during the    administration of Metamizole may lead to falsely   low results. Testing should be performed immediately   prior to Metamizole dosing.   • TSH 07/07/2023 2.62  0.44 -  3.98 mIU/L Final     TSH testing is performed using different testing    methodology at [facility] than at other    [facility]. Direct result comparisons should    only be made within the same method.   • Vitamin D, 25-Hydroxy 07/07/2023 25 (A)  ng/mL Final    .  DEFICIENCY:         < 20   NG/ML  INSUFFICIENCY:      20-29  NG/ML  SUFFICIENCY:         NG/ML    THIS ASSAY ACCURATELY QUANTIFIES THE SUM OF  VITAMIN D3, 25-HYDROXY AND VIT D2,25-HYDROXY.   • Vitamin B-12 07/07/2023 337  211 - 911 pg/mL Final     Current Outpatient Medications on File Prior to Visit   Medication Sig Dispense Refill   • ezetimibe (Zetia) 10 mg tablet Take 1 tablet (10 mg) by mouth once daily. 30 tablet 11   • ibuprofen 600 mg tablet TAKE ONE TABLET BY MOUTH THREE TIMES DAILY WITH FOOD AS NEEDED     • Livalo 4 mg tablet Take 25 mg by mouth once daily.     • pantoprazole (ProtoNix) 40 mg EC tablet TAKE 1 TABLET (40 MG) BY MOUTH DAILY DO NOT CRUSH, CHEW, OR SPLIT 90 tablet 1     No current facility-administered medications on file prior to visit.     No images are attached to the encounter.            Assessment/Plan   Diagnoses and all orders for this visit:  Age related osteoporosis, unspecified pathological fracture presence  1.  Discussion with patient regarding taking at least 1200 mg of calcium daily as well as at least 1000 IU vitamin D3 daily patient is encouraged to do 30 minutes of weightbearing exercise daily  2.  Patient was given options about bone building medication  3.  Patient to get another bone density performed in 2 years  4.  Patient to call for questions or concerns

## 2025-07-08 NOTE — NURSING NOTE
Patient arrived to SICU bed 15 from OR. Patient placed on ICU monitors, cath sites checked and assessment complete. CT team and ICU team updated that patient is on unit

## 2025-07-08 NOTE — CARE PLAN
The patient's goals for the shift include      The clinical goals for the shift include keep pain under control      Problem: Skin  Goal: Participates in plan/prevention/treatment measures  Flowsheets (Taken 7/8/2025 1141)  Participates in plan/prevention/treatment measures: Increase activity/out of bed for meals  Goal: Prevent/manage excess moisture  Flowsheets (Taken 7/8/2025 1141)  Prevent/manage excess moisture: Monitor for/manage infection if present

## 2025-07-08 NOTE — PRE-SEDATION DOCUMENTATION
Patient: Alma Lane  MRN: [id number]  NPO guidelines met: Yes    Physical Exam    Airway  Mallampati: III     Cardiovascular    Dental    Pulmonary        Plan    ASA 3     Mild

## 2025-07-08 NOTE — H&P
History Of Present Illness  Alma Lane is a 75 y.o. year old female patient with history of Carotid Stenosis, Diabetes Mellitus, Hyperlipidemia, Hypertension, overweight, unfortunately a current smoker.   Referred by Dr. Eb MD for Severe Aortic Valve Stenosis.  Valve and Structural Heart Work Up  KCCQ/Walk done  STS Isolated AVR  Procedure Type: Isolated AVR  Perioperative OutcomeEstimate %  Operative Mortality4.96%  -LHC 5/14/2025  -TTE 5/14/2025 EF 60-65%, moderate AI, DI 0.27, valve area 0.69,  -TAVR CT 5/17/2025 Aortic valve calcium score 1585   -Dental clearance: Patient follows with [facility]- will fax this visit to their office and ask for clearance.   -Moderate surgical risk, favor transcatheter  -Carotid US pending       Past Medical History  Medical History[1]    Surgical History  Surgical History[2]     Social History  She reports that she has been smoking cigarettes. She started smoking about 56 years ago. She has a 28.3 pack-year smoking history. She has been exposed to tobacco smoke. She has never used smokeless tobacco. She reports that she does not currently use alcohol. She reports that she does not currently use drugs.    Family History  Family History[3]     Allergies  Atorvastatin, Ondansetron, and Simvastatin    Review of Systems   Full 14 point ROS complete and negative except as noted above.    Physical Exam   Physical Exam:     Vitals:    07/08/25 0555   BP: 175/62   Pulse: 66   Resp: 18   Temp: 36.3 °C (97.3 °F)   SpO2: 96%        General:  Alert, calm, well-developed   HEENT:  Pupils equal, round, reactive to light and accommodation. Extraocular movements   Neck:  Supple, without lymphadenopathy or thyromegaly. No carotid bruits.  Lymph:  No axillary, cervical, supraclavicular, pre-auricular, submental, or occipital lymphadenopathy,  Cardiovascular:  Regular rate and rhythm, with normal S1 and S2. Aortic murmurs 3/6, no rubs or gallops. No JVD. 2+ pulses  "bilaterally - dorsalis pedis and radial.  Lungs:  lung clear. No wheezes. No accessory muscle use or cyanosis. No tenderness to palpation.  Abdomen:  Normoactive bowel sounds. Soft, flat, non-tender, and non-distended. No hepatosplenomegaly; liver span approximately 10 cm.  Skin:  Warm, dry, well-perfused. No rashes or other lesions.  Extremities:  2+ pulses in upper and lower extremities. No lower extremity pain or edema; legs are symmetric in appearance.  Neuro:  Alert and oriented to person, place, and time. Able to communicate well. 5/5 strength in all extremities bilaterally. Sensation intact in all extremities. Normal gait.     Last Recorded Vitals  Blood pressure 175/62, pulse 66, temperature 36.3 °C (97.3 °F), temperature source Temporal, resp. rate 18, height 1.575 m (5' 2\"), weight 71.4 kg (157 lb 6.5 oz), SpO2 96%.    Relevant Results    Last I/O:  No intake/output data recorded.    Last Labs:  eGFR Cre: 35 at 7/8/2025  5:59 AM  Calculated from:  Serum Creatinine: 1.51 mg/dL at 7/8/2025  5:59 AM  Age: 75 years  Sex: Female at 7/8/2025  5:59 AM  Calculated using the CKD-EPI Creatinine Equation (2021)    CBC - 7/8/2025:  5:59 AM  8.5 10.3 261    32.7      BMP  137  107  28                  ----------------<133     4.2  25  1.51     CMP - 7/8/2025:  5:59 AM  9.0 7.0 14 --- 0.3   3.4 3.6 13 54      PTT - 7/8/2025:  5:59 AM  1.0   11.5 32     Troponin I, High Sensitivity   Date/Time Value Ref Range Status   06/11/2025 05:18 PM 11 0 - 13 ng/L Final     BNP   Date/Time Value Ref Range Status   09/06/2021 01:15  (H) 0 - 99 pg/mL Final     Comment:     .  <100 pg/mL - Heart failure unlikely  100-299 pg/mL - Intermediate probability of acute heart  .               failure exacerbation. Correlate with clinical  .               context and patient history.    >=300 pg/mL - Heart Failure likely. Correlate with clinical  .               context and patient history.  BNP testing is performed using different " testing   methodology at [facility] than at other   [facility] hospitals. Direct result comparisons should   only be made within the same method.          Ejection Fractions:  EF   Date/Time Value Ref Range Status   11/29/2024 02:31 PM 63 %         Assessment & Plan  Aortic stenosis      Group consensus recommendation:   Carotid ultrasound pending. Imaging reviewed.   Borderline transfemoral access.  Team feels can still do RFP.  Area 391  chad 72 sinus 31 STJ 25 Evolut 26 FX.     Plan to proceed with transfemoral TAVR today.    I spent 30 minutes in the professional and overall care of this patient.      Miguel Lynch MD         [1]   Past Medical History:  Diagnosis Date    Breast cancer     Carotid artery occlusion     Chronic kidney disease     Chronic obstructive pulmonary disease with (acute) exacerbation (Multi) 04/15/2022    Acute exacerbation of chronic obstructive pulmonary disease (COPD)    Diabetes mellitus (Multi)     Hx antineoplastic chemo     Hyperlipidemia     Hypertension     Personal history of diseases of the blood and blood-forming organs and certain disorders involving the immune mechanism     History of anemia    Personal history of irradiation     Personal history of other diseases of the musculoskeletal system and connective tissue     History of arthritis    Personal history of other diseases of the respiratory system     History of chronic obstructive lung disease    Personal history of other diseases of the respiratory system     History of upper respiratory infection    Personal history of other diseases of the respiratory system     History of acute pharyngitis    Personal history of other diseases of the respiratory system 10/25/2016    History of acute pharyngitis    Personal history of other diseases of the respiratory system     History of pulmonary emphysema    Personal history of other endocrine, nutritional and metabolic disease     History of hyperlipidemia     Personal history of other endocrine, nutritional and metabolic disease     History of diabetes mellitus    Personal history of other specified conditions     History of chest pain    Shortness of breath     Shortness of breath at rest   [2]   Past Surgical History:  Procedure Laterality Date    APPENDECTOMY  10/25/2016    Appendectomy    BREAST LUMPECTOMY      BREAST SURGERY  10/25/2016    Breast Surgery    CARDIAC CATHETERIZATION Left 5/14/2025    Procedure: LHC, With LV;  Surgeon: Blaine Ponce MD;  Location: [facility];  Service: Cardiovascular;  Laterality: Left;  holding    COLONOSCOPY  10/25/2016    Complete Colonoscopy    CT ANGIO NECK  10/04/2021    CT NECK ANGIO W AND WO IV CONTRAST 10/4/2021 [facility]    MOUTH SURGERY  10/25/2016    Oral Surgery Tooth Extraction    OTHER SURGICAL HISTORY  01/21/2022    Ganglion cyst excision   [3]   Family History  Problem Relation Name Age of Onset    No Known Problems Mother      Cancer Father      Diabetes Father

## 2025-07-08 NOTE — Clinical Note
Sheath was exchanged in the right femoral vein with INTRODUCER, SHEATH,  HEMO VALVE, W/ LUER LOCK HUB, SIDEPORT,  6FR X 12CM.

## 2025-07-08 NOTE — Clinical Note
Detail Level: Detailed Temporary pacemaker tested. Heart rate: 190. Patient Specific Counseling (Will Not Stick From Patient To Patient): 2.8 x 3 cm recommended scheduling with Anand Valenzuela for excision if desired, or he can observe as he says it hasn't gotten inflamed in a long time

## 2025-07-09 ENCOUNTER — APPOINTMENT (OUTPATIENT)
Dept: RADIOLOGY | Facility: HOSPITAL | Age: 75
DRG: 267 | End: 2025-07-09
Payer: MEDICARE

## 2025-07-09 ENCOUNTER — APPOINTMENT (OUTPATIENT)
Dept: CARDIOLOGY | Facility: HOSPITAL | Age: 75
DRG: 267 | End: 2025-07-09
Payer: MEDICARE

## 2025-07-09 ENCOUNTER — PHARMACY VISIT (OUTPATIENT)
Dept: PHARMACY | Facility: CLINIC | Age: 75
End: 2025-07-09
Payer: COMMERCIAL

## 2025-07-09 VITALS
SYSTOLIC BLOOD PRESSURE: 122 MMHG | TEMPERATURE: 97.7 F | WEIGHT: 157.85 LBS | DIASTOLIC BLOOD PRESSURE: 50 MMHG | RESPIRATION RATE: 25 BRPM | OXYGEN SATURATION: 98 % | BODY MASS INDEX: 29.05 KG/M2 | HEART RATE: 82 BPM | HEIGHT: 62 IN

## 2025-07-09 LAB
ANION GAP SERPL CALC-SCNC: 10 MMOL/L (ref 10–20)
AORTIC VALVE MEAN GRADIENT: 9 MMHG
AORTIC VALVE PEAK VELOCITY: 1.88 M/S
ATRIAL RATE: 63 BPM
ATRIAL RATE: 87 BPM
AV PEAK GRADIENT: 14 MMHG
AVA (PEAK VEL): 3.03 CM2
AVA (VTI): 2.68 CM2
BASOPHILS # BLD AUTO: 0.01 X10*3/UL (ref 0–0.1)
BASOPHILS NFR BLD AUTO: 0.1 %
BUN SERPL-MCNC: 19 MG/DL (ref 6–23)
CALCIUM SERPL-MCNC: 8.4 MG/DL (ref 8.6–10.3)
CHLORIDE SERPL-SCNC: 107 MMOL/L (ref 98–107)
CO2 SERPL-SCNC: 22 MMOL/L (ref 21–32)
CREAT SERPL-MCNC: 1.3 MG/DL (ref 0.5–1.05)
EGFRCR SERPLBLD CKD-EPI 2021: 42 ML/MIN/1.73M*2
EJECTION FRACTION APICAL 4 CHAMBER: 65.5
EJECTION FRACTION: 63 %
EOSINOPHIL # BLD AUTO: 0.17 X10*3/UL (ref 0–0.4)
EOSINOPHIL NFR BLD AUTO: 2 %
ERYTHROCYTE [DISTWIDTH] IN BLOOD BY AUTOMATED COUNT: 15.7 % (ref 11.5–14.5)
GLUCOSE BLD MANUAL STRIP-MCNC: 123 MG/DL (ref 74–99)
GLUCOSE BLD MANUAL STRIP-MCNC: 184 MG/DL (ref 74–99)
GLUCOSE SERPL-MCNC: 109 MG/DL (ref 74–99)
HCT VFR BLD AUTO: 28.6 % (ref 36–46)
HGB BLD-MCNC: 9.1 G/DL (ref 12–16)
HOLD SPECIMEN: NORMAL
IMM GRANULOCYTES # BLD AUTO: 0.03 X10*3/UL (ref 0–0.5)
IMM GRANULOCYTES NFR BLD AUTO: 0.4 % (ref 0–0.9)
LEFT VENTRICLE INTERNAL DIMENSION DIASTOLE: 3.05 CM (ref 3.5–6)
LEFT VENTRICULAR OUTFLOW TRACT DIAMETER: 1.99 CM
LV EJECTION FRACTION BIPLANE: 65 %
LYMPHOCYTES # BLD AUTO: 2.27 X10*3/UL (ref 0.8–3)
LYMPHOCYTES NFR BLD AUTO: 27 %
MAGNESIUM SERPL-MCNC: 1.86 MG/DL (ref 1.6–2.4)
MCH RBC QN AUTO: 30 PG (ref 26–34)
MCHC RBC AUTO-ENTMCNC: 31.8 G/DL (ref 32–36)
MCV RBC AUTO: 94 FL (ref 80–100)
MONOCYTES # BLD AUTO: 0.92 X10*3/UL (ref 0.05–0.8)
MONOCYTES NFR BLD AUTO: 11 %
NEUTROPHILS # BLD AUTO: 5 X10*3/UL (ref 1.6–5.5)
NEUTROPHILS NFR BLD AUTO: 59.5 %
NRBC BLD-RTO: 0 /100 WBCS (ref 0–0)
P AXIS: 49 DEGREES
P AXIS: 51 DEGREES
P OFFSET: 192 MS
P OFFSET: 206 MS
P ONSET: 143 MS
P ONSET: 161 MS
PLATELET # BLD AUTO: 198 X10*3/UL (ref 150–450)
POTASSIUM SERPL-SCNC: 4.3 MMOL/L (ref 3.5–5.3)
PR INTERVAL: 132 MS
PR INTERVAL: 158 MS
Q ONSET: 222 MS
Q ONSET: 227 MS
QRS COUNT: 10 BEATS
QRS COUNT: 14 BEATS
QRS DURATION: 74 MS
QRS DURATION: 84 MS
QT INTERVAL: 370 MS
QT INTERVAL: 414 MS
QTC CALCULATION(BAZETT): 423 MS
QTC CALCULATION(BAZETT): 445 MS
QTC FREDERICIA: 418 MS
QTC FREDERICIA: 420 MS
R AXIS: 33 DEGREES
R AXIS: 43 DEGREES
RBC # BLD AUTO: 3.03 X10*6/UL (ref 4–5.2)
RIGHT VENTRICLE PEAK SYSTOLIC PRESSURE: 33 MMHG
SODIUM SERPL-SCNC: 135 MMOL/L (ref 136–145)
T AXIS: 51 DEGREES
T AXIS: 73 DEGREES
T OFFSET: 412 MS
T OFFSET: 429 MS
VENTRICULAR RATE: 63 BPM
VENTRICULAR RATE: 87 BPM
WBC # BLD AUTO: 8.4 X10*3/UL (ref 4.4–11.3)

## 2025-07-09 PROCEDURE — 99239 HOSP IP/OBS DSCHRG MGMT >30: CPT | Performed by: NURSE PRACTITIONER

## 2025-07-09 PROCEDURE — 2500000005 HC RX 250 GENERAL PHARMACY W/O HCPCS: Performed by: NURSE PRACTITIONER

## 2025-07-09 PROCEDURE — 83735 ASSAY OF MAGNESIUM: CPT | Performed by: NURSE PRACTITIONER

## 2025-07-09 PROCEDURE — 80048 BASIC METABOLIC PNL TOTAL CA: CPT | Performed by: NURSE PRACTITIONER

## 2025-07-09 PROCEDURE — 36415 COLL VENOUS BLD VENIPUNCTURE: CPT | Performed by: NURSE PRACTITIONER

## 2025-07-09 PROCEDURE — 2500000004 HC RX 250 GENERAL PHARMACY W/ HCPCS (ALT 636 FOR OP/ED): Performed by: NURSE PRACTITIONER

## 2025-07-09 PROCEDURE — 2500000004 HC RX 250 GENERAL PHARMACY W/ HCPCS (ALT 636 FOR OP/ED): Performed by: STUDENT IN AN ORGANIZED HEALTH CARE EDUCATION/TRAINING PROGRAM

## 2025-07-09 PROCEDURE — 93321 DOPPLER ECHO F-UP/LMTD STD: CPT | Performed by: INTERNAL MEDICINE

## 2025-07-09 PROCEDURE — 71045 X-RAY EXAM CHEST 1 VIEW: CPT

## 2025-07-09 PROCEDURE — 99233 SBSQ HOSP IP/OBS HIGH 50: CPT

## 2025-07-09 PROCEDURE — 71045 X-RAY EXAM CHEST 1 VIEW: CPT | Performed by: RADIOLOGY

## 2025-07-09 PROCEDURE — 93325 DOPPLER ECHO COLOR FLOW MAPG: CPT | Performed by: INTERNAL MEDICINE

## 2025-07-09 PROCEDURE — C8924 2D TTE W OR W/O FOL W/CON,FU: HCPCS

## 2025-07-09 PROCEDURE — 82947 ASSAY GLUCOSE BLOOD QUANT: CPT

## 2025-07-09 PROCEDURE — 85025 COMPLETE CBC W/AUTO DIFF WBC: CPT | Performed by: NURSE PRACTITIONER

## 2025-07-09 PROCEDURE — 93308 TTE F-UP OR LMTD: CPT | Performed by: INTERNAL MEDICINE

## 2025-07-09 PROCEDURE — 93926 LOWER EXTREMITY STUDY: CPT | Performed by: RADIOLOGY

## 2025-07-09 PROCEDURE — 93005 ELECTROCARDIOGRAM TRACING: CPT

## 2025-07-09 PROCEDURE — RXMED WILLOW AMBULATORY MEDICATION CHARGE

## 2025-07-09 PROCEDURE — 2500000001 HC RX 250 WO HCPCS SELF ADMINISTERED DRUGS (ALT 637 FOR MEDICARE OP): Performed by: NURSE PRACTITIONER

## 2025-07-09 PROCEDURE — 93926 LOWER EXTREMITY STUDY: CPT

## 2025-07-09 RX ORDER — METOPROLOL TARTRATE 25 MG/1
25 TABLET, FILM COATED ORAL 2 TIMES DAILY
Qty: 60 TABLET | Refills: 0 | Status: SHIPPED | OUTPATIENT
Start: 2025-07-09

## 2025-07-09 RX ORDER — MAGNESIUM SULFATE HEPTAHYDRATE 40 MG/ML
2 INJECTION, SOLUTION INTRAVENOUS ONCE
Status: COMPLETED | OUTPATIENT
Start: 2025-07-09 | End: 2025-07-09

## 2025-07-09 RX ORDER — METOPROLOL TARTRATE 25 MG/1
25 TABLET, FILM COATED ORAL 2 TIMES DAILY
Status: DISCONTINUED | OUTPATIENT
Start: 2025-07-09 | End: 2025-07-09 | Stop reason: HOSPADM

## 2025-07-09 RX ORDER — ACETAMINOPHEN 325 MG/1
650 TABLET ORAL EVERY 4 HOURS PRN
Qty: 30 TABLET | Refills: 0 | Status: SHIPPED | OUTPATIENT
Start: 2025-07-09 | End: 2025-07-16

## 2025-07-09 RX ADMIN — ACETAMINOPHEN 650 MG: 325 TABLET ORAL at 05:57

## 2025-07-09 RX ADMIN — MAGNESIUM SULFATE HEPTAHYDRATE 2 G: 40 INJECTION, SOLUTION INTRAVENOUS at 06:09

## 2025-07-09 RX ADMIN — DOCUSATE SODIUM 100 MG: 100 CAPSULE, LIQUID FILLED ORAL at 11:09

## 2025-07-09 RX ADMIN — Medication 125 MCG: at 11:09

## 2025-07-09 RX ADMIN — LOSARTAN POTASSIUM 50 MG: 50 TABLET, FILM COATED ORAL at 11:09

## 2025-07-09 RX ADMIN — ASPIRIN 81 MG: 81 TABLET, CHEWABLE ORAL at 11:09

## 2025-07-09 RX ADMIN — PERFLUTREN 2 ML OF DILUTION: 6.52 INJECTION, SUSPENSION INTRAVENOUS at 07:50

## 2025-07-09 RX ADMIN — VITAM B12 100 MCG: 100 TAB at 11:09

## 2025-07-09 RX ADMIN — MUPIROCIN 1 APPLICATION: 20 OINTMENT TOPICAL at 11:09

## 2025-07-09 RX ADMIN — PANTOPRAZOLE SODIUM 40 MG: 40 TABLET, DELAYED RELEASE ORAL at 06:09

## 2025-07-09 SDOH — ECONOMIC STABILITY: HOUSING INSECURITY: IN THE LAST 12 MONTHS, WAS THERE A TIME WHEN YOU WERE NOT ABLE TO PAY THE MORTGAGE OR RENT ON TIME?: NO

## 2025-07-09 SDOH — SOCIAL STABILITY: SOCIAL INSECURITY: WITHIN THE LAST YEAR, HAVE YOU BEEN AFRAID OF YOUR PARTNER OR EX-PARTNER?: NO

## 2025-07-09 SDOH — SOCIAL STABILITY: SOCIAL INSECURITY
WITHIN THE LAST YEAR, HAVE YOU BEEN RAPED OR FORCED TO HAVE ANY KIND OF SEXUAL ACTIVITY BY YOUR PARTNER OR EX-PARTNER?: NO

## 2025-07-09 SDOH — ECONOMIC STABILITY: FOOD INSECURITY: WITHIN THE PAST 12 MONTHS, YOU WORRIED THAT YOUR FOOD WOULD RUN OUT BEFORE YOU GOT THE MONEY TO BUY MORE.: NEVER TRUE

## 2025-07-09 SDOH — ECONOMIC STABILITY: HOUSING INSECURITY: AT ANY TIME IN THE PAST 12 MONTHS, WERE YOU HOMELESS OR LIVING IN A SHELTER (INCLUDING NOW)?: NO

## 2025-07-09 SDOH — ECONOMIC STABILITY: INCOME INSECURITY: IN THE PAST 12 MONTHS HAS THE ELECTRIC, GAS, OIL, OR WATER COMPANY THREATENED TO SHUT OFF SERVICES IN YOUR HOME?: NO

## 2025-07-09 SDOH — ECONOMIC STABILITY: TRANSPORTATION INSECURITY: IN THE PAST 12 MONTHS, HAS LACK OF TRANSPORTATION KEPT YOU FROM MEDICAL APPOINTMENTS OR FROM GETTING MEDICATIONS?: NO

## 2025-07-09 SDOH — ECONOMIC STABILITY: HOUSING INSECURITY: IN THE PAST 12 MONTHS, HOW MANY TIMES HAVE YOU MOVED WHERE YOU WERE LIVING?: 0

## 2025-07-09 SDOH — SOCIAL STABILITY: SOCIAL INSECURITY
WITHIN THE LAST YEAR, HAVE YOU BEEN KICKED, HIT, SLAPPED, OR OTHERWISE PHYSICALLY HURT BY YOUR PARTNER OR EX-PARTNER?: NO

## 2025-07-09 SDOH — ECONOMIC STABILITY: FOOD INSECURITY: WITHIN THE PAST 12 MONTHS, THE FOOD YOU BOUGHT JUST DIDN'T LAST AND YOU DIDN'T HAVE MONEY TO GET MORE.: NEVER TRUE

## 2025-07-09 SDOH — SOCIAL STABILITY: SOCIAL INSECURITY: WITHIN THE LAST YEAR, HAVE YOU BEEN HUMILIATED OR EMOTIONALLY ABUSED IN OTHER WAYS BY YOUR PARTNER OR EX-PARTNER?: NO

## 2025-07-09 SDOH — ECONOMIC STABILITY: FOOD INSECURITY: HOW HARD IS IT FOR YOU TO PAY FOR THE VERY BASICS LIKE FOOD, HOUSING, MEDICAL CARE, AND HEATING?: NOT VERY HARD

## 2025-07-09 ASSESSMENT — ACTIVITIES OF DAILY LIVING (ADL): LACK_OF_TRANSPORTATION: NO

## 2025-07-09 ASSESSMENT — PAIN - FUNCTIONAL ASSESSMENT
PAIN_FUNCTIONAL_ASSESSMENT: 0-10
PAIN_FUNCTIONAL_ASSESSMENT: 0-10

## 2025-07-09 ASSESSMENT — PAIN SCALES - GENERAL
PAINLEVEL_OUTOF10: 0 - NO PAIN
PAINLEVEL_OUTOF10: 0 - NO PAIN

## 2025-07-09 NOTE — CARE PLAN
The patient's goals for the shift include      The clinical goals for the shift include pt will be free from injury throughour shift

## 2025-07-09 NOTE — SIGNIFICANT EVENT
TVP removed without complication. Patient tolerated well. Advised to lay flat for 30min then can ambulate as directed.

## 2025-07-09 NOTE — PROGRESS NOTES
[facility]       Date:  7/9/2025  Patient:  Alma Lane  YOB: 1950  MRN:  [id number]   Admit Date:  7/8/2025  ========================================================================================================    No chief complaint on file.        History of Present Illness:  Alma Lane is a 75 y.o. year old female patient with Past Medical History of hypertension, hyperlipidemia, aortic stenosis, diabetes, current smoker, COPD presented for scheduled TAVR.  Patient was evaluated in the office and after risk versus benefits were discussed it was decided to proceed with a TAVR. Procedure was without complication.  Bilateral femoral sites were accessed.  Right internal jugular TVP in place. Patient developed new LBBB in procedure. Patient will be admitted to the ICU post procedure.    Interval ICU Events:  7/8: admit to the ICU post TAVR. RIJ TVP in place.     7/9: No acute events overnight. Complains of right groin/leg pain with movement. Right calf is soft and normothermic. Tentative plan for discharge today.    Medical History:  Medical History[1]  Surgical History[2]  Prescriptions Prior to Admission[3]  Atorvastatin, Ondansetron, and Simvastatin  Social History[4]  Family History[5]    Review of Systems:  14 point review of systems was completed and negative except for those specially mention in my HPI    Physical Exam:    Heart Rate:  [56-82]   Temp:  [36 °C (96.8 °F)-36.5 °C (97.7 °F)]   Resp:  [10-32]   BP: ()/(44-79)   Weight:  [71.6 kg (157 lb 13.6 oz)-72.2 kg (159 lb 2.8 oz)]   SpO2:  [96 %-100 %]     Physical Exam  Constitutional:       General: She is not in acute distress.     Appearance: She is not ill-appearing.   HENT:      Head: Normocephalic.      Mouth/Throat:      Mouth: Mucous membranes are dry.   Eyes:      Pupils: Pupils are equal, round, and reactive to light.   Cardiovascular:      Rate and Rhythm: Normal rate and regular rhythm.      Pulses:  Normal pulses.      Heart sounds: Normal heart sounds.   Pulmonary:      Effort: Pulmonary effort is normal.      Breath sounds: Normal breath sounds.   Abdominal:      General: Abdomen is flat. Bowel sounds are normal.      Palpations: Abdomen is soft.   Musculoskeletal:         General: Normal range of motion.   Skin:     General: Skin is warm and dry.      Capillary Refill: Capillary refill takes less than 2 seconds.      Comments: Bilateral femoral access sites clean, dry, and intact   Neurological:      Mental Status: She is alert and oriented to person, place, and time.         Objective:    I have reviewed all medications, laboratory results, and imaging pertinent for today's encounter    Assessment/Plan:    I am currently managing this critically ill patient for the following problems:    Neuro/Psych/Pain Ctrl/Sedation:  #Anxiety/Depression  #Acute Post Op Pain  -CAM ICU precautions  -Neuro checks    Respiratory/ENT:  #COPD  -On no nebs or inhalers  -Supplemental oxygen as needed to keep SPO2 >90%    Cardiovascular:  #Aortic Stenosis s/p TAVR   #Hx of HTN,HLD  #Current Smoker  -Continue home losartan, metoprolol, aspirin, crestor  -Echo today  -Cardiac surgery following  -Monitor for arrhythmias     GI:  #GERD  -Continue PPI  -Clear liquid diet    Renal/Volume Status (Intra & Extravascular):  #CKD Stage III  -Daily BMP  -Given 500mL IVF in cath lab  -Monitor and replace electrolytes  -Strict intake and output  -Avoid nephrotoxic agents    Endocrine  #Diabetes Mellitus  -SSI, Accuchecks ACHS  -Hypoglycemic protocol    Infectious Disease:  -No active issues    Heme/Onc:  -No active issues    OBGYN/MSK:  -No active issues    Ethics/Code Status:  Full code    :  DVT Prophylaxis: holding  GI Prophylaxis: home PPI  Bowel Regimen: colace BID  Diet: CLD  CVC: remove TVP  Frazee: no  Rodriguez: no  Restraints: no  Dispo: tentative discharge today    Critical Care Time:  45 minutes spent in preparing to see  patient (I.e. review of medical records), evaluation of diagnostics (I.e. labs, imaging, etc.), documentation, discussing plan of care with patient/ family/ caregiver, and/ or coordination of care with multidisciplinary team. Time does not include completion of procedure time.      Teresa Loo, APRN-CNP            [1]   Past Medical History:  Diagnosis Date    Breast cancer     Carotid artery occlusion     Chronic kidney disease     Chronic obstructive pulmonary disease with (acute) exacerbation (Multi) 04/15/2022    Acute exacerbation of chronic obstructive pulmonary disease (COPD)    Diabetes mellitus (Multi)     Hx antineoplastic chemo     Hyperlipidemia     Hypertension     Personal history of diseases of the blood and blood-forming organs and certain disorders involving the immune mechanism     History of anemia    Personal history of irradiation     Personal history of other diseases of the musculoskeletal system and connective tissue     History of arthritis    Personal history of other diseases of the respiratory system     History of chronic obstructive lung disease    Personal history of other diseases of the respiratory system     History of upper respiratory infection    Personal history of other diseases of the respiratory system     History of acute pharyngitis    Personal history of other diseases of the respiratory system 10/25/2016    History of acute pharyngitis    Personal history of other diseases of the respiratory system     History of pulmonary emphysema    Personal history of other endocrine, nutritional and metabolic disease     History of hyperlipidemia    Personal history of other endocrine, nutritional and metabolic disease     History of diabetes mellitus    Personal history of other specified conditions     History of chest pain    Shortness of breath     Shortness of breath at rest   [2]   Past Surgical History:  Procedure Laterality Date    APPENDECTOMY  10/25/2016     Appendectomy    BREAST LUMPECTOMY      BREAST SURGERY  10/25/2016    Breast Surgery    CARDIAC CATHETERIZATION Left 2025    Procedure: LHC, With LV;  Surgeon: Blaine Ponce MD;  Location: [facility];  Service: Cardiovascular;  Laterality: Left;  holding    COLONOSCOPY  10/25/2016    Complete Colonoscopy    CT ANGIO NECK  10/04/2021    CT NECK ANGIO W AND WO IV CONTRAST 10/4/2021 [facility]    MOUTH SURGERY  10/25/2016    Oral Surgery Tooth Extraction    OTHER SURGICAL HISTORY  2022    Ganglion cyst excision   [3]   Facility-Administered Medications Prior to Admission   Medication Dose Route Frequency Provider Last Rate Last Admin    sodium chloride 0.9% infusion  100 mL/hr intravenous Once Blaine Ponce MD         Medications Prior to Admission   Medication Sig Dispense Refill Last Dose/Taking    aspirin 81 mg chewable tablet Chew and swallow 1 tablet (81 mg) once daily.   2025 Morning    cholecalciferol (Vitamin D-3) 125 MCG (5000 UT) capsule Take 1 capsule (125 mcg) by mouth once daily.   2025    co-enzyme Q-10 50 mg capsule Take 2 capsules (100 mg) by mouth once daily.   2025    cyanocobalamin (Vitamin B-12) 100 mcg tablet Take 1 tablet (100 mcg) by mouth once daily.   2025    losartan (Cozaar) 50 mg tablet Take 1 tablet (50 mg) by mouth 2 times a day. 180 tablet 3 2025    metoprolol tartrate (Lopressor) 50 mg tablet Take 1 tablet by mouth 2 times a day. 180 tablet 1 2025 Morning    pantoprazole (ProtoNix) 20 mg EC tablet 1 po qd (Patient taking differently: Take 1 tablet (20 mg) by mouth if needed. 1 po qd) 100 tablet 4 2025 Morning    pioglitazone (Actos) 30 mg tablet 1 by mouth daily, prescribed by endocrinology.   2025    rosuvastatin (Crestor) 20 mg tablet TAKE ONE TABLET BY MOUTH EVERY DAY 90 tablet 3 2025 Morning    [] acetaminophen (Tylenol) 500 mg tablet Take 2 tablets (1,000 mg) by mouth every 6 hours if needed for  mild pain (1 - 3) for up to 10 days. 30 tablet 0     benzonatate (Tessalon) 100 mg capsule Take 1 capsule (100 mg) by mouth 3 times a day as needed for cough. Do not crush or chew. 42 capsule 1     Blood glucose monitoring meter Used to check sugar up to 6 times a day as needed. 1 each 0     blood sugar diagnostic (Blood Glucose Test) strip Used to test sugars up to 6 times daily as needed. 600 strip 3     lancets misc Use to test sugar up to 6 times daily as needed and directed 600 each 3     metoclopramide (Reglan) 10 mg tablet Take 1 tablet (10 mg) by mouth every 6 hours for 4 days. 16 tablet 0     naloxone (Narcan) 4 mg/0.1 mL nasal spray Administer 1 spray (4 mg) into affected nostril(s) if needed for opioid reversal or respiratory depression for up to 1 dose. May repeat every 2-3 minutes if needed, alternating nostrils, until medical assistance becomes available. 1 each 0    [4]   Social History  Tobacco Use    Smoking status: Every Day     Current packs/day: 0.50     Average packs/day: 0.5 packs/day for 56.5 years (28.3 ttl pk-yrs)     Types: Cigarettes     Start date: 1969     Passive exposure: Past    Smokeless tobacco: Never    Tobacco comments:     Last smoked July 8, 2025   Vaping Use    Vaping status: Never Used   Substance Use Topics    Alcohol use: Not Currently    Drug use: Not Currently   [5]   Family History  Problem Relation Name Age of Onset    No Known Problems Mother      Cancer Father      Diabetes Father

## 2025-07-09 NOTE — NURSING NOTE
Pt discharged to home, reviewed home going instructions with pt and children at bedside.  No questions at this time, IV removed, pt belongings given to pt and son.  Pt to  scripts at [facility].

## 2025-07-09 NOTE — CARE PLAN
Problem: Pain - Adult  Goal: Verbalizes/displays adequate comfort level or baseline comfort level  Outcome: Progressing     Problem: Safety - Adult  Goal: Free from fall injury  Outcome: Progressing     Problem: Discharge Planning  Goal: Discharge to home or other facility with appropriate resources  Outcome: Progressing     Problem: Chronic Conditions and Co-morbidities  Goal: Patient's chronic conditions and co-morbidity symptoms are monitored and maintained or improved  Outcome: Progressing     Problem: Nutrition  Goal: Nutrient intake appropriate for maintaining nutritional needs  Outcome: Progressing     Problem: Skin  Goal: Decreased wound size/increased tissue granulation at next dressing change  Outcome: Progressing  Flowsheets (Taken 7/8/2025 2241)  Decreased wound size/increased tissue granulation at next dressing change: Promote sleep for wound healing  Goal: Participates in plan/prevention/treatment measures  Outcome: Progressing  Flowsheets (Taken 7/8/2025 2241)  Participates in plan/prevention/treatment measures: Elevate heels  Goal: Prevent/manage excess moisture  Outcome: Progressing  Flowsheets (Taken 7/8/2025 2241)  Prevent/manage excess moisture:   Moisturize dry skin   Cleanse incontinence/protect with barrier cream  Goal: Prevent/minimize sheer/friction injuries  Outcome: Progressing  Flowsheets (Taken 7/8/2025 2241)  Prevent/minimize sheer/friction injuries: Use pull sheet  Goal: Promote/optimize nutrition  Outcome: Progressing  Flowsheets (Taken 7/8/2025 2241)  Promote/optimize nutrition: Monitor/record intake including meals  Goal: Promote skin healing  Outcome: Progressing  Flowsheets (Taken 7/8/2025 2241)  Promote skin healing: Turn/reposition every 2 hours/use positioning/transfer devices   The patient's goals for the shift include      The clinical goals for the shift include pt will be free from injury throughour shift

## 2025-07-10 ENCOUNTER — PATIENT OUTREACH (OUTPATIENT)
Dept: CARDIOLOGY | Facility: CLINIC | Age: 75
End: 2025-07-10
Payer: MEDICARE

## 2025-07-10 NOTE — PROGRESS NOTES
Discharge Facility: [facility]  Discharge Diagnosis: Aortic stenosis   Admission Date: 7/8/25  Discharge Date:  7/9/25    PCP Appointment Date: N/A  Specialist Appointment Date:   - Dr. Parson (Cardiology): 7/28/25 - msg to office for sooner appt  - Dr. Ponce in 4 weeks with ECHO  Hospital Encounter and Summary Linked: Yes  Admission (Discharged) with Taran LUCIANO DO Zeke (07/08/2025)   See discharge assessment below for further details    Wrap Up  Wrap Up Additional Comments: Pt states she feels perfecto rough but not worse since going home. States her breathing is at her normal and she's doing okay. Reports she can't see her incisions where they are at.  Reviewed medication changes. Denies needs at home. Denies questions/concerns at this time. (7/10/2025 10:09 AM)    Medications  Medications reviewed with patient/caregiver?: Yes (new/changes only) (7/10/2025 10:09 AM)  Is the patient having any side effects they believe may be caused by any medication additions or changes?: No (7/10/2025 10:09 AM)  Does the patient have all medications ordered at discharge?: Yes (7/10/2025 10:09 AM)  Care Management Interventions: No intervention needed (7/10/2025 10:09 AM)  Prescription Comments: CHANGE: acetaminophen 325mg 2 tab q4h PRN, decrease metoprolol tartrate 25mg BID, pantoprazole daily  STOP: metoclopramide, narcan (7/10/2025 10:09 AM)  Is the patient taking all medications as directed (includes completed medication regime)?: Yes (7/10/2025 10:09 AM)  Care Management Interventions: Provided patient education (7/10/2025 10:09 AM)    Appointments  Does the patient have a primary care provider?: Yes (7/10/2025 10:09 AM)  Care Management Interventions: Advised patient to make appointment (7/10/2025 10:09 AM)  Has the patient kept scheduled appointments due by today?: Yes (7/10/2025 10:09 AM)    Self Management  Has home health visited the patient within 72 hours of discharge?: Not applicable (7/10/2025 10:09 AM)  What Durable  Medical Equipment (DME) was ordered?: n/a (7/10/2025 10:09 AM)    Patient Teaching  Does the patient have access to their discharge instructions?: Yes (7/10/2025 10:09 AM)  Care Management Interventions: Reviewed instructions with patient (7/10/2025 10:09 AM)  What is the patient's perception of their health status since discharge?: Improving (7/10/2025 10:09 AM)  Is the patient/caregiver able to teach back the hierarchy of who to call/visit for symptoms/problems? PCP, Specialist, Home Health nurse, Urgent Care, ED, 911: Yes (7/10/2025 10:09 AM)

## 2025-07-13 NOTE — OP NOTE
TAVR (Transcatheter AV Replacement), Temporary Pacemaker Insertion Operative Note     Date: 2025  OR Location: [facility]    Name: Alma Lane, : 1950, Age: 75 y.o., MRN: [id number], Sex: female    Diagnosis  Pre-op Diagnosis      * Aortic stenosis [I35.0] Post-op Diagnosis     * Aortic stenosis [I35.0]     Procedures  TAVR (Transcatheter AV Replacement)  34167 - NM REPLACE AORTIC VALVE PERQ FEMORAL ARTRY APPROACH    Temporary Pacemaker Insertion    Surgeons        * Ortega Vivas - Primary    Cardiology:       * Blaine Ponce - Primary    Resident/Fellow/Other Assistant:  Miguel Lynch MD  Fellow    Staff:   Scrub Person:   Sherleyulator: Tip  Circulator: Mer  Circulator: Alyson  Scrub Person: Merline    Anesthesia Staff: Perfusionist: Maryam Garcia    Procedure Summary  Anesthesia: Anesthesia type not filed in the log.  ASA: ASA status not filed in the log.  Estimated Blood Loss: 20mL  Intra-op Medications:   Administrations occurring from 733 to 936 on 25:   Medication Name Total Dose   ceFAZolin (Ancef) in dextrose (iso) IV 2 g   lidocaine (Xylocaine) 20 mg/mL (2 %) injection 45 mL   midazolam (Versed) injection 2 mg   fentaNYL PF (Sublimaze) injection 125 mcg   heparin 1,000 unit/mL injection 9,000 Units   protamine injection 50 mg   iohexol (OMNIPaque) 350 mg iodine/mL solution 20 mL              Perfusion Record               Intraprocedure I/O Totals       None           Specimen: No specimens collected              Drains and/or Catheters: * None in log *    Tourniquet Times:         Implants:  Implants       Type Name Action Serial No.      Heart Valve Repair LOADING SYSTEM, EVOLUT FX,  23-29MM - [id number] Implanted       26MM EVOLUT FX+ TRANSCATHETER AORTIC VALVE SYSTEM Implanted [id number]             Indications: Alma Lane is an 75 y.o. female who is having Percutaneous aortic valve implantation    The patient was seen in the preoperative area.  The risks, benefits, complications, treatment options, non-operative alternatives, expected recovery and outcomes were discussed with the patient. The possibilities of reaction to medication, pulmonary aspiration, injury to surrounding structures, bleeding, recurrent infection, the need for additional procedures, failure to diagnose a condition, and creating a complication requiring transfusion or operation were discussed with the patient. The patient concurred with the proposed plan, giving informed consent.  The site of surgery was properly noted/marked if necessary per policy. The patient has been actively warmed in preoperative area. Preoperative antibiotics have been ordered and given within 1 hours of incision. Venous thrombosis prophylaxis are not indicated.    Procedure Details:   Transcatheter Aortic Valve Replacement (TAVR):  The right femoral artery was accessed using the transfemoral method (ultrasound guided). A 8 Fr sheath was inserted followed by the deployment of 2 Proglides and then a 14F Sentrant sheath was placed. The left femoral artery was accessed percutaneously and a 6 Turkish contralateral sheath was placed. A 6 Turkish temporary pacemaker was inserted through the right jugular vein and advanced to the right ventricular apex. Adequate pacing thresholds were obtained. After crossing the stenotic aortic valve, balloon aortic valvuloplasty was performed with an 18 x 45 mm True Dilatation. Evolut FX + 26 mm valve was successfully deployed under rapid ventricular pacing at 160 BPM. Transthoracic echo performed post valve deployment revealed no central aortic insufficiency and/or no pericardial effusion and a transvalvular mean gradient of 4 mmHg. No device related events. No bleeding events occurred during the procedure. No vascular access complications were revealed. Access site was closed using 2   Proglide devices. Hemostasis was achieved in the left femoral artery using a proglide device.  Temporary pacing wire was sutured in place.      Evidence of Infection: No   Complications:  None; patient tolerated the procedure well.    Disposition: ICU - extubated and stable.  Condition: stable     Attending Attestation: I was present and scrubbed for the entire procedure.    Ortega Lerma MD  [phone]

## 2025-07-14 LAB
ATRIAL RATE: 64 BPM
ATRIAL RATE: 87 BPM
P AXIS: 51 DEGREES
P AXIS: 56 DEGREES
P OFFSET: 175 MS
P OFFSET: 206 MS
P ONSET: 126 MS
P ONSET: 161 MS
PR INTERVAL: 132 MS
PR INTERVAL: 174 MS
Q ONSET: 213 MS
Q ONSET: 227 MS
QRS COUNT: 10 BEATS
QRS COUNT: 14 BEATS
QRS DURATION: 104 MS
QRS DURATION: 84 MS
QT INTERVAL: 370 MS
QT INTERVAL: 428 MS
QTC CALCULATION(BAZETT): 441 MS
QTC CALCULATION(BAZETT): 445 MS
QTC FREDERICIA: 418 MS
QTC FREDERICIA: 437 MS
R AXIS: 34 DEGREES
R AXIS: 43 DEGREES
T AXIS: 163 DEGREES
T AXIS: 73 DEGREES
T OFFSET: 412 MS
T OFFSET: 427 MS
VENTRICULAR RATE: 64 BPM
VENTRICULAR RATE: 87 BPM

## 2025-07-17 DIAGNOSIS — E78.2 MIXED HYPERLIPIDEMIA: ICD-10-CM

## 2025-07-17 RX ORDER — ROSUVASTATIN CALCIUM 20 MG/1
20 TABLET, COATED ORAL DAILY
Qty: 90 TABLET | Refills: 3 | Status: SHIPPED | OUTPATIENT
Start: 2025-07-17 | End: 2026-07-17

## 2025-07-17 NOTE — TELEPHONE ENCOUNTER
Received request for prescription refills for patient.   Patient follows with Dr. Parson    Request is for rosuvastatin  Is patient currently on medication yes    Last OV 5/8/2025  Next OV 7/28/2025    Pended for signing and sent to provider

## 2025-07-25 ENCOUNTER — PATIENT OUTREACH (OUTPATIENT)
Dept: CARDIOLOGY | Facility: CLINIC | Age: 75
End: 2025-07-25
Payer: MEDICARE

## 2025-07-25 ENCOUNTER — APPOINTMENT (OUTPATIENT)
Dept: RADIOLOGY | Facility: HOSPITAL | Age: 75
End: 2025-07-25
Payer: MEDICARE

## 2025-07-25 NOTE — PROGRESS NOTES
Confirmation of at least 2 patient identifiers.    Completed telephonic follow-up with patient approximately 14 days post discharge, no cardiology follow up.   Spoke to patient during outreach call.    Patient reports feeling: Improved  Patient reports she feels a little bit better but thinks she should be doing better than she is. States she sees Dr. Parson on Monday. States she still has swelling in her legs when she is up during the day but it goes down when she is in bed. Denies difficulty breathing or shortness of breath. States she tries to keep her legs up as much as she can in the daytime but can't all the time. Pt is aware to return to ED for new/worsening symptoms, difficulty breathing, SOB.    Patient has questions or concerns about medications: No    Have all prescribed medications been filled? Yes    Patient has necessary resources to manage their care? Yes    Patient has questions or concerns? No    Next care management follow-up approximately within one month.  Care  information provided to patient.

## 2025-07-28 ENCOUNTER — APPOINTMENT (OUTPATIENT)
Dept: CARDIOLOGY | Facility: CLINIC | Age: 75
End: 2025-07-28
Payer: MEDICARE

## 2025-07-28 VITALS
HEART RATE: 74 BPM | BODY MASS INDEX: 28.98 KG/M2 | HEIGHT: 62 IN | DIASTOLIC BLOOD PRESSURE: 70 MMHG | WEIGHT: 157.5 LBS | SYSTOLIC BLOOD PRESSURE: 140 MMHG

## 2025-07-28 DIAGNOSIS — Z87.891 FORMER SMOKER: ICD-10-CM

## 2025-07-28 DIAGNOSIS — I65.29 STENOSIS OF CAROTID ARTERY, UNSPECIFIED LATERALITY: ICD-10-CM

## 2025-07-28 DIAGNOSIS — N18.30 STAGE 3 CHRONIC KIDNEY DISEASE, UNSPECIFIED WHETHER STAGE 3A OR 3B CKD (MULTI): ICD-10-CM

## 2025-07-28 DIAGNOSIS — I10 BENIGN ESSENTIAL HYPERTENSION: ICD-10-CM

## 2025-07-28 DIAGNOSIS — R60.0 LOCALIZED EDEMA: ICD-10-CM

## 2025-07-28 DIAGNOSIS — J44.9 CHRONIC OBSTRUCTIVE PULMONARY DISEASE, UNSPECIFIED COPD TYPE (MULTI): ICD-10-CM

## 2025-07-28 DIAGNOSIS — E11.9 TYPE 2 DIABETES MELLITUS WITHOUT COMPLICATION, WITHOUT LONG-TERM CURRENT USE OF INSULIN: ICD-10-CM

## 2025-07-28 DIAGNOSIS — I35.0 SEVERE AORTIC STENOSIS: ICD-10-CM

## 2025-07-28 DIAGNOSIS — I35.0 AORTIC VALVE STENOSIS, ETIOLOGY OF CARDIAC VALVE DISEASE UNSPECIFIED: ICD-10-CM

## 2025-07-28 DIAGNOSIS — E11.9 TYPE 2 DIABETES MELLITUS WITHOUT COMPLICATION, WITHOUT LONG-TERM CURRENT USE OF INSULIN: Primary | ICD-10-CM

## 2025-07-28 DIAGNOSIS — I35.0 AORTIC STENOSIS, MODERATE: ICD-10-CM

## 2025-07-28 DIAGNOSIS — R06.09 DYSPNEA ON EXERTION: ICD-10-CM

## 2025-07-28 PROCEDURE — 3078F DIAST BP <80 MM HG: CPT | Performed by: INTERNAL MEDICINE

## 2025-07-28 PROCEDURE — 1111F DSCHRG MED/CURRENT MED MERGE: CPT | Performed by: INTERNAL MEDICINE

## 2025-07-28 PROCEDURE — 3077F SYST BP >= 140 MM HG: CPT | Performed by: INTERNAL MEDICINE

## 2025-07-28 PROCEDURE — 99214 OFFICE O/P EST MOD 30 MIN: CPT | Performed by: INTERNAL MEDICINE

## 2025-07-28 PROCEDURE — 1159F MED LIST DOCD IN RCRD: CPT | Performed by: INTERNAL MEDICINE

## 2025-07-28 RX ORDER — HYDROCHLOROTHIAZIDE 12.5 MG/1
12.5 CAPSULE ORAL EVERY MORNING
Qty: 90 CAPSULE | Refills: 3 | Status: SHIPPED | OUTPATIENT
Start: 2025-07-28 | End: 2026-07-28

## 2025-07-28 NOTE — PROGRESS NOTES
Patient:  Alma Lane  YOB: 1950  MRN: [id number]     Chief complaint:   Chief Complaint   Patient presents with    Hospital Follow-up     Pt. Present today for 2 WEEKS TAVR due to Aortic stenosis        Chief Complaint/Active Symptoms:       Alma Lane is a 75 y.o. female who returns today for cardiac follow-up.  Patient had a TAVR few weeks back.  She has normal coronaries and normal LV function.  She does have some mild leg puffiness and swelling at times.  She still still feels a bit fatigued.  We are going to have her begin 12.5 mg daily of hydrochlorothiazide for both blood pressure management and legs swelling at times.  She will return in about 6 weeks to recheck.  Otherwise she is stable ambulatory without any other difficulties.      Objective:     Vitals:    07/28/25 1323   BP: 140/70   Pulse: 74       Vitals:    07/28/25 1323   Weight: 71.4 kg (157 lb 8 oz)       Allergies:     Allergies   Allergen Reactions    Atorvastatin Hives     Tolerates rosuvastatin    Ondansetron Hives    Simvastatin Hives     Tolerates rosuvastatin          Medications:     Current Outpatient Medications   Medication Instructions    aspirin 81 mg chewable tablet 1 tablet, Daily    benzonatate (TESSALON) 100 mg, oral, 3 times daily PRN, Do not crush or chew.    Blood glucose monitoring meter Used to check sugar up to 6 times a day as needed.    blood sugar diagnostic (Blood Glucose Test) strip Used to test sugars up to 6 times daily as needed.    cholecalciferol (VITAMIN D-3) 125 mcg, Daily    co-enzyme Q-10 100 mg, Daily    cyanocobalamin (VITAMIN B-12) 100 mcg, Daily    lancets misc Use to test sugar up to 6 times daily as needed and directed    losartan (COZAAR) 50 mg, oral, 2 times daily    metoprolol tartrate (LOPRESSOR) 25 mg, oral, 2 times daily    pantoprazole (ProtoNix) 20 mg EC tablet 1 po qd    pioglitazone (Actos) 30 mg tablet 1 by mouth daily, prescribed by endocrinology.    rosuvastatin (CRESTOR)  20 mg, oral, Daily       Physical Examination:   Constitutional:       Appearance: Healthy appearance. Not in distress.   Neck:      Vascular: No JVR. JVD normal.   Pulmonary:      Effort: Pulmonary effort is normal.      Breath sounds: Normal breath sounds. No wheezing. No rhonchi. No rales.   Chest:      Chest wall: Not tender to palpatation.   Cardiovascular:      PMI at left midclavicular line. Normal rate. Regular rhythm. Normal S1. Normal S2.       Murmurs: There is no murmur.      No gallop.  No click. No rub.   Pulses:     Intact distal pulses.   Edema:     Peripheral edema absent.   Abdominal:      General: Bowel sounds are normal.      Palpations: Abdomen is soft.      Tenderness: There is no abdominal tenderness.   Musculoskeletal: Normal range of motion.         General: No tenderness. Skin:     General: Skin is warm and dry.   Neurological:      General: No focal deficit present.      Mental Status: Alert and oriented to person, place and time.          Lab:     CBC:   Lab Results   Component Value Date    WBC 8.4 07/09/2025    WBC 12.6 (H) 05/08/2025    RBC 3.03 (L) 07/09/2025    RBC 3.83 05/08/2025    HGB 9.1 (L) 07/09/2025    HGB 11.8 05/08/2025    HCT 28.6 (L) 07/09/2025    HCT 36.5 05/08/2025     07/09/2025     05/08/2025        CMP:    Lab Results   Component Value Date     (L) 07/09/2025     05/08/2025    K 4.3 07/09/2025    K 4.6 05/08/2025     07/09/2025     05/08/2025    CO2 22 07/09/2025    CO2 23 05/08/2025    BUN 19 07/09/2025    BUN 45 (H) 05/08/2025    CREATININE 1.30 (H) 07/09/2025    CREATININE 1.43 (H) 05/08/2025    GLUCOSE 109 (H) 07/09/2025    GLUCOSE 88 05/08/2025    CALCIUM 8.4 (L) 07/09/2025    CALCIUM 9.1 05/08/2025       Magnesium:    Lab Results   Component Value Date    MG 1.86 07/09/2025       Lipid Profile:    Lab Results   Component Value Date    TRIG 308 (H) 04/10/2024    HDL 27.6 04/10/2024    LDLCALC 18 04/10/2024    LDLDIRECT 57  12/09/2024       TSH:    Lab Results   Component Value Date    TSH 2.65 12/09/2024       BNP:   Lab Results   Component Value Date     (H) 09/06/2021        PT/INR:    Lab Results   Component Value Date    PROTIME 12.6 (H) 07/08/2025    PROTIME 10.9 05/08/2025    INR 1.1 07/08/2025    INR 1.0 05/08/2025       HgBA1c:    Lab Results   Component Value Date    HGBA1C 6.7 (H) 07/07/2025       BMP:  Lab Results   Component Value Date     (L) 07/09/2025     07/08/2025     07/08/2025     05/08/2025    K 4.3 07/09/2025    K 4.6 07/08/2025    K 4.2 07/08/2025    K 4.6 05/08/2025     07/09/2025     (H) 07/08/2025     07/08/2025     05/08/2025    CO2 22 07/09/2025    CO2 23 07/08/2025    CO2 25 07/08/2025    CO2 23 05/08/2025    BUN 19 07/09/2025    BUN 24 (H) 07/08/2025    BUN 28 (H) 07/08/2025    BUN 45 (H) 05/08/2025    CREATININE 1.30 (H) 07/09/2025    CREATININE 1.42 (H) 07/08/2025    CREATININE 1.51 (H) 07/08/2025    CREATININE 1.43 (H) 05/08/2025       CBC:  Lab Results   Component Value Date    WBC 8.4 07/09/2025    WBC 7.9 07/08/2025    WBC 8.5 07/08/2025    WBC 12.6 (H) 05/08/2025    RBC 3.03 (L) 07/09/2025    RBC 3.14 (L) 07/08/2025    RBC 3.39 (L) 07/08/2025    RBC 3.83 05/08/2025    HGB 9.1 (L) 07/09/2025    HGB 9.5 (L) 07/08/2025    HGB 10.3 (L) 07/08/2025    HGB 11.8 05/08/2025    HCT 28.6 (L) 07/09/2025    HCT 30.2 (L) 07/08/2025    HCT 32.7 (L) 07/08/2025    HCT 36.5 05/08/2025    MCV 94 07/09/2025    MCV 96 07/08/2025    MCV 97 07/08/2025    MCV 95.3 05/08/2025    MCH 30.0 07/09/2025    MCH 30.3 07/08/2025    MCH 30.4 07/08/2025    MCH 30.8 05/08/2025    MCHC 31.8 (L) 07/09/2025    MCHC 31.5 (L) 07/08/2025    MCHC 31.5 (L) 07/08/2025    MCHC 32.3 05/08/2025    RDW 15.7 (H) 07/09/2025    RDW 15.8 (H) 07/08/2025    RDW 15.7 (H) 07/08/2025    RDW 13.0 05/08/2025     07/09/2025     07/08/2025     07/08/2025     05/08/2025    MPV  11.3 05/08/2025       Cardiac Enzymes:    Lab Results   Component Value Date    TROPHS 11 06/11/2025    TROPHS 11 06/11/2025       Hepatic Function Panel:    Lab Results   Component Value Date    ALKPHOS 54 07/08/2025    ALT 13 07/08/2025    AST 14 07/08/2025    PROT 7.0 07/08/2025    BILITOT 0.3 07/08/2025         Diagnostic Studies:     ECG 12 lead daily  Result Date: 7/14/2025  Normal sinus rhythm Septal infarct , age undetermined ST & T wave abnormality, consider lateral ischemia Abnormal ECG When compared with ECG of 08-JUL-2025 06:42, (unconfirmed) QRS duration has increased Septal infarct is now Present Nonspecific T wave abnormality now evident in Inferior leads T wave inversion now evident in Lateral leads Confirmed by Donte Jacobsen ([id number]) on 7/14/2025 10:42:43 AM    ECG 12 lead daily  Result Date: 7/14/2025  Normal sinus rhythm Normal ECG When compared with ECG of 08-JUL-2025 10:11, (unconfirmed) Questionable change in QRS duration Criteria for Septal infarct are no longer Present Confirmed by Donte Jacobsen ([id number]) on 7/14/2025 9:58:27 AM    Transthoracic Echo (TTE) Limited  Result Date: 7/9/2025          [place] [id number]  Tel [phone] Fax [phone] TRANSTHORACIC ECHOCARDIOGRAM REPORT Patient Name:       DARVIN Urbina Physician:    [id number] Martínez Brennan MD, [facility] Study Date:         7/9/2025             Ordering Provider:    [id number] EMMA ASHBY MRN/PID:            [id number]             Fellow: Accession#:         [id number]         Nurse: Date of Birth/Age:  1950 / 75 years  Sonographer:          Cheyanne Saeed                                                                [place] Gender Assigned at  F                    Additional Staff: Birth: Height:             157.48 cm            Admit Date:            7/8/2025 Weight:             72.12 kg             Admission Status:     Inpatient -                                                                Routine BSA / BMI:          1.73 m2 / 29.08      Department Location:  [facility]                     kg/m2 Blood Pressure: 173 /79 mmHg Study Type:    TRANSTHORACIC ECHO (TTE) LIMITED Diagnosis/ICD: Presence of prosthetic heart valve-Z95.2 Indication:    S/p TAVR CPT Codes:     Echo Limited-76939; Doppler Limited-20822; Color Doppler-12744 Patient History: Smoker:            Current. Valve Disorders:   Aortic Valve Replacement. Diabetes:          Yes Pertinent History: TAVR-Medtronic Evolut FX+ 26mm (7/8/25), HTN, Hyperlipidemia,                    COPD and Dyspnea. Study Detail: The following Echo studies were performed: 2D, Doppler and color               flow. Definity used as a contrast agent for endocardial border               definition. Total contrast used for this procedure was 2 mL via IV               push.  PHYSICIAN INTERPRETATION: Left Ventricle: The left ventricular systolic function is normal with a visually estimated ejection fraction of 60-65%. There are no regional wall motion abnormalities. The left ventricular cavity size was not assessed. There is normal posterior left ventricular wall thickness. There is left ventricular concentric remodeling. Left ventricular diastolic filling was not assessed. Left Atrium: The left atrial size was not assessed. Right Ventricle: The right ventricle was not assessed. Right ventricular systolic function not assessed. Right Atrium: The right atrial size was not assessed. Aortic Valve: The aortic valve was not assessed. The aortic valve area by VTI is 2.68 cmï¿½ with a peak velocity of 1.88 m/s. The peak and mean gradients are 13 mmHg and 9 mmHg, respectively, with a dimensionless index of 0.86. Aortic valve regurgitation was not assessed. Status post TAVR Medtronic Evolut FX+ 26mm. Peak gradient 10 mmHg and mean  gradient 4 mmHg. Mitral Valve: The mitral valve is mild to moderately thickened. There is mild to moderate mitral annular calcification. Mitral valve regurgitation was not assessed. Tricuspid Valve: The tricuspid valve was not assessed. There is trace to mild tricuspid regurgitation. Pulmonic Valve: The pulmonic valve was not assessed. There is trace pulmonic valve regurgitation. Pericardium: Pericardial effusion was not assessed. Aorta: The aortic root was not assessed.  CONCLUSIONS:  1. The left ventricular systolic function is normal with a visually estimated ejection fraction of 60-65%.  2. No regional wall motion abnormalities.  3. Trace to mild tricuspid regurgitation.  4. Status post TAVR Medtronic Evolut FX+ 26mm.     Peak gradient 10 mmHg and mean gradient 4 mmHg. QUANTITATIVE DATA SUMMARY:  2D MEASUREMENTS:             Normal Ranges: IVSd:            0.95 cm     (0.6-1.1cm) LVPWd:           0.72 cm     (0.6-1.1cm) LVIDd:           3.05 cm     (3.9-5.9cm) LVIDs:           2.03 cm LV Mass Index:   37.1 g/m2 LVEDV Index:     39.70 ml/m2 LV % FS          33.4 %  LV SYSTOLIC FUNCTION:                      Normal Ranges: EF-A4C View:    66 % (>=55%) EF-A2C View:    65 % EF-Biplane:     65 % EF-Visual:      63 % LV EF Reported: 63 %  AORTIC VALVE:                      Normal Ranges: AoV Vmax:                1.88 m/s  (<=1.7m/s) AoV Peak P.1 mmHg (<20mmHg) AoV Mean P.0 mmHg  (1.7-11.5mmHg) LVOT Max Tay:            1.83 m/s  (<=1.1m/s) AoV VTI:                 42.20 cm  (18-25cm) LVOT VTI:                36.30 cm LVOT Diameter:           1.99 cm   (1.8-2.4cm) AoV Area, VTI:           2.68 cm2  (2.5-5.5cm2) AoV Area,Vmax:           3.03 cm2  (2.5-4.5cm2) AoV Dimensionless Index: 0.86  TRICUSPID VALVE/RVSP:          Normal Ranges: Peak TR Velocity:     2.76 m/s  [id number] Martínez Brennan MD, FACC Electronically signed on 2025 at 9:22:31 PM  ** Final **     Vascular US lower extremity  pseudoaneurysm duplex right  Result Date: 7/9/2025  Interpreted By:  Jorje Onofre, STUDY: VASC US LOWER EXTREMITY PSEUDOANEURYSM DUPLEX RIGHT;  7/9/2025 1:13 pm   INDICATION: Signs/Symptoms:r/o psudeo s/p TAVR.   ,I35.0 Nonrheumatic aortic (valve) stenosis,Z09 Encounter for follow-up examination after completed treatment for conditions other than malignant neoplasm   COMPARISON: CT chest, abdomen and pelvis 16 May 2025   ACCESSION NUMBER(S): [id number]   ORDERING CLINICIAN: DACIA CARL   TECHNIQUE: Transverse and longitudinal grayscale and color Doppler ultrasound of the right groin to evaluate for pseudoaneurysm after TAVR   FINDINGS: Right femoral artery and vein identified   No pseudo aneurysm/AV fistula   No hematoma or other fluid collection       No pseudoaneurysm, arteriovenous fistula, hematoma, abscess or other fluid collection     MACRO: None   Signed by: Jorje Onofre 7/9/2025 1:31 PM Dictation workstation:   [id number]    ECG 12 lead STAT  Result Date: 7/9/2025  Normal sinus rhythm Normal ECG When compared with ECG of 11-JUN-2025 17:15, No significant change was found Confirmed by Donte Jacobsen ([id number]) on 7/9/2025 11:39:43 AM    XR chest 1 view  Result Date: 7/9/2025  Interpreted By:  Jorje Onofre, STUDY: XR CHEST 1 VIEW;  7/9/2025 5:17 am   INDICATION: Signs/Symptoms:s/p TAVR.     COMPARISON: Portable chest, 8 July 2025   ACCESSION NUMBER(S): [id number]   ORDERING CLINICIAN: EMMA ASHBY   TECHNIQUE: Single frontal view of the chest; Portable technique   FINDINGS: Devices unchanged and well-positioned   The cardiomediastinal silhouette is unchanged   Lungs unchanged with bibasilar fibrosis but no acute infiltrate, edema, consolidation, pneumothorax or pleural effusion       No interval change   MACRO: None   Signed by: Jorje Onofre 7/9/2025 7:43 AM Dictation workstation:   [id number]    Cardiac Catheterization Procedure  Result Date: 7/8/2025   [facility], Cath Lab        [address]  [facility] [address] Cardiovascular Catheterization Report Patient Name:      DARVIN SMITH       Performing Physician:  [id number] Ponce MD Study Date:        7/8/2025            Verifying Physician:   Haresh Ponce MD MRN/PID:           [id number]            Cardiologist/Co-Scrub: Accession#:        [id number]        Ordering Provider:     [id number] AMARI PONCE Date of Birth/Age: 1950 / 75 years Cardiologist: Gender:            F                   Fellow:                Miguel Lynch MD Encounter#:        [id number]          Surgeon:               Maria Guadalupe Vivas MD  Study: SAUL - Transcatheter Aortic Valve Implantation  Indications: TAVR in a high surgical risk patient with chronic diastolic and systolic heart failure. Severe aortic stenosis.  Transcatheter Aortic Valve Replacement (TAVR): The right femoral artery was accessed using the transfemoral method (ultrasound guided). A 8 Fr sheath was inserted followed by the deployment of 2 Proglides and then a 14F Sentrant sheath was placed. The left femoral artery was accessed percutaneously and a 6 Gambian contralateral sheath was placed. A 6 Gambian temporary pacemaker was inserted through the right jugular vein and advanced to the right ventricular apex. Adequate pacing thresholds were obtained. After crossing the stenotic aortic valve, balloon aortic valvuloplasty was performed with an 18 x 45 mm True Dilatation. Evolut FX + 26 mm valve was successfully deployed under rapid ventricular pacing at 160 BPM. Transthoracic echo performed post valve deployment revealed no central aortic insufficiency and/or no pericardial effusion and a transvalvular mean gradient of 4 mmHg. No  device related events. No bleeding events occurred during the procedure. No vascular access complications were revealed. Access site was closed using 2 Proglide devices. Hemostasis was achieved in the left femoral artery using a proglide device. Temporary pacing wire was sutured in place.  Hemo Personnel: +----------------------+---------+ Name                  Duty      +----------------------+---------+ Blaine Ponce MD 1 +----------------------+---------+  Hemodynamic Pressures:  +----+------------------+---------+-------------+-------------+------+----- ----+ Site    Date Time       Phase  Systolic mmHg  Diastolic    ED  Mean mmHg                         Name                    mmHg      mmHg           +----+------------------+---------+-------------+-------------+------+----- ----+   AO  7/8/2025 8:21:48 AIR REST            7            2              5                     AM                                                   +----+------------------+---------+-------------+-------------+------+----- ----+   AO  7/8/2025 8:21:48 AIR REST          183           57            105                     AM                                                   +----+------------------+---------+-------------+-------------+------+----- ----+   AO  7/8/2025 8:42:13 AIR REST          151           57             89                     AM                                                   +----+------------------+---------+-------------+-------------+------+----- ----+   AO  7/8/2025 8:42:13 AIR REST          143           46             80                     AM                                                   +----+------------------+---------+-------------+-------------+------+----- ----+   AO  7/8/2025 8:52:09 AIR REST          139           43             77                     AM                                                    +----+------------------+---------+-------------+-------------+------+----- ----+   LV  7/8/2025 8:52:09 AIR REST          172            2    11                              AM                                                   +----+------------------+---------+-------------+-------------+------+----- ----+   AO  7/8/2025 8:52:19 AIR REST          140           42             77                     AM                                                   +----+------------------+---------+-------------+-------------+------+----- ----+   LV  7/8/2025 8:52:19 AIR REST          172            1    13                              AM                                                   +----+------------------+---------+-------------+-------------+------+----- ----+   AO  7/8/2025 8:59:19 AIR REST          148           37             64                     AM                                                   +----+------------------+---------+-------------+-------------+------+----- ----+   LV  7/8/2025 8:59:19 AIR REST          160           38    77                              AM                                                   +----+------------------+---------+-------------+-------------+------+----- ----+   AO  7/8/2025 9:05:07 AIR REST           63           51             57                     AM                                                   +----+------------------+---------+-------------+-------------+------+----- ----+   LV  7/8/2025 9:05:07 AIR REST           82           50    57                              AM                                                   +----+------------------+---------+-------------+-------------+------+----- ----+   AO  7/8/2025 9:05:47 AIR REST          132           41             78                     AM                                                    +----+------------------+---------+-------------+-------------+------+----- ----+   LV  7/8/2025 9:05:47 AIR REST          153           38    42                              AM                                                   +----+------------------+---------+-------------+-------------+------+----- ----+   AO  7/8/2025 9:10:00 AIR REST          157           48             92                     AM                                                   +----+------------------+---------+-------------+-------------+------+----- ----+     7/8/2025 9:10:00 AIR REST          173            3    14                              AM                                                   +----+------------------+---------+-------------+-------------+------+----- ----+  Complications: No vascular access complications were revealed. No bleeding events occurred during the procedure. No device related events.  Cardiac Cath Post Procedure Notes: Post Procedure Diagnosis: TAVR procedure. Blood Loss:               Estimated blood loss during the procedure was 20 mls. Specimens Removed:        Number of specimen(s) removed: none.  Recommendations: CICU care. Bed rest. ASA 81 mg PO daily. Transvenous pacemaker sutured in place. Structural Heart Team to follow. ___________________________________________________________________________ _________ CONCLUSIONS:  1. Transcatheter aortic valve replacement with successful implantation of Evolut FX + 26 mm valve.  2. This patient was enrolled in a research study and data was included in the TVT Registry. ICD 10 Codes: Nonrheumatic aortic (valve) stenosis-I35.0  CPT Codes: SAUL Perc,femoral-47995.62  [id number] Blaine Ponce MD Performing Physician Electronically signed by [id number] Ponce MD on 7/8/2025 at 10:07:50 AM  ** Final (Updated) **     Transthoracic Echo (TTE) Limited  Result Date: 7/8/2025          [place]  [place] [address]  Tel [phone] Fax [phone] TRANSTHORACIC ECHOCARDIOGRAM REPORT Patient Name:       DARVIN SMITH        Reading Physician:    [id number] Martínez Brennan MD, [facility] Study Date:         7/8/2025             Ordering Provider:    [id number] CHACE SUTTON MRN/PID:            [id number]             Fellow: Accession#:         [id number]         Nurse: Date of Birth/Age:  1950 / 75 years  Sonographer:          Cheyanne Saeed                                                                [facility] Gender Assigned at  F                    Additional Staff:     Brenna Mosquera Birth:                                                         RDCS Height:             157.48 cm            Admit Date:           7/8/2025 Weight:             71.67 kg             Admission Status:     Inpatient -                                                                Routine BSA / BMI:          1.73 m2 / 28.90      Department Location:  [address] [place] [address]                     kg/m2 Blood Pressure: 131 /61 mmHg Study Type:    TRANSTHORACIC ECHO (TTE) LIMITED Diagnosis/ICD: Encounter for other preprocedural examination-Z01.818 Indication:    Pre/Fanny--TAVR CPT Codes:     Echo Limited-16465; Color Doppler-05572; Doppler Limited-24880 Patient History: Smoker:            Current. Valve Disorders:   Aortic Stenosis. Pertinent History: DM, SOB, COPD, HTN and Hyperlipidemia. Study Detail: The following Echo studies were performed: 2D, Doppler and color               flow.  PHYSICIAN INTERPRETATION: Left Ventricle: The left ventricular systolic function is normal with a visually estimated ejection fraction of 60-65%. There are no regional wall motion abnormalities. The left ventricular cavity size was not assessed. There is moderately increased septal and mildly increased posterior left  ventricular wall thickness. There is left ventricular concentric remodeling. Left ventricular diastolic filling was not assessed. Left Atrium: The left atrial size was not assessed. Right Ventricle: The right ventricle was not assessed. Right ventricular systolic function not assessed. Right Atrium: The right atrial size was not assessed. Aortic Valve: There is a prosthetic aortic valve present. The aortic valve area by VTI is 0.89 cmï¿½ with a peak velocity of 3.95 m/s. The peak and mean gradients are 59 mmHg and 39 mmHg, respectively, with a dimensionless index of 0.29. Aortic valve regurgitation was not assessed. Pre-TAVR Medtronic Evolut FX+ 26mm. Peak gradient 62 mmHg and mean gradient 38 mmHg. Moderate AI. Post-TAVR peak gradient 10 mmHg and mean gradient 4 mmHg. Dimensionless index 0.95. No AI. Mitral Valve: The mitral valve is moderately thickened. There is moderate mitral annular calcification. There is mild mitral valve regurgitation. Tricuspid Valve: The tricuspid valve was not assessed. There is mild tricuspid regurgitation. The Doppler estimated right ventricular systolic pressure (RVSP) is mildly elevated at 43 mmHg. Pulmonic Valve: The pulmonic valve was not assessed. The pulmonic valve regurgitation was not assessed. Pericardium: Pericardial effusion was not assessed. Aorta: The aortic root was not assessed. Systemic Veins: The inferior vena cava appears normal in size, with IVC inspiratory collapse greater than 50%. In comparison to the previous echocardiogram(s): Comparison study dated 11/29/24 showed an LVEF 60-65%. Mild MR and TR. PG aross the AV of 60 mmHg and mean gradient 37 mmHg.  Post Transcatheter Aortic Valve Placement (TAVR): The peak instantaneous gradient of the aortic valve is 9.7 mmHg. The mean gradient of the aortic valve is 4.0 mmHg. There is a Medtronic Evolut FX+ transcatheter aortic valve replacement, with a 26 mm reported size.  CONCLUSIONS:  1. The left ventricular systolic  function is normal with a visually estimated ejection fraction of 60-65%.  2. No regional wall motion abnormalities.  3. The mitral valve is moderately thickened.  4. There is moderate mitral annular calcification.  5. Mild mitral valve regurgitation.  6. Mild tricuspid regurgitation is visualized.  7. The Doppler estimated RVSP is mildly elevated at 43 mmHg.  8. Post TAVR - there is a Medtronic Evolut FX+ transcatheter aortic valve replacement, with a 26mm reported size.  9. Pre-TAVR Medtronic Evolut FX+ 26mm.     Peak gradient 62 mmHg and mean gradient 38 mmHg. Moderate AI.     Post-TAVR peak gradient 10 mmHg and mean gradient 4 mmHg. Dimensionless index 0.95. No AI. 10. Comparison study dated 11/29/24 showed an LVEF 60-65%. Mild MR and TR. PG aross the AV of 60 mmHg and mean gradient 37 mmHg. 11. There is moderately increased septal and mildly increased posterior left ventricular wall thickness. QUANTITATIVE DATA SUMMARY:  2D MEASUREMENTS:             Normal Ranges: IVSd:            1.36 cm     (0.6-1.1cm) LVPWd:           1.05 cm     (0.6-1.1cm) LVIDd:           3.42 cm     (3.9-5.9cm) LVIDs:           2.31 cm LV Mass Index:   76.4 g/m2 LVEDV Index:     36.54 ml/m2 LV % FS          32.5 %  LEFT ATRIUM:                  Normal Ranges: LA Vol A4C:        33.1 ml    (22+/-6mL/m2) LA Vol A2C:        42.4 ml LA Vol BP:         37.9 ml LA Vol Index A4C:  19.2ml/m2 LA Vol Index A2C:  24.5 ml/m2 LA Vol Index BP:   21.9 ml/m2 LA Area A4C:       14.1 cm2 LA Area A2C:       15.8 cm2 LA Major Axis A4C: 5.1 cm LA Major Axis A2C: 5.0 cm LA Volume Index:   21.2 ml/m2 LA Vol A4C:        31.7 ml LA Vol A2C:        41.1 ml LA Vol Index BSA:  21.0 ml/m2  LV SYSTOLIC FUNCTION:                      Normal Ranges: EF-A4C View:    62 % (>=55%) EF-A2C View:    64 % EF-Biplane:     64 % EF-Visual:      63 % LV EF Reported: 63 %  AORTIC VALVE:                                Normal Ranges: AoV Vmax:                          3.95 m/s   (<=1.7m/s) AoV Vmax Post TAVR:                1.56 m/s  (<=1.7m/s) AoV Peak P.4 mmHg (<20mmHg) AoV Peak PG Post TAVR:             9.7 mmHg  (<20mmHg) AoV Mean P.0 mmHg (1.7-11.5mmHg) AoV Mean PG Post TAVR:             4.0 mmHg  (1.7-11.5mmHg) LVOT Max Tay:                      1.06 m/s  (<=1.1m/s) LVOT Max Tay Post TAVR:            1.19 m/s  (<=1.1m/s) AoV VTI:                           114.00 cm (18-25cm) AoV VTI Post TAVR:                 33.40 cm  (18-25cm) LVOT VTI:                          33.10 cm LVOT VTI Post TAVR:                31.80 cm LVOT Diameter:                     1.98 cm   (1.8-2.4cm) LVOT Diameter Post TAVR:           1.98 cm   (1.8-2.4cm) AoV Area, VTI:                     0.89 cm2  (2.5-5.5cm2) AoV Area, VTI Post TAVR:           2.93 cm2  (2.5-5.5cm2) AoV Area,Vmax:                     0.83 cm2  (2.5-4.5cm2) AoV Area,Vmax Post TAVR:           2.35 cm2  (2.5-4.5cm2) AoV Dimensionless Index:           0.29 AoV Dimensionless Index Post TAVR: 0.95  AORTIC INSUFFICIENCY: AI Vmax:       4.12 m/s AI Half-time:  349 msec AI Decel Rate: 382.00 cm/s2  TRICUSPID VALVE/RVSP:          Normal Ranges: Peak TR Velocity:     3.16 m/s Est. RA Pressure:     3 mmHg RV Syst Pressure:     43 mmHg  (< 30mmHg) IVC Diam:             1.03 cm  [id number] Martínez Brennan MD, FACC Electronically signed on 2025 at 2:03:31 PM  ** Final **     XR chest 1 view  Result Date: 2025  Interpreted By:  Jorje Onofre, STUDY: XR CHEST 1 VIEW;  2025 10:22 am   INDICATION: Signs/Symptoms:s/p TAVR.     COMPARISON: CTA chest, abdomen and pelvis 2025; portable chest 2025   ACCESSION NUMBER(S): [id number]   ORDERING CLINICIAN: EMMA ASHBY   TECHNIQUE: Single frontal view of the chest; Portable technique   FINDINGS: Left side MediPort line is unchanged and well positioned   Right transvenous pacer in-situ   Aortic valve replacement   Cardiomediastinal silhouette  "unchanged in size and configuration   Bibasilar fibrotic changes no different from prior exams   No fresh infiltrate, edema, pneumothorax or pneumomediastinum or pleural effusion       No acute unexpected radiographic findings after aortic valve replacement   MACRO: None   Signed by: Jorje Onofre 7/8/2025 10:29 AM Dictation workstation:   [id number]      EKG:   No results found for: \"EKG\"      Radiology:     No orders to display       Problem List:     Patient Active Problem List   Diagnosis    Drug therapy    Type 2 diabetes mellitus without complication, without long-term current use of insulin    Vitamin D deficiency    Hyperlipidemia    Renal insufficiency    Hypertension    Anxiety and depression    Chronic obstructive pulmonary disease (Multi)    Encounter for care related to vascular access port    Severe aortic stenosis    Benign essential hypertension    Bilateral carotid bruits    Carotid artery stenosis    Chronic dyspnea    Dyspnea on exertion    Mitral valve insufficiency    Murmur, cardiac    Syncope    Atherosclerosis of aorta    Malignant neoplasm of central portion of right female breast, unspecified estrogen receptor status    Type 2 diabetes mellitus with stage 4 chronic kidney disease, without long-term current use of insulin (Multi)    Stage 3 chronic kidney disease, unspecified whether stage 3a or 3b CKD (Multi)    Chronic pain of both knees    Body mass index (BMI) of 29.0 to 29.9 in adult    Former smoker    Acute pharyngitis    Screening for condition    Gastroesophageal reflux disease    Aortic stenosis         ASSESSMENT       45-year-old female here for routine follow-up.    Meds, vitals, examination as noted.    Chart review detail discussed with the patient at length.    Impression:  Severe aortic stenosis  Status post TAVR probably 2 weeks ago  Essential hypertension  Carotid atherosclerosis  MR and TR  Normal coronaries by cath  Normal LV function  PLAN     Recommendation:  Begin " hydrochlorothiazide 12.5 daily in addition to other meds  See me back in 6 weeks  Call if any other issue problems arise  Normal exercise and activity  Call if any other additional problems arise      I, Marisol Nagel RN  am scribing for, and in the presence of Dr. Donte Parson DO .    I, Dr. Donte Parson DO , personally performed the services described in the documentation as scribed by Marisol Nagel RN  in my presence, and confirm it is both accurate and complete.

## 2025-07-28 NOTE — PATIENT INSTRUCTIONS
Continue same medications/treatment.  Patient educated on proper medication use.  Patient educated on risk factor modification.  Please bring any lab results from other providers/physicians to your next appointment.    Please bring all medicines, vitamins, and herbal supplements with you when you come to the office.    Prescriptions will not be filled unless you are compliant with your follow up appointments or have a follow up appointment scheduled as per instruction of your physician. Refills should be requested at the time of your visit.    Follow up in 2 months  Hydrochlorothiazide 12.5 mg daily    IBENJAMIN RN, AM SCRIBING FOR AND IN THE PRESENCE OF DR. UMU CLAROS, DO, FACC

## 2025-07-30 ENCOUNTER — HOSPITAL ENCOUNTER (OUTPATIENT)
Dept: RADIOLOGY | Facility: HOSPITAL | Age: 75
Discharge: HOME | End: 2025-07-30
Payer: MEDICARE

## 2025-07-30 DIAGNOSIS — Z12.31 ENCOUNTER FOR SCREENING MAMMOGRAM FOR MALIGNANT NEOPLASM OF BREAST: ICD-10-CM

## 2025-07-30 PROCEDURE — 77063 BREAST TOMOSYNTHESIS BI: CPT | Performed by: RADIOLOGY

## 2025-07-30 PROCEDURE — 77063 BREAST TOMOSYNTHESIS BI: CPT

## 2025-07-30 PROCEDURE — 77067 SCR MAMMO BI INCL CAD: CPT | Performed by: RADIOLOGY

## 2025-08-04 ENCOUNTER — TELEPHONE (OUTPATIENT)
Dept: PRIMARY CARE | Facility: CLINIC | Age: 75
End: 2025-08-04
Payer: MEDICARE

## 2025-08-07 ENCOUNTER — HOSPITAL ENCOUNTER (EMERGENCY)
Facility: HOSPITAL | Age: 75
Discharge: LEFT BEFORE DISCHARGE | End: 2025-08-07
Payer: MEDICARE

## 2025-08-07 VITALS
TEMPERATURE: 97.9 F | WEIGHT: 156 LBS | OXYGEN SATURATION: 96 % | DIASTOLIC BLOOD PRESSURE: 98 MMHG | SYSTOLIC BLOOD PRESSURE: 155 MMHG | HEART RATE: 86 BPM | RESPIRATION RATE: 20 BRPM | HEIGHT: 62 IN | BODY MASS INDEX: 28.71 KG/M2

## 2025-08-07 PROCEDURE — 4500999001 HC ED NO CHARGE

## 2025-08-07 ASSESSMENT — PAIN DESCRIPTION - ORIENTATION: ORIENTATION: LEFT

## 2025-08-07 ASSESSMENT — PAIN SCALES - GENERAL: PAINLEVEL_OUTOF10: 7

## 2025-08-07 ASSESSMENT — PAIN DESCRIPTION - PAIN TYPE: TYPE: ACUTE PAIN

## 2025-08-07 ASSESSMENT — PAIN - FUNCTIONAL ASSESSMENT: PAIN_FUNCTIONAL_ASSESSMENT: 0-10

## 2025-08-07 ASSESSMENT — PAIN DESCRIPTION - FREQUENCY: FREQUENCY: CONSTANT/CONTINUOUS

## 2025-08-07 ASSESSMENT — PAIN DESCRIPTION - LOCATION: LOCATION: HAND

## 2025-08-08 ENCOUNTER — APPOINTMENT (OUTPATIENT)
Dept: RADIOLOGY | Facility: HOSPITAL | Age: 75
End: 2025-08-08
Payer: MEDICARE

## 2025-08-08 ENCOUNTER — HOSPITAL ENCOUNTER (EMERGENCY)
Facility: HOSPITAL | Age: 75
Discharge: HOME | End: 2025-08-08
Attending: EMERGENCY MEDICINE
Payer: MEDICARE

## 2025-08-08 VITALS
HEIGHT: 62 IN | RESPIRATION RATE: 16 BRPM | DIASTOLIC BLOOD PRESSURE: 73 MMHG | WEIGHT: 156 LBS | OXYGEN SATURATION: 99 % | TEMPERATURE: 97.2 F | SYSTOLIC BLOOD PRESSURE: 171 MMHG | HEART RATE: 60 BPM | BODY MASS INDEX: 28.71 KG/M2

## 2025-08-08 DIAGNOSIS — L03.114 CELLULITIS OF LEFT UPPER EXTREMITY: Primary | ICD-10-CM

## 2025-08-08 PROCEDURE — 73110 X-RAY EXAM OF WRIST: CPT | Mod: LEFT SIDE | Performed by: RADIOLOGY

## 2025-08-08 PROCEDURE — 2500000001 HC RX 250 WO HCPCS SELF ADMINISTERED DRUGS (ALT 637 FOR MEDICARE OP): Performed by: EMERGENCY MEDICINE

## 2025-08-08 PROCEDURE — 99283 EMERGENCY DEPT VISIT LOW MDM: CPT | Performed by: EMERGENCY MEDICINE

## 2025-08-08 PROCEDURE — 73110 X-RAY EXAM OF WRIST: CPT | Mod: LT

## 2025-08-08 RX ORDER — CEPHALEXIN 500 MG/1
500 CAPSULE ORAL ONCE
Status: COMPLETED | OUTPATIENT
Start: 2025-08-08 | End: 2025-08-08

## 2025-08-08 RX ORDER — CEPHALEXIN 500 MG/1
500 CAPSULE ORAL 4 TIMES DAILY
Qty: 28 CAPSULE | Refills: 0 | Status: SHIPPED | OUTPATIENT
Start: 2025-08-08 | End: 2025-08-15

## 2025-08-08 RX ADMIN — CEPHALEXIN 500 MG: 500 CAPSULE ORAL at 09:49

## 2025-08-08 ASSESSMENT — PAIN SCALES - GENERAL: PAINLEVEL_OUTOF10: 8

## 2025-08-08 ASSESSMENT — PAIN - FUNCTIONAL ASSESSMENT: PAIN_FUNCTIONAL_ASSESSMENT: 0-10

## 2025-08-08 ASSESSMENT — PAIN DESCRIPTION - PROGRESSION: CLINICAL_PROGRESSION: NOT CHANGED

## 2025-08-08 ASSESSMENT — PAIN DESCRIPTION - PAIN TYPE: TYPE: ACUTE PAIN

## 2025-08-08 ASSESSMENT — PAIN DESCRIPTION - LOCATION: LOCATION: WRIST

## 2025-08-08 ASSESSMENT — PAIN DESCRIPTION - ORIENTATION: ORIENTATION: LEFT

## 2025-08-08 ASSESSMENT — PAIN DESCRIPTION - DESCRIPTORS: DESCRIPTORS: TIGHTNESS;THROBBING

## 2025-08-08 NOTE — ED PROVIDER NOTES
Emergency Department Provider Note       History of Present Illness     History provided by: Patient  Limitations to History: None  External Records Reviewed with Brief Summary: None    HPI:  Alma Lane is a 75 y.o. female with past medical history of carotid stenosis, diabetes, hyperlipidemia, hypertension, current smoker does present with complaint of worsening left hand and wrist pain.  X 1 day.  No associated fever or chills.  Does have some redness and some swelling.  Denies any trauma.  No rapidly spreading redness up the arm.    Physical Exam   Triage vitals:  T 36.2 °C (97.2 °F)  HR 69  BP (!) 181/77  RR 18  O2 96 % None (Room air)    Physical Exam  Vitals and nursing note reviewed.   Constitutional:       General: She is not in acute distress.     Appearance: Normal appearance. She is normal weight. She is not ill-appearing.   HENT:      Head: Normocephalic and atraumatic.      Nose: Nose normal.      Mouth/Throat:      Mouth: Mucous membranes are moist.      Pharynx: Oropharynx is clear.     Eyes:      Extraocular Movements: Extraocular movements intact.      Conjunctiva/sclera: Conjunctivae normal.      Pupils: Pupils are equal, round, and reactive to light.       Cardiovascular:      Rate and Rhythm: Normal rate and regular rhythm.      Pulses: Normal pulses.      Heart sounds: Normal heart sounds.   Pulmonary:      Effort: Pulmonary effort is normal.      Breath sounds: Normal breath sounds. No stridor. No wheezing.   Abdominal:      General: Abdomen is flat. Bowel sounds are normal. There is no distension.      Palpations: Abdomen is soft.      Tenderness: There is no abdominal tenderness. There is no right CVA tenderness, left CVA tenderness, guarding or rebound.     Musculoskeletal:         General: Tenderness present. No deformity.      Cervical back: Normal range of motion and neck supple.      Comments: Positive tenderness noted to the dorsum of the left hand with mild associated  swelling.  She is able to range the wrist though limited somewhat by pain.  She does not have any crepitus.  No rapidly spreading redness up the wrist.  She is able to fully range the fingers.  She is neurovascularly intact through the fingertips.     Skin:     General: Skin is warm.      Capillary Refill: Capillary refill takes less than 2 seconds.      Coloration: Skin is not pale.      Findings: No bruising.     Neurological:      General: No focal deficit present.      Mental Status: She is alert and oriented to person, place, and time. Mental status is at baseline.      Sensory: No sensory deficit.      Motor: No weakness.     Psychiatric:         Mood and Affect: Mood normal.         Behavior: Behavior normal.         Thought Content: Thought content normal.         Judgment: Judgment normal.           Medical Decision Making & ED Course   Medical Decision Makin y.o. female with past medical history of carcinoma, diabetes, hyperlipidemia, hypertension current smoker does present with complaint of atraumatic left wrist and dorsum of hand pain.  Does appear to be a cellulitis with some associated swelling to the dorsum of the hand as well as overlying skin over the wrist.  She is able to range the wrist tenderness I do not see any signs for septic joint itself.  She does not have any significant joint swelling for gout or septic joint.  I did obtain an x-ray which was negative for gas or pathology fracture pathology.  She will be placed in a wrist splint and provided with Keflex and plan to follow-up with orthopedic walk-in clinic to continue care for suspected hand cellulitis.  ----      Differential diagnoses considered include but are not limited to: septic joint gout cellulitis sprain    Social Determinants of Health which Significantly Impact Care: Social Determinants of Health which Significantly Impact Care: None identified     EKG Independent Interpretation: EKG not obtained    Independent Result  Review and Interpretation: Relevant laboratory and radiographic results were reviewed and independently interpreted by myself.  As necessary, they are commented on in the ED Course.    Chronic conditions affecting the patient's care: As documented above in [facility]    The patient was discussed with the following consultants/services: None    Care Considerations: As documented above in [facility]    ED Course:  Diagnoses as of 08/08/25 1454   Cellulitis of left upper extremity       Disposition   As a result of the work-up, the patient was discharged home.  she was informed of her diagnosis and instructed to come back with any concerns or worsening of condition.  she and was agreeable to the plan as discussed above.  she was given the opportunity to ask questions.  All of the patient's questions were answered.    Procedures   Procedures        Fletcher Sandy MD  Emergency Medicine                                                       Fletcher Sandy MD  08/08/25 1453

## 2025-08-08 NOTE — DISCHARGE INSTRUCTIONS
Please use the splint to help with healing.  Please continue taking Tylenol.  Please take the Keflex as prescribed to treat for possible skin infection and follow-up with the Ortho injury clinic to continue outpatient care.  Please return ED for worsening pain, fever, swelling or any other concerning symptoms.

## 2025-08-09 ENCOUNTER — HOSPITAL ENCOUNTER (EMERGENCY)
Facility: HOSPITAL | Age: 75
Discharge: HOME | End: 2025-08-09
Payer: MEDICARE

## 2025-08-09 ENCOUNTER — APPOINTMENT (OUTPATIENT)
Dept: CARDIOLOGY | Facility: HOSPITAL | Age: 75
End: 2025-08-09
Payer: MEDICARE

## 2025-08-09 ENCOUNTER — APPOINTMENT (OUTPATIENT)
Dept: RADIOLOGY | Facility: HOSPITAL | Age: 75
End: 2025-08-09
Payer: MEDICARE

## 2025-08-09 VITALS
DIASTOLIC BLOOD PRESSURE: 65 MMHG | OXYGEN SATURATION: 96 % | SYSTOLIC BLOOD PRESSURE: 170 MMHG | HEART RATE: 75 BPM | RESPIRATION RATE: 18 BRPM | WEIGHT: 156 LBS | TEMPERATURE: 97.7 F | HEIGHT: 62 IN | BODY MASS INDEX: 28.71 KG/M2

## 2025-08-09 DIAGNOSIS — I95.1 ORTHOSTATIC HYPOTENSION: ICD-10-CM

## 2025-08-09 DIAGNOSIS — R42 LIGHTHEADEDNESS: Primary | ICD-10-CM

## 2025-08-09 LAB
ALBUMIN SERPL BCP-MCNC: 3.6 G/DL (ref 3.4–5)
ALP SERPL-CCNC: 62 U/L (ref 33–136)
ALT SERPL W P-5'-P-CCNC: 8 U/L (ref 7–45)
ANION GAP SERPL CALC-SCNC: 12 MMOL/L (ref 10–20)
AST SERPL W P-5'-P-CCNC: 13 U/L (ref 9–39)
BASOPHILS # BLD AUTO: 0.04 X10*3/UL (ref 0–0.1)
BASOPHILS NFR BLD AUTO: 0.5 %
BILIRUB SERPL-MCNC: 0.4 MG/DL (ref 0–1.2)
BUN SERPL-MCNC: 22 MG/DL (ref 6–23)
CALCIUM SERPL-MCNC: 9.3 MG/DL (ref 8.6–10.3)
CARDIAC TROPONIN I PNL SERPL HS: 10 NG/L (ref 0–13)
CARDIAC TROPONIN I PNL SERPL HS: 11 NG/L (ref 0–13)
CHLORIDE SERPL-SCNC: 104 MMOL/L (ref 98–107)
CO2 SERPL-SCNC: 24 MMOL/L (ref 21–32)
CREAT SERPL-MCNC: 1.64 MG/DL (ref 0.5–1.05)
EGFRCR SERPLBLD CKD-EPI 2021: 32 ML/MIN/1.73M*2
EOSINOPHIL # BLD AUTO: 0.22 X10*3/UL (ref 0–0.4)
EOSINOPHIL NFR BLD AUTO: 2.7 %
ERYTHROCYTE [DISTWIDTH] IN BLOOD BY AUTOMATED COUNT: 15.1 % (ref 11.5–14.5)
GLUCOSE SERPL-MCNC: 139 MG/DL (ref 74–99)
HCT VFR BLD AUTO: 32 % (ref 36–46)
HGB BLD-MCNC: 9.9 G/DL (ref 12–16)
IMM GRANULOCYTES # BLD AUTO: 0.03 X10*3/UL (ref 0–0.5)
IMM GRANULOCYTES NFR BLD AUTO: 0.4 % (ref 0–0.9)
LYMPHOCYTES # BLD AUTO: 2.23 X10*3/UL (ref 0.8–3)
LYMPHOCYTES NFR BLD AUTO: 27.2 %
MAGNESIUM SERPL-MCNC: 1.96 MG/DL (ref 1.6–2.4)
MCH RBC QN AUTO: 29.9 PG (ref 26–34)
MCHC RBC AUTO-ENTMCNC: 30.9 G/DL (ref 32–36)
MCV RBC AUTO: 97 FL (ref 80–100)
MONOCYTES # BLD AUTO: 0.78 X10*3/UL (ref 0.05–0.8)
MONOCYTES NFR BLD AUTO: 9.5 %
NEUTROPHILS # BLD AUTO: 4.9 X10*3/UL (ref 1.6–5.5)
NEUTROPHILS NFR BLD AUTO: 59.7 %
NRBC BLD-RTO: 0 /100 WBCS (ref 0–0)
PLATELET # BLD AUTO: 269 X10*3/UL (ref 150–450)
POTASSIUM SERPL-SCNC: 4.3 MMOL/L (ref 3.5–5.3)
PROT SERPL-MCNC: 7.4 G/DL (ref 6.4–8.2)
RBC # BLD AUTO: 3.31 X10*6/UL (ref 4–5.2)
SODIUM SERPL-SCNC: 136 MMOL/L (ref 136–145)
WBC # BLD AUTO: 8.2 X10*3/UL (ref 4.4–11.3)

## 2025-08-09 PROCEDURE — 71045 X-RAY EXAM CHEST 1 VIEW: CPT

## 2025-08-09 PROCEDURE — 96361 HYDRATE IV INFUSION ADD-ON: CPT

## 2025-08-09 PROCEDURE — 2500000004 HC RX 250 GENERAL PHARMACY W/ HCPCS (ALT 636 FOR OP/ED)

## 2025-08-09 PROCEDURE — 96360 HYDRATION IV INFUSION INIT: CPT

## 2025-08-09 PROCEDURE — 84484 ASSAY OF TROPONIN QUANT: CPT

## 2025-08-09 PROCEDURE — 36415 COLL VENOUS BLD VENIPUNCTURE: CPT

## 2025-08-09 PROCEDURE — 85025 COMPLETE CBC W/AUTO DIFF WBC: CPT

## 2025-08-09 PROCEDURE — 80053 COMPREHEN METABOLIC PANEL: CPT

## 2025-08-09 PROCEDURE — 99285 EMERGENCY DEPT VISIT HI MDM: CPT

## 2025-08-09 PROCEDURE — 83735 ASSAY OF MAGNESIUM: CPT

## 2025-08-09 PROCEDURE — 71045 X-RAY EXAM CHEST 1 VIEW: CPT | Performed by: RADIOLOGY

## 2025-08-09 PROCEDURE — 93005 ELECTROCARDIOGRAM TRACING: CPT

## 2025-08-09 RX ADMIN — SODIUM CHLORIDE 1000 ML: 0.9 INJECTION, SOLUTION INTRAVENOUS at 21:07

## 2025-08-09 ASSESSMENT — LIFESTYLE VARIABLES
HAVE YOU EVER FELT YOU SHOULD CUT DOWN ON YOUR DRINKING: NO
TOTAL SCORE: 0
HAVE PEOPLE ANNOYED YOU BY CRITICIZING YOUR DRINKING: NO
EVER FELT BAD OR GUILTY ABOUT YOUR DRINKING: NO
EVER HAD A DRINK FIRST THING IN THE MORNING TO STEADY YOUR NERVES TO GET RID OF A HANGOVER: NO

## 2025-08-09 ASSESSMENT — PAIN SCALES - GENERAL
PAINLEVEL_OUTOF10: 0 - NO PAIN

## 2025-08-09 ASSESSMENT — PAIN - FUNCTIONAL ASSESSMENT: PAIN_FUNCTIONAL_ASSESSMENT: 0-10

## 2025-08-09 NOTE — ED PROVIDER NOTES
HPI   No chief complaint on file.      History provided by: Patient    Limitations to history: None    CC: Lightheadedness    HPI: 75-year-old female with a history of hypertension, hyperlipidemia, diabetes, carotid artery stenosis presents the emergency department to be evaluated via EMS for lightheadedness.  Patient states for the last few days she has been feeling intermittently lightheaded.  She further characterized the sensation as near fainting/passing out sensation.  She states that the sensation is predominantly when she stands up too quickly and is better when she relaxes and sits down.  Denies dizziness or room spinning sensation.  She believes that she is dehydrated, she has been spending a lot of time outside in the warm weather.  Denies headache and neck pain.  Denies vision changes.  Denies slurred speech or facial drooping.  Denies numbness tingling or weakness in extremities.  She is able to ambulate.  Denies chest pain or shortness of breath.  Denies all GI and  complaints.  Denies weakness and fatigue.  Denies all other systemic symptoms.    ROS: Negative unless mentioned in HPI    Medical Hx: Allergies reviewed.  Immunizations are up-to-date.    Physical exam:    Constitutional: Sitting comfortably in the room and in no distress.  Oriented to person, place, time, and situation.    HEENT: Head is normocephalic, atraumatic. Patient's airway is patent.  Tympanic membranes are clear bilaterally.  Nasal mucosa clear.  Mouth with normal mucosa.  Throat is not erythematous and there are no oropharyngeal exudates, uvula is midline.  No obvious facial deformities.    Eyes: Clear bilaterally.  Pupils are equal round and reactive to light and accommodation.  Extraocular movements intact.      Cardiac: Regular rate, regular rhythm.  Heart sounds S1, S2.  No murmurs, rubs, or gallops.  PMI nondisplaced.  No JVD.    Respiratory: Regular respiratory rate and effort.  Breath sounds are clear and equal  bilaterally, no adventitious lung sounds.  Patient is speaking in full sentences and is in no apparent respiratory distress. No use of accessory muscles.      Gastrointestinal: Abdomen is soft, nondistended, and nontender.  There are no obvious deformities.  No rebound tenderness or guarding.  Bowel sounds are normal active.    Genitourinary: No CVA or flank tenderness.    Musculoskeletal: No reproducible tenderness.  No obvious skin or bony deformities.  Patient has equal range of motion in all extremities and no strength deficiencies.  No muscle or joint tenderness. No back or neck tenderness.  Capillary refill less than 3 seconds.  Strong peripheral pulses.  No sensory deficits.    Neurological: Patient is alert and oriented.  No focal deficits.  5/5 strength in all extremities.  Cranial nerves II through XII intact. GCS15.     Skin: Skin is normal color for race and is warm, dry, and intact.  No evidence of trauma.  No lesions, rashes, bruising, jaundice, or masses.    Psych: Appropriate mood and affect.  No apparent risk to self or others.    Heme/lymph: No adenopathy, lymphadenopathy, or splenomegaly    Physical exam is otherwise negative unless stated above or in history of present illness.              Patient History   Medical History[1]  Surgical History[2]  Family History[3]  Social History[4]    Physical Exam   ED Triage Vitals   Temp Pulse Resp BP   -- -- -- --      SpO2 Temp src Heart Rate Source Patient Position   -- -- -- --      BP Location FiO2 (%)     -- --       Physical Exam      ED Course & MDM   Diagnoses as of 08/09/25 2305   Lightheadedness   Orthostatic hypotension     Patient updated on plan for lab testing, IV insertion, radiology imaging, and medications to be administered while in the ER (if indicated). Patient updated on expected wait times for testing and results. Patient provided my name and told to ask any staff member for questions or concerns if they should arise. Electronic  medical record reviewed.     MDM    Patient presented to the emergency department with the chief complaint lightheadedness.  Examination of the patient's heart and lungs unremarkable.  No neurological deficits.  NIH is 0.     On arrival to the emergency department, vital signs were within normal limits    Will obtain basic blood work, EKG and troponin, magnesium, chest x-ray.  Will give the patient IV fluids.    EKG performed at 2034 and interpreted by me.  Normal sinus rhythm 87 beats minute.  Normal axis.  There is no ST elevation or depression.  No prolonged QT.    Upon reevaluation, patient states that she is feeling better after the fluids.  I updated her on her results.  CBC reveals a normocytic anemia similar to her baseline.  CMP reveals hyperglycemia 139 without signs of DKA.  Patient does have a mild acute kidney injury with a BUN of 22, creatinine of 1.64, GFR of 32.  She was given fluids.  Agnesian is 1.96.  Patient's original troponin is 11 and repeat is 10 making ACS less likely.  Chest x-ray reveals chronic findings but no acute process including cardiomegaly, pulmonary edema, pneumonia.    Based on the patient's history and physical exam, I believe she is likely experiencing orthostatic hypotension.  I did discuss other differentials with the patient.  She does feel well and feels comfortable going home.  Patient will be discharged to follow-up with her primary care provider.  I discussed supportive treatment.  All questions and concerns addressed.  Reasons to return to ER discussed.  Patient verbalized understanding and agreement with the treatment plan and they remained hemodynamically stable in the ER.    This note was dictated using a speech recognition program.  While an attempt was made at proof-reading to minimize errors, minor errors in transcription may be present            No data recorded                                 Medical Decision Making      Procedure  Procedures         [1]   Past  Medical History:  Diagnosis Date    Breast cancer 1915    Carotid artery occlusion     Chronic kidney disease     Chronic obstructive pulmonary disease with (acute) exacerbation (Multi) 04/15/2022    Acute exacerbation of chronic obstructive pulmonary disease (COPD)    Diabetes mellitus (Multi)     Hx antineoplastic chemo     Hyperlipidemia     Hypertension     Personal history of diseases of the blood and blood-forming organs and certain disorders involving the immune mechanism     History of anemia    Personal history of irradiation     Personal history of other diseases of the musculoskeletal system and connective tissue     History of arthritis    Personal history of other diseases of the respiratory system     History of chronic obstructive lung disease    Personal history of other diseases of the respiratory system     History of upper respiratory infection    Personal history of other diseases of the respiratory system     History of acute pharyngitis    Personal history of other diseases of the respiratory system 10/25/2016    History of acute pharyngitis    Personal history of other diseases of the respiratory system     History of pulmonary emphysema    Personal history of other endocrine, nutritional and metabolic disease     History of hyperlipidemia    Personal history of other endocrine, nutritional and metabolic disease     History of diabetes mellitus    Personal history of other specified conditions     History of chest pain    Shortness of breath     Shortness of breath at rest   [2]   Past Surgical History:  Procedure Laterality Date    ANOMALOUS PULMONARY VENOUS RETURN REPAIR, 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    Procedure: TAVR-OR;  Surgeon: Ortega Vivas MD;  Location: [facility];  Service: Cardiac Surgery;  Laterality: N/A;    APPENDECTOMY  10/25/2016    Appendectomy    BREAST BIOPSY  2014    BREAST LUMPECTOMY  2014    BREAST SURGERY  10/25/2016    Breast Surgery    CARDIAC  CATHETERIZATION Left 5/14/2025    Procedure: LHC, With LV;  Surgeon: Blaine Ponce MD;  Location: [facility];  Service: Cardiovascular;  Laterality: Left;  holding    CARDIAC CATHETERIZATION N/A 7/8/2025    Procedure: TAVR (Transcatheter AV Replacement);  Surgeon: Blaine Ponce MD;  Location: [facility];  Service: Cardiovascular;  Laterality: N/A;  TAVR with Medtronic valve.  7/8 0730    CARDIAC ELECTROPHYSIOLOGY PROCEDURE N/A 7/8/2025    Procedure: Temporary Pacemaker Insertion;  Surgeon: Blaine Ponce MD;  Location: [facility];  Service: Cardiovascular;  Laterality: N/A;    COLONOSCOPY  10/25/2016    Complete Colonoscopy    CT ANGIO NECK  10/04/2021    CT NECK ANGIO W AND WO IV CONTRAST 10/4/2021 [facility]    MOUTH SURGERY  10/25/2016    Oral Surgery Tooth Extraction    OTHER SURGICAL HISTORY  01/21/2022    Ganglion cyst excision   [3]   Family History  Problem Relation Name Age of Onset    No Known Problems Mother      Cancer Father      Diabetes Father     [4]   Social History  Tobacco Use    Smoking status: Every Day     Current packs/day: 0.50     Average packs/day: 0.5 packs/day for 56.6 years (28.3 ttl pk-yrs)     Types: Cigarettes     Start date: 1969     Passive exposure: Past    Smokeless tobacco: Never    Tobacco comments:     Last smoked July 8, 2025   Vaping Use    Vaping status: Never Used   Substance Use Topics    Alcohol use: Not Currently    Drug use: Not Currently        Michel Vazquez PA-C  08/09/25 [id number]

## 2025-08-10 LAB
ATRIAL RATE: 87 BPM
P AXIS: 54 DEGREES
P OFFSET: 208 MS
P ONSET: 160 MS
PR INTERVAL: 136 MS
Q ONSET: 228 MS
QRS COUNT: 14 BEATS
QRS DURATION: 76 MS
QT INTERVAL: 372 MS
QTC CALCULATION(BAZETT): 447 MS
QTC FREDERICIA: 420 MS
R AXIS: 26 DEGREES
T AXIS: 61 DEGREES
T OFFSET: 414 MS
VENTRICULAR RATE: 87 BPM

## 2025-08-11 DIAGNOSIS — I35.0 AORTIC STENOSIS: ICD-10-CM

## 2025-08-11 RX ORDER — METOPROLOL TARTRATE 25 MG/1
25 TABLET, FILM COATED ORAL 2 TIMES DAILY
Qty: 180 TABLET | Refills: 3 | Status: SHIPPED | OUTPATIENT
Start: 2025-08-11 | End: 2025-08-15

## 2025-08-12 ENCOUNTER — HOSPITAL ENCOUNTER (OUTPATIENT)
Dept: CARDIOLOGY | Facility: HOSPITAL | Age: 75
Discharge: HOME | End: 2025-08-12
Payer: MEDICARE

## 2025-08-12 DIAGNOSIS — I35.0 AORTIC STENOSIS: ICD-10-CM

## 2025-08-12 DIAGNOSIS — Z95.2 PRESENCE OF PROSTHETIC HEART VALVE: ICD-10-CM

## 2025-08-12 DIAGNOSIS — R42 DIZZINESS AND GIDDINESS: ICD-10-CM

## 2025-08-12 LAB
AORTIC VALVE MEAN GRADIENT: 8 MMHG
AORTIC VALVE PEAK VELOCITY: 1.85 M/S
AV PEAK GRADIENT: 14 MMHG
AVA (PEAK VEL): 2.04 CM2
AVA (VTI): 2.01 CM2
EJECTION FRACTION APICAL 4 CHAMBER: 63.1
EJECTION FRACTION: 60 %
LEFT ATRIUM VOLUME AREA LENGTH INDEX BSA: 28.1 ML/M2
LEFT VENTRICLE INTERNAL DIMENSION DIASTOLE: 3.8 CM (ref 3.5–6)
LEFT VENTRICULAR OUTFLOW TRACT DIAMETER: 2 CM
LV EJECTION FRACTION BIPLANE: 61 %
MITRAL VALVE E/A RATIO: 0.71
RIGHT VENTRICLE FREE WALL PEAK S': 13.8 CM/S
RIGHT VENTRICLE PEAK SYSTOLIC PRESSURE: 37 MMHG
TRICUSPID ANNULAR PLANE SYSTOLIC EXCURSION: 2 CM

## 2025-08-12 PROCEDURE — 93306 TTE W/DOPPLER COMPLETE: CPT | Performed by: INTERNAL MEDICINE

## 2025-08-12 PROCEDURE — 93306 TTE W/DOPPLER COMPLETE: CPT

## 2025-08-15 ENCOUNTER — APPOINTMENT (OUTPATIENT)
Dept: CARDIOLOGY | Facility: CLINIC | Age: 75
End: 2025-08-15
Payer: MEDICARE

## 2025-08-15 ENCOUNTER — TELEPHONE (OUTPATIENT)
Dept: CARDIOLOGY | Facility: CLINIC | Age: 75
End: 2025-08-15

## 2025-08-15 VITALS
BODY MASS INDEX: 28.89 KG/M2 | HEIGHT: 62 IN | DIASTOLIC BLOOD PRESSURE: 70 MMHG | HEART RATE: 100 BPM | WEIGHT: 157 LBS | SYSTOLIC BLOOD PRESSURE: 128 MMHG

## 2025-08-15 DIAGNOSIS — I35.0 AORTIC STENOSIS: ICD-10-CM

## 2025-08-15 DIAGNOSIS — R26.81 UNSTEADY GAIT: ICD-10-CM

## 2025-08-15 DIAGNOSIS — R42 DIZZINESS: ICD-10-CM

## 2025-08-15 DIAGNOSIS — Z95.2 HISTORY OF TRANSCATHETER AORTIC VALVE REPLACEMENT (TAVR): ICD-10-CM

## 2025-08-15 DIAGNOSIS — R00.0 TACHYCARDIA, UNSPECIFIED: ICD-10-CM

## 2025-08-15 DIAGNOSIS — F17.200 CURRENT SMOKER: ICD-10-CM

## 2025-08-15 PROCEDURE — 3074F SYST BP LT 130 MM HG: CPT

## 2025-08-15 PROCEDURE — 1159F MED LIST DOCD IN RCRD: CPT

## 2025-08-15 PROCEDURE — 3078F DIAST BP <80 MM HG: CPT

## 2025-08-15 PROCEDURE — 93000 ELECTROCARDIOGRAM COMPLETE: CPT

## 2025-08-15 PROCEDURE — 99214 OFFICE O/P EST MOD 30 MIN: CPT

## 2025-08-15 RX ORDER — METOPROLOL TARTRATE 50 MG/1
50 TABLET ORAL 2 TIMES DAILY
Qty: 180 TABLET | Refills: 3 | Status: SHIPPED | OUTPATIENT
Start: 2025-08-15

## 2025-08-15 ASSESSMENT — PATIENT HEALTH QUESTIONNAIRE - PHQ9
SUM OF ALL RESPONSES TO PHQ9 QUESTIONS 1 AND 2: 0
2. FEELING DOWN, DEPRESSED OR HOPELESS: NOT AT ALL
1. LITTLE INTEREST OR PLEASURE IN DOING THINGS: NOT AT ALL

## 2025-08-15 ASSESSMENT — COLUMBIA-SUICIDE SEVERITY RATING SCALE - C-SSRS
1. IN THE PAST MONTH, HAVE YOU WISHED YOU WERE DEAD OR WISHED YOU COULD GO TO SLEEP AND NOT WAKE UP?: NO
2. HAVE YOU ACTUALLY HAD ANY THOUGHTS OF KILLING YOURSELF?: NO
6. HAVE YOU EVER DONE ANYTHING, STARTED TO DO ANYTHING, OR PREPARED TO DO ANYTHING TO END YOUR LIFE?: NO

## 2025-08-18 ENCOUNTER — TELEPHONE (OUTPATIENT)
Dept: CARDIOLOGY | Facility: CLINIC | Age: 75
End: 2025-08-18
Payer: MEDICARE

## 2025-08-18 DIAGNOSIS — R55 SYNCOPE, UNSPECIFIED SYNCOPE TYPE: ICD-10-CM

## 2025-08-19 ENCOUNTER — TELEPHONE (OUTPATIENT)
Dept: PRIMARY CARE | Facility: CLINIC | Age: 75
End: 2025-08-19
Payer: MEDICARE

## 2025-08-20 ENCOUNTER — PATIENT OUTREACH (OUTPATIENT)
Dept: CARDIOLOGY | Facility: CLINIC | Age: 75
End: 2025-08-20
Payer: MEDICARE

## 2025-08-20 ENCOUNTER — TELEPHONE (OUTPATIENT)
Dept: CARDIOLOGY | Facility: CLINIC | Age: 75
End: 2025-08-20
Payer: MEDICARE

## 2025-08-21 ENCOUNTER — TELEMEDICINE (OUTPATIENT)
Dept: CARDIOLOGY | Facility: CLINIC | Age: 75
End: 2025-08-21
Payer: MEDICARE

## 2025-08-21 DIAGNOSIS — Z95.2 S/P TAVR (TRANSCATHETER AORTIC VALVE REPLACEMENT): Primary | ICD-10-CM

## 2025-08-21 PROCEDURE — 1160F RVW MEDS BY RX/DR IN RCRD: CPT | Performed by: NURSE PRACTITIONER

## 2025-08-21 PROCEDURE — 1159F MED LIST DOCD IN RCRD: CPT | Performed by: NURSE PRACTITIONER

## 2025-08-21 PROCEDURE — 99213 OFFICE O/P EST LOW 20 MIN: CPT | Performed by: NURSE PRACTITIONER

## 2025-08-21 RX ORDER — AMOXICILLIN 500 MG/1
CAPSULE ORAL
Qty: 4 CAPSULE | Refills: 6 | Status: ON HOLD | OUTPATIENT
Start: 2025-08-21 | End: 2025-08-31

## 2025-08-22 ENCOUNTER — APPOINTMENT (OUTPATIENT)
Dept: RADIOLOGY | Facility: HOSPITAL | Age: 75
DRG: 074 | End: 2025-08-22
Payer: MEDICARE

## 2025-08-22 ENCOUNTER — APPOINTMENT (OUTPATIENT)
Dept: CARDIOLOGY | Facility: HOSPITAL | Age: 75
DRG: 074 | End: 2025-08-22
Payer: MEDICARE

## 2025-08-22 ENCOUNTER — HOSPITAL ENCOUNTER (INPATIENT)
Facility: HOSPITAL | Age: 75
DRG: 074 | End: 2025-08-22
Attending: EMERGENCY MEDICINE | Admitting: HOSPITALIST
Payer: MEDICARE

## 2025-08-22 DIAGNOSIS — R55 SYNCOPE, UNSPECIFIED SYNCOPE TYPE: ICD-10-CM

## 2025-08-22 DIAGNOSIS — L03.114 CELLULITIS OF LEFT UPPER EXTREMITY: ICD-10-CM

## 2025-08-22 DIAGNOSIS — R00.0 TACHYCARDIA, UNSPECIFIED: ICD-10-CM

## 2025-08-22 DIAGNOSIS — N18.9 CHRONIC KIDNEY DISEASE, UNSPECIFIED CKD STAGE: ICD-10-CM

## 2025-08-22 DIAGNOSIS — R42 DIZZINESS: Primary | ICD-10-CM

## 2025-08-22 DIAGNOSIS — I10 HYPERTENSION, UNSPECIFIED TYPE: ICD-10-CM

## 2025-08-22 DIAGNOSIS — I95.1 ORTHOSTATIC HYPOTENSION: ICD-10-CM

## 2025-08-22 DIAGNOSIS — R26.2 DIFFICULTY WALKING: ICD-10-CM

## 2025-08-22 LAB
ALBUMIN SERPL BCP-MCNC: 3.3 G/DL (ref 3.4–5)
ALP SERPL-CCNC: 53 U/L (ref 33–136)
ALT SERPL W P-5'-P-CCNC: 4 U/L (ref 7–45)
ANION GAP SERPL CALC-SCNC: 11 MMOL/L (ref 10–20)
AST SERPL W P-5'-P-CCNC: 10 U/L (ref 9–39)
ATRIAL RATE: 61 BPM
BASOPHILS # BLD AUTO: 0.04 X10*3/UL (ref 0–0.1)
BASOPHILS NFR BLD AUTO: 0.6 %
BILIRUB SERPL-MCNC: 0.3 MG/DL (ref 0–1.2)
BNP SERPL-MCNC: 50 PG/ML (ref 0–99)
BUN SERPL-MCNC: 29 MG/DL (ref 6–23)
CALCIUM SERPL-MCNC: 8.1 MG/DL (ref 8.6–10.3)
CARDIAC TROPONIN I PNL SERPL HS: 6 NG/L (ref 0–13)
CHLORIDE SERPL-SCNC: 108 MMOL/L (ref 98–107)
CO2 SERPL-SCNC: 20 MMOL/L (ref 21–32)
CREAT SERPL-MCNC: 1.46 MG/DL (ref 0.5–1.05)
D DIMER PPP FEU-MCNC: 1726 NG/ML FEU
EGFRCR SERPLBLD CKD-EPI 2021: 37 ML/MIN/1.73M*2
EOSINOPHIL # BLD AUTO: 0.09 X10*3/UL (ref 0–0.4)
EOSINOPHIL NFR BLD AUTO: 1.3 %
ERYTHROCYTE [DISTWIDTH] IN BLOOD BY AUTOMATED COUNT: 15 % (ref 11.5–14.5)
GLUCOSE BLD MANUAL STRIP-MCNC: 142 MG/DL (ref 74–99)
GLUCOSE SERPL-MCNC: 139 MG/DL (ref 74–99)
HCT VFR BLD AUTO: 30.8 % (ref 36–46)
HGB BLD-MCNC: 9.9 G/DL (ref 12–16)
IMM GRANULOCYTES # BLD AUTO: 0.02 X10*3/UL (ref 0–0.5)
IMM GRANULOCYTES NFR BLD AUTO: 0.3 % (ref 0–0.9)
INR PPP: 1.3 (ref 0.9–1.1)
LACTATE SERPL-SCNC: 1.4 MMOL/L (ref 0.4–2)
LYMPHOCYTES # BLD AUTO: 1.58 X10*3/UL (ref 0.8–3)
LYMPHOCYTES NFR BLD AUTO: 22.6 %
MAGNESIUM SERPL-MCNC: 1.84 MG/DL (ref 1.6–2.4)
MCH RBC QN AUTO: 30.3 PG (ref 26–34)
MCHC RBC AUTO-ENTMCNC: 32.1 G/DL (ref 32–36)
MCV RBC AUTO: 94 FL (ref 80–100)
MONOCYTES # BLD AUTO: 0.56 X10*3/UL (ref 0.05–0.8)
MONOCYTES NFR BLD AUTO: 8 %
NEUTROPHILS # BLD AUTO: 4.7 X10*3/UL (ref 1.6–5.5)
NEUTROPHILS NFR BLD AUTO: 67.2 %
NRBC BLD-RTO: 0 /100 WBCS (ref 0–0)
P AXIS: 48 DEGREES
P OFFSET: 191 MS
P ONSET: 143 MS
PLATELET # BLD AUTO: 273 X10*3/UL (ref 150–450)
POTASSIUM SERPL-SCNC: 4 MMOL/L (ref 3.5–5.3)
PR INTERVAL: 156 MS
PROT SERPL-MCNC: 6.3 G/DL (ref 6.4–8.2)
PROTHROMBIN TIME: 14.1 SECONDS (ref 9.8–12.4)
Q ONSET: 221 MS
QRS COUNT: 10 BEATS
QRS DURATION: 82 MS
QT INTERVAL: 432 MS
QTC CALCULATION(BAZETT): 434 MS
QTC FREDERICIA: 434 MS
R AXIS: 58 DEGREES
RBC # BLD AUTO: 3.27 X10*6/UL (ref 4–5.2)
SODIUM SERPL-SCNC: 135 MMOL/L (ref 136–145)
T AXIS: 64 DEGREES
T OFFSET: 437 MS
VENTRICULAR RATE: 61 BPM
WBC # BLD AUTO: 7 X10*3/UL (ref 4.4–11.3)

## 2025-08-22 PROCEDURE — 85379 FIBRIN DEGRADATION QUANT: CPT | Performed by: EMERGENCY MEDICINE

## 2025-08-22 PROCEDURE — 85610 PROTHROMBIN TIME: CPT | Performed by: EMERGENCY MEDICINE

## 2025-08-22 PROCEDURE — 83735 ASSAY OF MAGNESIUM: CPT | Performed by: EMERGENCY MEDICINE

## 2025-08-22 PROCEDURE — 99285 EMERGENCY DEPT VISIT HI MDM: CPT | Mod: 25 | Performed by: EMERGENCY MEDICINE

## 2025-08-22 PROCEDURE — 83880 ASSAY OF NATRIURETIC PEPTIDE: CPT | Performed by: EMERGENCY MEDICINE

## 2025-08-22 PROCEDURE — 71275 CT ANGIOGRAPHY CHEST: CPT

## 2025-08-22 PROCEDURE — 83605 ASSAY OF LACTIC ACID: CPT | Performed by: EMERGENCY MEDICINE

## 2025-08-22 PROCEDURE — 71275 CT ANGIOGRAPHY CHEST: CPT | Performed by: RADIOLOGY

## 2025-08-22 PROCEDURE — 2500000002 HC RX 250 W HCPCS SELF ADMINISTERED DRUGS (ALT 637 FOR MEDICARE OP, ALT 636 FOR OP/ED): Performed by: EMERGENCY MEDICINE

## 2025-08-22 PROCEDURE — 72125 CT NECK SPINE W/O DYE: CPT

## 2025-08-22 PROCEDURE — 2500000001 HC RX 250 WO HCPCS SELF ADMINISTERED DRUGS (ALT 637 FOR MEDICARE OP): Performed by: NURSE PRACTITIONER

## 2025-08-22 PROCEDURE — 71045 X-RAY EXAM CHEST 1 VIEW: CPT

## 2025-08-22 PROCEDURE — 71045 X-RAY EXAM CHEST 1 VIEW: CPT | Performed by: RADIOLOGY

## 2025-08-22 PROCEDURE — 96360 HYDRATION IV INFUSION INIT: CPT

## 2025-08-22 PROCEDURE — 2550000001 HC RX 255 CONTRASTS: Performed by: EMERGENCY MEDICINE

## 2025-08-22 PROCEDURE — 84484 ASSAY OF TROPONIN QUANT: CPT | Performed by: EMERGENCY MEDICINE

## 2025-08-22 PROCEDURE — G0378 HOSPITAL OBSERVATION PER HR: HCPCS

## 2025-08-22 PROCEDURE — 70450 CT HEAD/BRAIN W/O DYE: CPT | Performed by: RADIOLOGY

## 2025-08-22 PROCEDURE — 72125 CT NECK SPINE W/O DYE: CPT | Performed by: RADIOLOGY

## 2025-08-22 PROCEDURE — 85025 COMPLETE CBC W/AUTO DIFF WBC: CPT | Performed by: EMERGENCY MEDICINE

## 2025-08-22 PROCEDURE — 99222 1ST HOSP IP/OBS MODERATE 55: CPT | Performed by: NURSE PRACTITIONER

## 2025-08-22 PROCEDURE — 93005 ELECTROCARDIOGRAM TRACING: CPT

## 2025-08-22 PROCEDURE — 82947 ASSAY GLUCOSE BLOOD QUANT: CPT

## 2025-08-22 PROCEDURE — 70450 CT HEAD/BRAIN W/O DYE: CPT

## 2025-08-22 PROCEDURE — 2500000004 HC RX 250 GENERAL PHARMACY W/ HCPCS (ALT 636 FOR OP/ED): Performed by: EMERGENCY MEDICINE

## 2025-08-22 PROCEDURE — 84075 ASSAY ALKALINE PHOSPHATASE: CPT | Performed by: EMERGENCY MEDICINE

## 2025-08-22 RX ORDER — PIOGLITAZONE 30 MG/1
30 TABLET ORAL DAILY
Status: DISCONTINUED | OUTPATIENT
Start: 2025-08-23 | End: 2025-08-26 | Stop reason: HOSPADM

## 2025-08-22 RX ORDER — LOSARTAN POTASSIUM 50 MG/1
50 TABLET ORAL 2 TIMES DAILY
Status: DISCONTINUED | OUTPATIENT
Start: 2025-08-22 | End: 2025-08-23

## 2025-08-22 RX ORDER — ACETAMINOPHEN 500 MG
10 TABLET ORAL NIGHTLY PRN
Status: DISCONTINUED | OUTPATIENT
Start: 2025-08-22 | End: 2025-08-26 | Stop reason: HOSPADM

## 2025-08-22 RX ORDER — NAPROXEN SODIUM 220 MG/1
81 TABLET, FILM COATED ORAL DAILY
Status: DISCONTINUED | OUTPATIENT
Start: 2025-08-23 | End: 2025-08-26 | Stop reason: HOSPADM

## 2025-08-22 RX ORDER — METOPROLOL TARTRATE 50 MG/1
50 TABLET ORAL 2 TIMES DAILY
Status: DISCONTINUED | OUTPATIENT
Start: 2025-08-22 | End: 2025-08-24

## 2025-08-22 RX ORDER — ACETAMINOPHEN 650 MG/1
650 SUPPOSITORY RECTAL EVERY 4 HOURS PRN
Status: DISCONTINUED | OUTPATIENT
Start: 2025-08-22 | End: 2025-08-26 | Stop reason: HOSPADM

## 2025-08-22 RX ORDER — HYDROCHLOROTHIAZIDE 12.5 MG/1
12.5 TABLET ORAL EVERY MORNING
Status: DISCONTINUED | OUTPATIENT
Start: 2025-08-23 | End: 2025-08-25

## 2025-08-22 RX ORDER — ACETAMINOPHEN 325 MG/1
650 TABLET ORAL EVERY 4 HOURS PRN
Status: DISCONTINUED | OUTPATIENT
Start: 2025-08-22 | End: 2025-08-26 | Stop reason: HOSPADM

## 2025-08-22 RX ORDER — MECLIZINE HCL 12.5 MG 12.5 MG/1
25 TABLET ORAL EVERY 6 HOURS SCHEDULED
Status: DISCONTINUED | OUTPATIENT
Start: 2025-08-23 | End: 2025-08-23

## 2025-08-22 RX ORDER — ACETAMINOPHEN 160 MG/5ML
650 SOLUTION ORAL EVERY 4 HOURS PRN
Status: DISCONTINUED | OUTPATIENT
Start: 2025-08-22 | End: 2025-08-26 | Stop reason: HOSPADM

## 2025-08-22 RX ORDER — MECLIZINE HCL 12.5 MG 12.5 MG/1
12.5 TABLET ORAL ONCE
Status: COMPLETED | OUTPATIENT
Start: 2025-08-22 | End: 2025-08-22

## 2025-08-22 RX ORDER — ROSUVASTATIN CALCIUM 20 MG/1
20 TABLET, COATED ORAL DAILY
Status: DISCONTINUED | OUTPATIENT
Start: 2025-08-23 | End: 2025-08-26 | Stop reason: HOSPADM

## 2025-08-22 RX ORDER — PANTOPRAZOLE SODIUM 20 MG/1
20 TABLET, DELAYED RELEASE ORAL
Status: DISCONTINUED | OUTPATIENT
Start: 2025-08-23 | End: 2025-08-26 | Stop reason: HOSPADM

## 2025-08-22 RX ORDER — POLYETHYLENE GLYCOL 3350 17 G/17G
17 POWDER, FOR SOLUTION ORAL DAILY PRN
Status: DISCONTINUED | OUTPATIENT
Start: 2025-08-22 | End: 2025-08-26 | Stop reason: HOSPADM

## 2025-08-22 RX ORDER — VIT C/E/ZN/COPPR/LUTEIN/ZEAXAN 250MG-90MG
125 CAPSULE ORAL DAILY
Status: DISCONTINUED | OUTPATIENT
Start: 2025-08-23 | End: 2025-08-26 | Stop reason: HOSPADM

## 2025-08-22 RX ADMIN — LOSARTAN POTASSIUM 50 MG: 50 TABLET, FILM COATED ORAL at 22:30

## 2025-08-22 RX ADMIN — MECLIZINE HCL 12.5 MG 12.5 MG: 12.5 TABLET ORAL at 16:50

## 2025-08-22 RX ADMIN — SODIUM CHLORIDE 500 ML: 0.9 INJECTION, SOLUTION INTRAVENOUS at 14:39

## 2025-08-22 RX ADMIN — IOHEXOL 75 ML: 350 INJECTION, SOLUTION INTRAVENOUS at 17:24

## 2025-08-22 RX ADMIN — METOPROLOL TARTRATE 50 MG: 50 TABLET, FILM COATED ORAL at 22:30

## 2025-08-22 SDOH — ECONOMIC STABILITY: INCOME INSECURITY: IN THE PAST 12 MONTHS HAS THE ELECTRIC, GAS, OIL, OR WATER COMPANY THREATENED TO SHUT OFF SERVICES IN YOUR HOME?: NO

## 2025-08-22 SDOH — SOCIAL STABILITY: SOCIAL INSECURITY: HAS ANYONE EVER THREATENED TO HURT YOUR FAMILY OR YOUR PETS?: NO

## 2025-08-22 SDOH — SOCIAL STABILITY: SOCIAL INSECURITY: WERE YOU ABLE TO COMPLETE ALL THE BEHAVIORAL HEALTH SCREENINGS?: YES

## 2025-08-22 SDOH — SOCIAL STABILITY: SOCIAL INSECURITY: WITHIN THE LAST YEAR, HAVE YOU BEEN AFRAID OF YOUR PARTNER OR EX-PARTNER?: NO

## 2025-08-22 SDOH — SOCIAL STABILITY: SOCIAL INSECURITY: ARE THERE ANY APPARENT SIGNS OF INJURIES/BEHAVIORS THAT COULD BE RELATED TO ABUSE/NEGLECT?: NO

## 2025-08-22 SDOH — SOCIAL STABILITY: SOCIAL INSECURITY: WITHIN THE LAST YEAR, HAVE YOU BEEN HUMILIATED OR EMOTIONALLY ABUSED IN OTHER WAYS BY YOUR PARTNER OR EX-PARTNER?: NO

## 2025-08-22 SDOH — ECONOMIC STABILITY: FOOD INSECURITY: WITHIN THE PAST 12 MONTHS, YOU WORRIED THAT YOUR FOOD WOULD RUN OUT BEFORE YOU GOT THE MONEY TO BUY MORE.: NEVER TRUE

## 2025-08-22 SDOH — ECONOMIC STABILITY: FOOD INSECURITY: WITHIN THE PAST 12 MONTHS, THE FOOD YOU BOUGHT JUST DIDN'T LAST AND YOU DIDN'T HAVE MONEY TO GET MORE.: SOMETIMES TRUE

## 2025-08-22 SDOH — SOCIAL STABILITY: SOCIAL INSECURITY: HAVE YOU HAD THOUGHTS OF HARMING ANYONE ELSE?: NO

## 2025-08-22 SDOH — SOCIAL STABILITY: SOCIAL INSECURITY: DO YOU FEEL UNSAFE GOING BACK TO THE PLACE WHERE YOU ARE LIVING?: NO

## 2025-08-22 SDOH — SOCIAL STABILITY: SOCIAL INSECURITY: DOES ANYONE TRY TO KEEP YOU FROM HAVING/CONTACTING OTHER FRIENDS OR DOING THINGS OUTSIDE YOUR HOME?: NO

## 2025-08-22 SDOH — SOCIAL STABILITY: SOCIAL INSECURITY: ARE YOU OR HAVE YOU BEEN THREATENED OR ABUSED PHYSICALLY, EMOTIONALLY, OR SEXUALLY BY ANYONE?: NO

## 2025-08-22 SDOH — SOCIAL STABILITY: SOCIAL INSECURITY: DO YOU FEEL ANYONE HAS EXPLOITED OR TAKEN ADVANTAGE OF YOU FINANCIALLY OR OF YOUR PERSONAL PROPERTY?: NO

## 2025-08-22 SDOH — SOCIAL STABILITY: SOCIAL INSECURITY: ABUSE: ADULT

## 2025-08-22 ASSESSMENT — ENCOUNTER SYMPTOMS
PALPITATIONS: 0
DIZZINESS: 1
FREQUENCY: 0
VOMITING: 0
HEMATURIA: 0
FLANK PAIN: 0
DIARRHEA: 0
CHILLS: 0
DYSURIA: 0
CONSTIPATION: 0
SHORTNESS OF BREATH: 0
NAUSEA: 0
FEVER: 0
ABDOMINAL PAIN: 0

## 2025-08-22 ASSESSMENT — LIFESTYLE VARIABLES
HAVE PEOPLE ANNOYED YOU BY CRITICIZING YOUR DRINKING: NO
EVER FELT BAD OR GUILTY ABOUT YOUR DRINKING: NO
HOW OFTEN DO YOU HAVE A DRINK CONTAINING ALCOHOL: NEVER
AUDIT-C TOTAL SCORE: 0
SKIP TO QUESTIONS 9-10: 1
HAVE YOU EVER FELT YOU SHOULD CUT DOWN ON YOUR DRINKING: NO
HOW OFTEN DO YOU HAVE 6 OR MORE DRINKS ON ONE OCCASION: NEVER
AUDIT-C TOTAL SCORE: 0
HOW MANY STANDARD DRINKS CONTAINING ALCOHOL DO YOU HAVE ON A TYPICAL DAY: PATIENT DOES NOT DRINK
TOTAL SCORE: 0
EVER HAD A DRINK FIRST THING IN THE MORNING TO STEADY YOUR NERVES TO GET RID OF A HANGOVER: NO

## 2025-08-22 ASSESSMENT — ACTIVITIES OF DAILY LIVING (ADL)
PATIENT'S MEMORY ADEQUATE TO SAFELY COMPLETE DAILY ACTIVITIES?: YES
DRESSING YOURSELF: INDEPENDENT
TOILETING: INDEPENDENT
FEEDING YOURSELF: INDEPENDENT
BATHING: INDEPENDENT
GROOMING: INDEPENDENT
LACK_OF_TRANSPORTATION: NO
HEARING - LEFT EAR: FUNCTIONAL
WALKS IN HOME: INDEPENDENT
ASSISTIVE_DEVICE: EYEGLASSES;DENTURES PARTIAL
JUDGMENT_ADEQUATE_SAFELY_COMPLETE_DAILY_ACTIVITIES: YES
HEARING - RIGHT EAR: FUNCTIONAL
ADEQUATE_TO_COMPLETE_ADL: YES

## 2025-08-22 ASSESSMENT — COGNITIVE AND FUNCTIONAL STATUS - GENERAL
DRESSING REGULAR UPPER BODY CLOTHING: A LITTLE
STANDING UP FROM CHAIR USING ARMS: A LITTLE
WALKING IN HOSPITAL ROOM: A LITTLE
TURNING FROM BACK TO SIDE WHILE IN FLAT BAD: A LITTLE
DRESSING REGULAR LOWER BODY CLOTHING: A LITTLE
CLIMB 3 TO 5 STEPS WITH RAILING: A LITTLE
TOILETING: A LITTLE
PERSONAL GROOMING: A LITTLE
DAILY ACTIVITIY SCORE: 18
EATING MEALS: A LITTLE
MOBILITY SCORE: 18
MOVING TO AND FROM BED TO CHAIR: A LITTLE
HELP NEEDED FOR BATHING: A LITTLE
PATIENT BASELINE BEDBOUND: NO
MOVING FROM LYING ON BACK TO SITTING ON SIDE OF FLAT BED WITH BEDRAILS: A LITTLE

## 2025-08-23 ENCOUNTER — APPOINTMENT (OUTPATIENT)
Dept: RADIOLOGY | Facility: HOSPITAL | Age: 75
DRG: 074 | End: 2025-08-23
Payer: MEDICARE

## 2025-08-23 PROBLEM — R42 DIZZINESS: Status: ACTIVE | Noted: 2025-08-23

## 2025-08-23 PROBLEM — I95.1 ORTHOSTATIC HYPOTENSION: Status: ACTIVE | Noted: 2025-08-22

## 2025-08-23 LAB
ANION GAP SERPL CALC-SCNC: 11 MMOL/L (ref 10–20)
APPEARANCE UR: CLEAR
BILIRUB UR STRIP.AUTO-MCNC: NEGATIVE MG/DL
BUN SERPL-MCNC: 29 MG/DL (ref 6–23)
CALCIUM SERPL-MCNC: 9.3 MG/DL (ref 8.6–10.3)
CARDIAC TROPONIN I PNL SERPL HS: 11 NG/L (ref 0–13)
CHLORIDE SERPL-SCNC: 106 MMOL/L (ref 98–107)
CO2 SERPL-SCNC: 26 MMOL/L (ref 21–32)
COLOR UR: ABNORMAL
CREAT SERPL-MCNC: 1.65 MG/DL (ref 0.5–1.05)
EGFRCR SERPLBLD CKD-EPI 2021: 32 ML/MIN/1.73M*2
ERYTHROCYTE [DISTWIDTH] IN BLOOD BY AUTOMATED COUNT: 15.1 % (ref 11.5–14.5)
GLUCOSE SERPL-MCNC: 99 MG/DL (ref 74–99)
GLUCOSE UR STRIP.AUTO-MCNC: NORMAL MG/DL
HCT VFR BLD AUTO: 31.4 % (ref 36–46)
HGB BLD-MCNC: 9.9 G/DL (ref 12–16)
KETONES UR STRIP.AUTO-MCNC: NEGATIVE MG/DL
LEUKOCYTE ESTERASE UR QL STRIP.AUTO: NEGATIVE
MCH RBC QN AUTO: 30.3 PG (ref 26–34)
MCHC RBC AUTO-ENTMCNC: 31.5 G/DL (ref 32–36)
MCV RBC AUTO: 96 FL (ref 80–100)
NITRITE UR QL STRIP.AUTO: NEGATIVE
NRBC BLD-RTO: 0 /100 WBCS (ref 0–0)
PH UR STRIP.AUTO: 5.5 [PH]
PLATELET # BLD AUTO: 282 X10*3/UL (ref 150–450)
POTASSIUM SERPL-SCNC: 4.7 MMOL/L (ref 3.5–5.3)
PROT UR STRIP.AUTO-MCNC: NEGATIVE MG/DL
RBC # BLD AUTO: 3.27 X10*6/UL (ref 4–5.2)
RBC # UR STRIP.AUTO: NEGATIVE MG/DL
SODIUM SERPL-SCNC: 138 MMOL/L (ref 136–145)
SP GR UR STRIP.AUTO: 1.05
UROBILINOGEN UR STRIP.AUTO-MCNC: NORMAL MG/DL
WBC # BLD AUTO: 8.6 X10*3/UL (ref 4.4–11.3)

## 2025-08-23 PROCEDURE — 81003 URINALYSIS AUTO W/O SCOPE: CPT | Performed by: EMERGENCY MEDICINE

## 2025-08-23 PROCEDURE — 36415 COLL VENOUS BLD VENIPUNCTURE: CPT | Performed by: NURSE PRACTITIONER

## 2025-08-23 PROCEDURE — 85027 COMPLETE CBC AUTOMATED: CPT | Performed by: NURSE PRACTITIONER

## 2025-08-23 PROCEDURE — 2500000001 HC RX 250 WO HCPCS SELF ADMINISTERED DRUGS (ALT 637 FOR MEDICARE OP): Performed by: NURSE PRACTITIONER

## 2025-08-23 PROCEDURE — 1210000001 HC SEMI-PRIVATE ROOM DAILY

## 2025-08-23 PROCEDURE — 2500000002 HC RX 250 W HCPCS SELF ADMINISTERED DRUGS (ALT 637 FOR MEDICARE OP, ALT 636 FOR OP/ED): Performed by: NURSE PRACTITIONER

## 2025-08-23 PROCEDURE — 2500000004 HC RX 250 GENERAL PHARMACY W/ HCPCS (ALT 636 FOR OP/ED)

## 2025-08-23 PROCEDURE — 80048 BASIC METABOLIC PNL TOTAL CA: CPT | Performed by: NURSE PRACTITIONER

## 2025-08-23 PROCEDURE — 70551 MRI BRAIN STEM W/O DYE: CPT | Performed by: RADIOLOGY

## 2025-08-23 PROCEDURE — 2500000001 HC RX 250 WO HCPCS SELF ADMINISTERED DRUGS (ALT 637 FOR MEDICARE OP)

## 2025-08-23 PROCEDURE — 70551 MRI BRAIN STEM W/O DYE: CPT

## 2025-08-23 PROCEDURE — 84484 ASSAY OF TROPONIN QUANT: CPT | Performed by: EMERGENCY MEDICINE

## 2025-08-23 PROCEDURE — 99222 1ST HOSP IP/OBS MODERATE 55: CPT | Performed by: STUDENT IN AN ORGANIZED HEALTH CARE EDUCATION/TRAINING PROGRAM

## 2025-08-23 PROCEDURE — 93880 EXTRACRANIAL BILAT STUDY: CPT | Performed by: RADIOLOGY

## 2025-08-23 PROCEDURE — 93880 EXTRACRANIAL BILAT STUDY: CPT

## 2025-08-23 PROCEDURE — 99232 SBSQ HOSP IP/OBS MODERATE 35: CPT

## 2025-08-23 RX ORDER — SODIUM CHLORIDE 9 MG/ML
75 INJECTION, SOLUTION INTRAVENOUS CONTINUOUS
Status: ACTIVE | OUTPATIENT
Start: 2025-08-23 | End: 2025-08-24

## 2025-08-23 RX ORDER — LOSARTAN POTASSIUM 25 MG/1
25 TABLET ORAL 2 TIMES DAILY
Status: DISCONTINUED | OUTPATIENT
Start: 2025-08-23 | End: 2025-08-25

## 2025-08-23 RX ADMIN — MECLIZINE HCL 12.5 MG 25 MG: 12.5 TABLET ORAL at 01:26

## 2025-08-23 RX ADMIN — ROSUVASTATIN CALCIUM 20 MG: 20 TABLET, FILM COATED ORAL at 09:54

## 2025-08-23 RX ADMIN — Medication 125 MCG: at 09:54

## 2025-08-23 RX ADMIN — ASPIRIN 81 MG: 81 TABLET, CHEWABLE ORAL at 09:54

## 2025-08-23 RX ADMIN — Medication 10 MG: at 20:44

## 2025-08-23 RX ADMIN — SODIUM CHLORIDE 75 ML/HR: 0.9 INJECTION, SOLUTION INTRAVENOUS at 16:01

## 2025-08-23 RX ADMIN — LOSARTAN POTASSIUM 50 MG: 50 TABLET, FILM COATED ORAL at 09:54

## 2025-08-23 RX ADMIN — METOPROLOL TARTRATE 50 MG: 50 TABLET, FILM COATED ORAL at 20:44

## 2025-08-23 RX ADMIN — PANTOPRAZOLE 20 MG: 20 TABLET, DELAYED RELEASE ORAL at 06:24

## 2025-08-23 RX ADMIN — LOSARTAN POTASSIUM 25 MG: 25 TABLET, FILM COATED ORAL at 20:44

## 2025-08-23 RX ADMIN — ACETAMINOPHEN 650 MG: 325 TABLET ORAL at 20:44

## 2025-08-23 RX ADMIN — PIOGLITAZONE HYDROCHLORIDE 30 MG: 30 TABLET ORAL at 09:54

## 2025-08-23 RX ADMIN — HYDROCHLOROTHIAZIDE 12.5 MG: 12.5 TABLET ORAL at 09:54

## 2025-08-23 RX ADMIN — ACETAMINOPHEN 650 MG: 325 TABLET ORAL at 06:54

## 2025-08-23 RX ADMIN — MECLIZINE HCL 12.5 MG 25 MG: 12.5 TABLET ORAL at 06:24

## 2025-08-23 RX ADMIN — METOPROLOL TARTRATE 50 MG: 50 TABLET, FILM COATED ORAL at 09:54

## 2025-08-23 ASSESSMENT — COGNITIVE AND FUNCTIONAL STATUS - GENERAL
MOVING TO AND FROM BED TO CHAIR: A LITTLE
STANDING UP FROM CHAIR USING ARMS: A LOT
MOBILITY SCORE: 17
PERSONAL GROOMING: A LITTLE
DRESSING REGULAR UPPER BODY CLOTHING: A LITTLE
PERSONAL GROOMING: A LITTLE
WALKING IN HOSPITAL ROOM: A LOT
DRESSING REGULAR LOWER BODY CLOTHING: A LITTLE
MOBILITY SCORE: 19
DRESSING REGULAR UPPER BODY CLOTHING: A LITTLE
TURNING FROM BACK TO SIDE WHILE IN FLAT BAD: A LITTLE
TOILETING: A LITTLE
HELP NEEDED FOR BATHING: A LITTLE
DAILY ACTIVITIY SCORE: 18
DRESSING REGULAR LOWER BODY CLOTHING: A LITTLE
CLIMB 3 TO 5 STEPS WITH RAILING: A LITTLE
STANDING UP FROM CHAIR USING ARMS: A LITTLE
TOILETING: A LITTLE
HELP NEEDED FOR BATHING: A LITTLE
EATING MEALS: A LITTLE
MOVING TO AND FROM BED TO CHAIR: A LITTLE
EATING MEALS: A LITTLE
DAILY ACTIVITIY SCORE: 18
WALKING IN HOSPITAL ROOM: A LITTLE
CLIMB 3 TO 5 STEPS WITH RAILING: A LOT

## 2025-08-23 ASSESSMENT — PAIN - FUNCTIONAL ASSESSMENT
PAIN_FUNCTIONAL_ASSESSMENT: 0-10
PAIN_FUNCTIONAL_ASSESSMENT: 0-10

## 2025-08-23 ASSESSMENT — PAIN SCALES - GENERAL
PAINLEVEL_OUTOF10: 3
PAINLEVEL_OUTOF10: 0 - NO PAIN

## 2025-08-23 ASSESSMENT — PAIN DESCRIPTION - DESCRIPTORS: DESCRIPTORS: ACHING

## 2025-08-24 LAB
ANION GAP SERPL CALC-SCNC: 10 MMOL/L (ref 10–20)
BUN SERPL-MCNC: 30 MG/DL (ref 6–23)
CALCIUM SERPL-MCNC: 8.7 MG/DL (ref 8.6–10.3)
CHLORIDE SERPL-SCNC: 108 MMOL/L (ref 98–107)
CO2 SERPL-SCNC: 27 MMOL/L (ref 21–32)
CREAT SERPL-MCNC: 1.62 MG/DL (ref 0.5–1.05)
EGFRCR SERPLBLD CKD-EPI 2021: 33 ML/MIN/1.73M*2
ERYTHROCYTE [DISTWIDTH] IN BLOOD BY AUTOMATED COUNT: 15.4 % (ref 11.5–14.5)
GLUCOSE SERPL-MCNC: 99 MG/DL (ref 74–99)
HCT VFR BLD AUTO: 30.3 % (ref 36–46)
HGB BLD-MCNC: 9.5 G/DL (ref 12–16)
HOLD SPECIMEN: NORMAL
MCH RBC QN AUTO: 30 PG (ref 26–34)
MCHC RBC AUTO-ENTMCNC: 31.4 G/DL (ref 32–36)
MCV RBC AUTO: 96 FL (ref 80–100)
NRBC BLD-RTO: 0 /100 WBCS (ref 0–0)
PLATELET # BLD AUTO: 268 X10*3/UL (ref 150–450)
POTASSIUM SERPL-SCNC: 4.8 MMOL/L (ref 3.5–5.3)
RBC # BLD AUTO: 3.17 X10*6/UL (ref 4–5.2)
SODIUM SERPL-SCNC: 140 MMOL/L (ref 136–145)
WBC # BLD AUTO: 6.7 X10*3/UL (ref 4.4–11.3)

## 2025-08-24 PROCEDURE — 80048 BASIC METABOLIC PNL TOTAL CA: CPT | Performed by: NURSE PRACTITIONER

## 2025-08-24 PROCEDURE — 1210000001 HC SEMI-PRIVATE ROOM DAILY

## 2025-08-24 PROCEDURE — 36415 COLL VENOUS BLD VENIPUNCTURE: CPT | Performed by: NURSE PRACTITIONER

## 2025-08-24 PROCEDURE — 2500000004 HC RX 250 GENERAL PHARMACY W/ HCPCS (ALT 636 FOR OP/ED)

## 2025-08-24 PROCEDURE — 99232 SBSQ HOSP IP/OBS MODERATE 35: CPT | Performed by: INTERNAL MEDICINE

## 2025-08-24 PROCEDURE — 2500000001 HC RX 250 WO HCPCS SELF ADMINISTERED DRUGS (ALT 637 FOR MEDICARE OP): Performed by: INTERNAL MEDICINE

## 2025-08-24 PROCEDURE — 85027 COMPLETE CBC AUTOMATED: CPT | Performed by: NURSE PRACTITIONER

## 2025-08-24 PROCEDURE — 2500000001 HC RX 250 WO HCPCS SELF ADMINISTERED DRUGS (ALT 637 FOR MEDICARE OP): Performed by: NURSE PRACTITIONER

## 2025-08-24 PROCEDURE — 2500000001 HC RX 250 WO HCPCS SELF ADMINISTERED DRUGS (ALT 637 FOR MEDICARE OP)

## 2025-08-24 PROCEDURE — 2500000002 HC RX 250 W HCPCS SELF ADMINISTERED DRUGS (ALT 637 FOR MEDICARE OP, ALT 636 FOR OP/ED): Performed by: NURSE PRACTITIONER

## 2025-08-24 RX ORDER — METOPROLOL TARTRATE 25 MG/1
25 TABLET, FILM COATED ORAL 2 TIMES DAILY
Status: DISCONTINUED | OUTPATIENT
Start: 2025-08-24 | End: 2025-08-26 | Stop reason: HOSPADM

## 2025-08-24 RX ADMIN — LOSARTAN POTASSIUM 25 MG: 25 TABLET, FILM COATED ORAL at 09:00

## 2025-08-24 RX ADMIN — PIOGLITAZONE HYDROCHLORIDE 30 MG: 30 TABLET ORAL at 09:00

## 2025-08-24 RX ADMIN — PANTOPRAZOLE 20 MG: 20 TABLET, DELAYED RELEASE ORAL at 06:04

## 2025-08-24 RX ADMIN — METOPROLOL TARTRATE 50 MG: 50 TABLET, FILM COATED ORAL at 09:00

## 2025-08-24 RX ADMIN — LOSARTAN POTASSIUM 25 MG: 25 TABLET, FILM COATED ORAL at 20:39

## 2025-08-24 RX ADMIN — ROSUVASTATIN CALCIUM 20 MG: 20 TABLET, FILM COATED ORAL at 09:00

## 2025-08-24 RX ADMIN — ASPIRIN 81 MG: 81 TABLET, CHEWABLE ORAL at 09:00

## 2025-08-24 RX ADMIN — METOPROLOL TARTRATE 25 MG: 25 TABLET, FILM COATED ORAL at 20:39

## 2025-08-24 RX ADMIN — HYDROCHLOROTHIAZIDE 12.5 MG: 12.5 TABLET ORAL at 09:00

## 2025-08-24 RX ADMIN — Medication 125 MCG: at 09:00

## 2025-08-24 RX ADMIN — SODIUM CHLORIDE 75 ML/HR: 0.9 INJECTION, SOLUTION INTRAVENOUS at 06:03

## 2025-08-24 ASSESSMENT — COGNITIVE AND FUNCTIONAL STATUS - GENERAL
CLIMB 3 TO 5 STEPS WITH RAILING: A LITTLE
MOBILITY SCORE: 20
STANDING UP FROM CHAIR USING ARMS: A LITTLE
DAILY ACTIVITIY SCORE: 18
CLIMB 3 TO 5 STEPS WITH RAILING: A LOT
DRESSING REGULAR LOWER BODY CLOTHING: A LITTLE
EATING MEALS: A LITTLE
DAILY ACTIVITIY SCORE: 23
TOILETING: A LITTLE
TOILETING: A LITTLE
PERSONAL GROOMING: A LITTLE
MOVING TO AND FROM BED TO CHAIR: A LITTLE
HELP NEEDED FOR BATHING: A LITTLE
STANDING UP FROM CHAIR USING ARMS: A LITTLE
WALKING IN HOSPITAL ROOM: A LITTLE
MOBILITY SCORE: 19
DRESSING REGULAR UPPER BODY CLOTHING: A LITTLE
TURNING FROM BACK TO SIDE WHILE IN FLAT BAD: A LITTLE
WALKING IN HOSPITAL ROOM: A LITTLE

## 2025-08-24 ASSESSMENT — PAIN SCALES - GENERAL
PAINLEVEL_OUTOF10: 0 - NO PAIN

## 2025-08-24 ASSESSMENT — ACTIVITIES OF DAILY LIVING (ADL): LACK_OF_TRANSPORTATION: NO

## 2025-08-24 ASSESSMENT — PAIN - FUNCTIONAL ASSESSMENT
PAIN_FUNCTIONAL_ASSESSMENT: 0-10

## 2025-08-25 ENCOUNTER — APPOINTMENT (OUTPATIENT)
Dept: HEMATOLOGY/ONCOLOGY | Facility: CLINIC | Age: 75
End: 2025-08-25
Payer: MEDICARE

## 2025-08-25 ENCOUNTER — APPOINTMENT (OUTPATIENT)
Dept: RADIOLOGY | Facility: HOSPITAL | Age: 75
DRG: 074 | End: 2025-08-25
Payer: MEDICARE

## 2025-08-25 VITALS
RESPIRATION RATE: 16 BRPM | HEIGHT: 62 IN | BODY MASS INDEX: 28.52 KG/M2 | SYSTOLIC BLOOD PRESSURE: 154 MMHG | TEMPERATURE: 99.3 F | WEIGHT: 154.98 LBS | HEART RATE: 66 BPM | OXYGEN SATURATION: 97 % | DIASTOLIC BLOOD PRESSURE: 69 MMHG

## 2025-08-25 LAB
ANION GAP SERPL CALC-SCNC: 9 MMOL/L (ref 10–20)
BUN SERPL-MCNC: 24 MG/DL (ref 6–23)
CALCIUM SERPL-MCNC: 8.8 MG/DL (ref 8.6–10.3)
CHLORIDE SERPL-SCNC: 107 MMOL/L (ref 98–107)
CO2 SERPL-SCNC: 27 MMOL/L (ref 21–32)
CREAT SERPL-MCNC: 1.45 MG/DL (ref 0.5–1.05)
EGFRCR SERPLBLD CKD-EPI 2021: 37 ML/MIN/1.73M*2
ERYTHROCYTE [DISTWIDTH] IN BLOOD BY AUTOMATED COUNT: 15.2 % (ref 11.5–14.5)
GLUCOSE SERPL-MCNC: 92 MG/DL (ref 74–99)
HCT VFR BLD AUTO: 29.3 % (ref 36–46)
HGB BLD-MCNC: 9.2 G/DL (ref 12–16)
HOLD SPECIMEN: NORMAL
HOLD SPECIMEN: NORMAL
MCH RBC QN AUTO: 29.8 PG (ref 26–34)
MCHC RBC AUTO-ENTMCNC: 31.4 G/DL (ref 32–36)
MCV RBC AUTO: 95 FL (ref 80–100)
NRBC BLD-RTO: 0 /100 WBCS (ref 0–0)
PLATELET # BLD AUTO: 243 X10*3/UL (ref 150–450)
POTASSIUM SERPL-SCNC: 4.7 MMOL/L (ref 3.5–5.3)
RBC # BLD AUTO: 3.09 X10*6/UL (ref 4–5.2)
SARS-COV-2 RNA RESP QL NAA+PROBE: NOT DETECTED
SODIUM SERPL-SCNC: 138 MMOL/L (ref 136–145)
WBC # BLD AUTO: 6.8 X10*3/UL (ref 4.4–11.3)

## 2025-08-25 PROCEDURE — 71045 X-RAY EXAM CHEST 1 VIEW: CPT

## 2025-08-25 PROCEDURE — 1210000001 HC SEMI-PRIVATE ROOM DAILY

## 2025-08-25 PROCEDURE — 80048 BASIC METABOLIC PNL TOTAL CA: CPT | Performed by: NURSE PRACTITIONER

## 2025-08-25 PROCEDURE — 2500000001 HC RX 250 WO HCPCS SELF ADMINISTERED DRUGS (ALT 637 FOR MEDICARE OP): Performed by: NURSE PRACTITIONER

## 2025-08-25 PROCEDURE — 2500000001 HC RX 250 WO HCPCS SELF ADMINISTERED DRUGS (ALT 637 FOR MEDICARE OP)

## 2025-08-25 PROCEDURE — 97161 PT EVAL LOW COMPLEX 20 MIN: CPT | Mod: GP | Performed by: PHYSICAL THERAPIST

## 2025-08-25 PROCEDURE — 87635 SARS-COV-2 COVID-19 AMP PRB: CPT | Performed by: INTERNAL MEDICINE

## 2025-08-25 PROCEDURE — 36415 COLL VENOUS BLD VENIPUNCTURE: CPT | Performed by: NURSE PRACTITIONER

## 2025-08-25 PROCEDURE — 2500000002 HC RX 250 W HCPCS SELF ADMINISTERED DRUGS (ALT 637 FOR MEDICARE OP, ALT 636 FOR OP/ED): Performed by: NURSE PRACTITIONER

## 2025-08-25 PROCEDURE — 97165 OT EVAL LOW COMPLEX 30 MIN: CPT | Mod: GO

## 2025-08-25 PROCEDURE — 2500000001 HC RX 250 WO HCPCS SELF ADMINISTERED DRUGS (ALT 637 FOR MEDICARE OP): Performed by: INTERNAL MEDICINE

## 2025-08-25 PROCEDURE — 85027 COMPLETE CBC AUTOMATED: CPT | Performed by: NURSE PRACTITIONER

## 2025-08-25 PROCEDURE — 99232 SBSQ HOSP IP/OBS MODERATE 35: CPT | Performed by: INTERNAL MEDICINE

## 2025-08-25 PROCEDURE — 71045 X-RAY EXAM CHEST 1 VIEW: CPT | Performed by: RADIOLOGY

## 2025-08-25 PROCEDURE — 87081 CULTURE SCREEN ONLY: CPT | Mod: ELYLAB | Performed by: INTERNAL MEDICINE

## 2025-08-25 RX ORDER — LOSARTAN POTASSIUM 25 MG/1
25 TABLET ORAL NIGHTLY
Status: DISCONTINUED | OUTPATIENT
Start: 2025-08-25 | End: 2025-08-26 | Stop reason: HOSPADM

## 2025-08-25 RX ADMIN — Medication 125 MCG: at 09:54

## 2025-08-25 RX ADMIN — METOPROLOL TARTRATE 25 MG: 25 TABLET, FILM COATED ORAL at 09:55

## 2025-08-25 RX ADMIN — PIOGLITAZONE HYDROCHLORIDE 30 MG: 30 TABLET ORAL at 09:54

## 2025-08-25 RX ADMIN — ASPIRIN 81 MG: 81 TABLET, CHEWABLE ORAL at 09:54

## 2025-08-25 RX ADMIN — METOPROLOL TARTRATE 25 MG: 25 TABLET, FILM COATED ORAL at 21:19

## 2025-08-25 RX ADMIN — LOSARTAN POTASSIUM 25 MG: 25 TABLET, FILM COATED ORAL at 09:54

## 2025-08-25 RX ADMIN — HYDROCHLOROTHIAZIDE 12.5 MG: 12.5 TABLET ORAL at 09:54

## 2025-08-25 RX ADMIN — ROSUVASTATIN CALCIUM 20 MG: 20 TABLET, FILM COATED ORAL at 09:54

## 2025-08-25 RX ADMIN — PANTOPRAZOLE 20 MG: 20 TABLET, DELAYED RELEASE ORAL at 06:19

## 2025-08-25 RX ADMIN — LOSARTAN POTASSIUM 25 MG: 25 TABLET, FILM COATED ORAL at 21:19

## 2025-08-25 ASSESSMENT — COGNITIVE AND FUNCTIONAL STATUS - GENERAL
MOVING TO AND FROM BED TO CHAIR: A LITTLE
WALKING IN HOSPITAL ROOM: A LITTLE
STANDING UP FROM CHAIR USING ARMS: A LITTLE
MOBILITY SCORE: 20
DAILY ACTIVITIY SCORE: 18
DRESSING REGULAR UPPER BODY CLOTHING: A LITTLE
DAILY ACTIVITIY SCORE: 23
TOILETING: A LITTLE
HELP NEEDED FOR BATHING: A LOT
CLIMB 3 TO 5 STEPS WITH RAILING: A LOT
STANDING UP FROM CHAIR USING ARMS: A LITTLE
TOILETING: A LITTLE
WALKING IN HOSPITAL ROOM: A LITTLE
MOBILITY SCORE: 20
CLIMB 3 TO 5 STEPS WITH RAILING: A LITTLE
DRESSING REGULAR LOWER BODY CLOTHING: A LOT

## 2025-08-25 ASSESSMENT — PAIN - FUNCTIONAL ASSESSMENT
PAIN_FUNCTIONAL_ASSESSMENT: 0-10

## 2025-08-25 ASSESSMENT — PAIN SCALES - GENERAL
PAINLEVEL_OUTOF10: 0 - NO PAIN

## 2025-08-25 ASSESSMENT — ACTIVITIES OF DAILY LIVING (ADL): BATHING_ASSISTANCE: MODERATE

## 2025-08-26 ENCOUNTER — PHARMACY VISIT (OUTPATIENT)
Dept: PHARMACY | Facility: CLINIC | Age: 75
End: 2025-08-26
Payer: MEDICARE

## 2025-08-26 VITALS
RESPIRATION RATE: 20 BRPM | OXYGEN SATURATION: 95 % | WEIGHT: 154.98 LBS | TEMPERATURE: 99.7 F | DIASTOLIC BLOOD PRESSURE: 66 MMHG | SYSTOLIC BLOOD PRESSURE: 146 MMHG | HEART RATE: 60 BPM | BODY MASS INDEX: 28.52 KG/M2 | HEIGHT: 62 IN

## 2025-08-26 LAB
ANION GAP SERPL CALC-SCNC: 8 MMOL/L (ref 10–20)
BUN SERPL-MCNC: 28 MG/DL (ref 6–23)
CALCIUM SERPL-MCNC: 9.3 MG/DL (ref 8.6–10.3)
CHLORIDE SERPL-SCNC: 106 MMOL/L (ref 98–107)
CO2 SERPL-SCNC: 29 MMOL/L (ref 21–32)
CREAT SERPL-MCNC: 1.69 MG/DL (ref 0.5–1.05)
EGFRCR SERPLBLD CKD-EPI 2021: 31 ML/MIN/1.73M*2
ERYTHROCYTE [DISTWIDTH] IN BLOOD BY AUTOMATED COUNT: 15.1 % (ref 11.5–14.5)
GLUCOSE SERPL-MCNC: 101 MG/DL (ref 74–99)
HCT VFR BLD AUTO: 30.7 % (ref 36–46)
HGB BLD-MCNC: 9.8 G/DL (ref 12–16)
HOLD SPECIMEN: NORMAL
MCH RBC QN AUTO: 30 PG (ref 26–34)
MCHC RBC AUTO-ENTMCNC: 31.9 G/DL (ref 32–36)
MCV RBC AUTO: 94 FL (ref 80–100)
NRBC BLD-RTO: 0 /100 WBCS (ref 0–0)
PLATELET # BLD AUTO: 268 X10*3/UL (ref 150–450)
POTASSIUM SERPL-SCNC: 5 MMOL/L (ref 3.5–5.3)
RBC # BLD AUTO: 3.27 X10*6/UL (ref 4–5.2)
SODIUM SERPL-SCNC: 138 MMOL/L (ref 136–145)
WBC # BLD AUTO: 6.5 X10*3/UL (ref 4.4–11.3)

## 2025-08-26 PROCEDURE — 36415 COLL VENOUS BLD VENIPUNCTURE: CPT | Performed by: NURSE PRACTITIONER

## 2025-08-26 PROCEDURE — 99239 HOSP IP/OBS DSCHRG MGMT >30: CPT | Performed by: STUDENT IN AN ORGANIZED HEALTH CARE EDUCATION/TRAINING PROGRAM

## 2025-08-26 PROCEDURE — 85027 COMPLETE CBC AUTOMATED: CPT | Performed by: NURSE PRACTITIONER

## 2025-08-26 PROCEDURE — 2500000002 HC RX 250 W HCPCS SELF ADMINISTERED DRUGS (ALT 637 FOR MEDICARE OP, ALT 636 FOR OP/ED): Performed by: NURSE PRACTITIONER

## 2025-08-26 PROCEDURE — 80048 BASIC METABOLIC PNL TOTAL CA: CPT | Performed by: NURSE PRACTITIONER

## 2025-08-26 PROCEDURE — 2500000001 HC RX 250 WO HCPCS SELF ADMINISTERED DRUGS (ALT 637 FOR MEDICARE OP): Performed by: NURSE PRACTITIONER

## 2025-08-26 PROCEDURE — 2500000001 HC RX 250 WO HCPCS SELF ADMINISTERED DRUGS (ALT 637 FOR MEDICARE OP): Performed by: INTERNAL MEDICINE

## 2025-08-26 PROCEDURE — 2500000004 HC RX 250 GENERAL PHARMACY W/ HCPCS (ALT 636 FOR OP/ED): Performed by: STUDENT IN AN ORGANIZED HEALTH CARE EDUCATION/TRAINING PROGRAM

## 2025-08-26 PROCEDURE — RXMED WILLOW AMBULATORY MEDICATION CHARGE

## 2025-08-26 RX ORDER — AMOXICILLIN AND CLAVULANATE POTASSIUM 500; 125 MG/1; MG/1
1 TABLET, FILM COATED ORAL 2 TIMES DAILY
Qty: 14 TABLET | Refills: 0 | Status: SHIPPED | OUTPATIENT
Start: 2025-08-26 | End: 2025-09-02

## 2025-08-26 RX ORDER — CEPHALEXIN 500 MG/1
500 CAPSULE ORAL 2 TIMES DAILY
Qty: 14 CAPSULE | Refills: 0 | Status: SHIPPED | OUTPATIENT
Start: 2025-08-26 | End: 2025-08-26 | Stop reason: HOSPADM

## 2025-08-26 RX ORDER — AMOXICILLIN AND CLAVULANATE POTASSIUM 500; 125 MG/1; MG/1
1 TABLET, FILM COATED ORAL 2 TIMES DAILY
Qty: 10 TABLET | Refills: 0 | Status: SHIPPED | OUTPATIENT
Start: 2025-08-26

## 2025-08-26 RX ORDER — METOPROLOL TARTRATE 50 MG/1
25 TABLET ORAL 2 TIMES DAILY
Qty: 30 TABLET | Refills: 5 | Status: SHIPPED | OUTPATIENT
Start: 2025-08-26 | End: 2026-02-22

## 2025-08-26 RX ORDER — LOSARTAN POTASSIUM 50 MG/1
25 TABLET ORAL 2 TIMES DAILY
Qty: 90 TABLET | Refills: 1 | Status: SHIPPED | OUTPATIENT
Start: 2025-08-26 | End: 2026-02-22

## 2025-08-26 RX ORDER — CEFTRIAXONE 2 G/50ML
2 INJECTION, SOLUTION INTRAVENOUS EVERY 24 HOURS
Status: DISCONTINUED | OUTPATIENT
Start: 2025-08-26 | End: 2025-08-26 | Stop reason: HOSPADM

## 2025-08-26 RX ORDER — METOPROLOL TARTRATE 50 MG/1
25 TABLET ORAL 2 TIMES DAILY
Qty: 30 TABLET | Refills: 5 | Status: SHIPPED | OUTPATIENT
Start: 2025-08-26 | End: 2025-08-26

## 2025-08-26 RX ORDER — LOSARTAN POTASSIUM 50 MG/1
25 TABLET ORAL 2 TIMES DAILY
Qty: 90 TABLET | Refills: 0 | Status: SHIPPED | OUTPATIENT
Start: 2025-08-26 | End: 2025-08-26

## 2025-08-26 RX ADMIN — METOPROLOL TARTRATE 25 MG: 25 TABLET, FILM COATED ORAL at 08:15

## 2025-08-26 RX ADMIN — ASPIRIN 81 MG: 81 TABLET, CHEWABLE ORAL at 08:18

## 2025-08-26 RX ADMIN — SODIUM CHLORIDE 250 ML: 0.9 INJECTION, SOLUTION INTRAVENOUS at 10:23

## 2025-08-26 RX ADMIN — PANTOPRAZOLE 20 MG: 20 TABLET, DELAYED RELEASE ORAL at 06:09

## 2025-08-26 RX ADMIN — PIOGLITAZONE HYDROCHLORIDE 30 MG: 30 TABLET ORAL at 08:15

## 2025-08-26 RX ADMIN — CEFTRIAXONE 2 G: 2 INJECTION, SOLUTION INTRAVENOUS at 11:06

## 2025-08-26 RX ADMIN — Medication 125 MCG: at 08:15

## 2025-08-26 RX ADMIN — ROSUVASTATIN CALCIUM 20 MG: 20 TABLET, FILM COATED ORAL at 08:15

## 2025-08-26 ASSESSMENT — COGNITIVE AND FUNCTIONAL STATUS - GENERAL
STANDING UP FROM CHAIR USING ARMS: A LITTLE
PERSONAL GROOMING: A LITTLE
MOBILITY SCORE: 20
TOILETING: A LITTLE
WALKING IN HOSPITAL ROOM: A LITTLE
CLIMB 3 TO 5 STEPS WITH RAILING: A LOT
DAILY ACTIVITIY SCORE: 22

## 2025-08-26 ASSESSMENT — PAIN SCALES - GENERAL: PAINLEVEL_OUTOF10: 0 - NO PAIN

## 2025-08-26 ASSESSMENT — PAIN - FUNCTIONAL ASSESSMENT: PAIN_FUNCTIONAL_ASSESSMENT: 0-10

## 2025-08-27 ENCOUNTER — PATIENT OUTREACH (OUTPATIENT)
Dept: PRIMARY CARE | Facility: CLINIC | Age: 75
End: 2025-08-27
Payer: MEDICARE

## 2025-08-27 LAB
ATRIAL RATE: 61 BPM
HOLD SPECIMEN: NORMAL
P AXIS: 48 DEGREES
P OFFSET: 191 MS
P ONSET: 143 MS
PR INTERVAL: 156 MS
Q ONSET: 221 MS
QRS COUNT: 10 BEATS
QRS DURATION: 82 MS
QT INTERVAL: 432 MS
QTC CALCULATION(BAZETT): 434 MS
QTC FREDERICIA: 434 MS
R AXIS: 58 DEGREES
STAPHYLOCOCCUS SPEC CULT: NORMAL
T AXIS: 64 DEGREES
T OFFSET: 437 MS
VENTRICULAR RATE: 61 BPM

## 2025-08-29 ENCOUNTER — APPOINTMENT (OUTPATIENT)
Dept: RADIOLOGY | Facility: HOSPITAL | Age: 75
End: 2025-08-29
Payer: MEDICARE

## 2025-09-01 ENCOUNTER — HOSPITAL ENCOUNTER (INPATIENT)
Facility: HOSPITAL | Age: 75
Discharge: NURSING, REHAB OR LONG-TERM CARE | End: 2025-09-01
Attending: EMERGENCY MEDICINE | Admitting: STUDENT IN AN ORGANIZED HEALTH CARE EDUCATION/TRAINING PROGRAM
Payer: MEDICARE

## 2025-09-01 DIAGNOSIS — I21.11 ST ELEVATION (STEMI) MYOCARDIAL INFARCTION INVOLVING RIGHT CORONARY ARTERY (MULTI): ICD-10-CM

## 2025-09-01 DIAGNOSIS — I21.3 ST ELEVATION MYOCARDIAL INFARCTION (STEMI), UNSPECIFIED ARTERY (MULTI): ICD-10-CM

## 2025-09-01 DIAGNOSIS — I21.3 STEMI (ST ELEVATION MYOCARDIAL INFARCTION) (MULTI): Primary | ICD-10-CM

## 2025-09-01 LAB
ABO GROUP (TYPE) IN BLOOD: NORMAL
ACT BLD: 353 SEC (ref 83–199)
ALBUMIN SERPL BCP-MCNC: 3.6 G/DL (ref 3.4–5)
ALP SERPL-CCNC: 56 U/L (ref 33–136)
ALT SERPL W P-5'-P-CCNC: 8 U/L (ref 7–45)
ANION GAP SERPL CALC-SCNC: 13 MMOL/L (ref 10–20)
ANTIBODY SCREEN: NORMAL
APTT PPP: NORMAL S
AST SERPL W P-5'-P-CCNC: 13 U/L (ref 9–39)
BASOPHILS # BLD AUTO: 0.03 X10*3/UL (ref 0–0.1)
BASOPHILS NFR BLD AUTO: 0.3 %
BILIRUB SERPL-MCNC: 0.2 MG/DL (ref 0–1.2)
BUN SERPL-MCNC: 37 MG/DL (ref 6–23)
CALCIUM SERPL-MCNC: 8.7 MG/DL (ref 8.6–10.3)
CARDIAC TROPONIN I PNL SERPL HS: 94 NG/L (ref 0–13)
CHLORIDE SERPL-SCNC: 105 MMOL/L (ref 98–107)
CO2 SERPL-SCNC: 21 MMOL/L (ref 21–32)
CREAT SERPL-MCNC: 1.7 MG/DL (ref 0.5–1.05)
EGFRCR SERPLBLD CKD-EPI 2021: 31 ML/MIN/1.73M*2
EOSINOPHIL # BLD AUTO: 0.24 X10*3/UL (ref 0–0.4)
EOSINOPHIL NFR BLD AUTO: 2.1 %
ERYTHROCYTE [DISTWIDTH] IN BLOOD BY AUTOMATED COUNT: 15 % (ref 11.5–14.5)
GLUCOSE SERPL-MCNC: 120 MG/DL (ref 74–99)
HCT VFR BLD AUTO: 28.3 % (ref 36–46)
HGB BLD-MCNC: 9.2 G/DL (ref 12–16)
IMM GRANULOCYTES # BLD AUTO: 0.03 X10*3/UL (ref 0–0.5)
IMM GRANULOCYTES NFR BLD AUTO: 0.3 % (ref 0–0.9)
INR PPP: 1.1 (ref 0.9–1.1)
LYMPHOCYTES # BLD AUTO: 6.43 X10*3/UL (ref 0.8–3)
LYMPHOCYTES NFR BLD AUTO: 55.1 %
MCH RBC QN AUTO: 30.5 PG (ref 26–34)
MCHC RBC AUTO-ENTMCNC: 32.5 G/DL (ref 32–36)
MCV RBC AUTO: 94 FL (ref 80–100)
MONOCYTES # BLD AUTO: 0.9 X10*3/UL (ref 0.05–0.8)
MONOCYTES NFR BLD AUTO: 7.7 %
NEUTROPHILS # BLD AUTO: 4.05 X10*3/UL (ref 1.6–5.5)
NEUTROPHILS NFR BLD AUTO: 34.5 %
NRBC BLD-RTO: 0 /100 WBCS (ref 0–0)
PLATELET # BLD AUTO: 253 X10*3/UL (ref 150–450)
POTASSIUM SERPL-SCNC: 4.2 MMOL/L (ref 3.5–5.3)
PROT SERPL-MCNC: 6.8 G/DL (ref 6.4–8.2)
PROTHROMBIN TIME: 11.9 SECONDS (ref 9.8–12.4)
RBC # BLD AUTO: 3.02 X10*6/UL (ref 4–5.2)
RH FACTOR (ANTIGEN D): NORMAL
SODIUM SERPL-SCNC: 135 MMOL/L (ref 136–145)
WBC # BLD AUTO: 11.7 X10*3/UL (ref 4.4–11.3)

## 2025-09-01 PROCEDURE — 85025 COMPLETE CBC W/AUTO DIFF WBC: CPT | Performed by: EMERGENCY MEDICINE

## 2025-09-01 PROCEDURE — C1769 GUIDE WIRE: HCPCS | Performed by: STUDENT IN AN ORGANIZED HEALTH CARE EDUCATION/TRAINING PROGRAM

## 2025-09-01 PROCEDURE — 85730 THROMBOPLASTIN TIME PARTIAL: CPT | Performed by: EMERGENCY MEDICINE

## 2025-09-01 PROCEDURE — 99285 EMERGENCY DEPT VISIT HI MDM: CPT | Performed by: EMERGENCY MEDICINE

## 2025-09-01 PROCEDURE — 2550000001 HC RX 255 CONTRASTS: Mod: JW

## 2025-09-01 PROCEDURE — 99291 CRITICAL CARE FIRST HOUR: CPT | Performed by: STUDENT IN AN ORGANIZED HEALTH CARE EDUCATION/TRAINING PROGRAM

## 2025-09-01 PROCEDURE — 2720000007 HC OR 272 NO HCPCS: Performed by: STUDENT IN AN ORGANIZED HEALTH CARE EDUCATION/TRAINING PROGRAM

## 2025-09-01 PROCEDURE — 99152 MOD SED SAME PHYS/QHP 5/>YRS: CPT | Performed by: STUDENT IN AN ORGANIZED HEALTH CARE EDUCATION/TRAINING PROGRAM

## 2025-09-01 PROCEDURE — C1760 CLOSURE DEV, VASC: HCPCS | Performed by: STUDENT IN AN ORGANIZED HEALTH CARE EDUCATION/TRAINING PROGRAM

## 2025-09-01 PROCEDURE — 80053 COMPREHEN METABOLIC PANEL: CPT | Performed by: EMERGENCY MEDICINE

## 2025-09-01 PROCEDURE — 84484 ASSAY OF TROPONIN QUANT: CPT | Performed by: EMERGENCY MEDICINE

## 2025-09-01 PROCEDURE — 92920 PRQ TRLUML C ANGIOP 1ART&/BR: CPT | Performed by: STUDENT IN AN ORGANIZED HEALTH CARE EDUCATION/TRAINING PROGRAM

## 2025-09-01 PROCEDURE — C1887 CATHETER, GUIDING: HCPCS | Performed by: STUDENT IN AN ORGANIZED HEALTH CARE EDUCATION/TRAINING PROGRAM

## 2025-09-01 PROCEDURE — 99153 MOD SED SAME PHYS/QHP EA: CPT | Performed by: STUDENT IN AN ORGANIZED HEALTH CARE EDUCATION/TRAINING PROGRAM

## 2025-09-01 PROCEDURE — 85347 COAGULATION TIME ACTIVATED: CPT | Performed by: STUDENT IN AN ORGANIZED HEALTH CARE EDUCATION/TRAINING PROGRAM

## 2025-09-01 PROCEDURE — 93010 ELECTROCARDIOGRAM REPORT: CPT | Performed by: INTERNAL MEDICINE

## 2025-09-01 PROCEDURE — 86901 BLOOD TYPING SEROLOGIC RH(D): CPT | Performed by: EMERGENCY MEDICINE

## 2025-09-01 PROCEDURE — 85347 COAGULATION TIME ACTIVATED: CPT

## 2025-09-01 PROCEDURE — 2500000004 HC RX 250 GENERAL PHARMACY W/ HCPCS (ALT 636 FOR OP/ED)

## 2025-09-01 PROCEDURE — 71045 X-RAY EXAM CHEST 1 VIEW: CPT | Performed by: STUDENT IN AN ORGANIZED HEALTH CARE EDUCATION/TRAINING PROGRAM

## 2025-09-01 PROCEDURE — 93458 L HRT ARTERY/VENTRICLE ANGIO: CPT | Mod: 59 | Performed by: STUDENT IN AN ORGANIZED HEALTH CARE EDUCATION/TRAINING PROGRAM

## 2025-09-01 PROCEDURE — 2020000001 HC ICU ROOM DAILY

## 2025-09-01 PROCEDURE — C1894 INTRO/SHEATH, NON-LASER: HCPCS | Performed by: STUDENT IN AN ORGANIZED HEALTH CARE EDUCATION/TRAINING PROGRAM

## 2025-09-01 PROCEDURE — C1725 CATH, TRANSLUMIN NON-LASER: HCPCS | Performed by: STUDENT IN AN ORGANIZED HEALTH CARE EDUCATION/TRAINING PROGRAM

## 2025-09-01 PROCEDURE — 93458 L HRT ARTERY/VENTRICLE ANGIO: CPT | Performed by: STUDENT IN AN ORGANIZED HEALTH CARE EDUCATION/TRAINING PROGRAM

## 2025-09-01 PROCEDURE — 83036 HEMOGLOBIN GLYCOSYLATED A1C: CPT | Mod: ELYLAB | Performed by: STUDENT IN AN ORGANIZED HEALTH CARE EDUCATION/TRAINING PROGRAM

## 2025-09-01 PROCEDURE — 2500000005 HC RX 250 GENERAL PHARMACY W/O HCPCS

## 2025-09-01 PROCEDURE — 85610 PROTHROMBIN TIME: CPT | Performed by: EMERGENCY MEDICINE

## 2025-09-01 PROCEDURE — 92941 PRQ TRLML REVSC TOT OCCL AMI: CPT | Performed by: STUDENT IN AN ORGANIZED HEALTH CARE EDUCATION/TRAINING PROGRAM

## 2025-09-01 RX ORDER — MORPHINE SULFATE 2 MG/ML
2 INJECTION, SOLUTION INTRAMUSCULAR; INTRAVENOUS EVERY 4 HOURS PRN
Status: DISCONTINUED | OUTPATIENT
Start: 2025-09-01 | End: 2025-09-02

## 2025-09-01 RX ORDER — PNV NO.95/FERROUS FUM/FOLIC AC 28MG-0.8MG
100 TABLET ORAL DAILY
Status: DISPENSED | OUTPATIENT
Start: 2025-09-02

## 2025-09-01 RX ORDER — METOPROLOL TARTRATE 25 MG/1
25 TABLET, FILM COATED ORAL 2 TIMES DAILY
Status: DISCONTINUED | OUTPATIENT
Start: 2025-09-01 | End: 2025-09-01

## 2025-09-01 RX ORDER — ACETAMINOPHEN 325 MG/1
650 TABLET ORAL ONCE
Status: COMPLETED | OUTPATIENT
Start: 2025-09-02 | End: 2025-09-02

## 2025-09-01 RX ORDER — ACETAMINOPHEN 325 MG/1
650 TABLET ORAL EVERY 6 HOURS PRN
Status: DISPENSED | OUTPATIENT
Start: 2025-09-01

## 2025-09-01 RX ORDER — LOSARTAN POTASSIUM 25 MG/1
25 TABLET ORAL 2 TIMES DAILY
Status: ACTIVE | OUTPATIENT
Start: 2025-09-01

## 2025-09-01 RX ORDER — TICAGRELOR 90 MG/1
90 TABLET, FILM COATED ORAL 2 TIMES DAILY
Status: DISCONTINUED | OUTPATIENT
Start: 2025-09-02 | End: 2025-09-01

## 2025-09-01 RX ORDER — PIOGLITAZONE 30 MG/1
30 TABLET ORAL DAILY
Status: DISPENSED | OUTPATIENT
Start: 2025-09-02

## 2025-09-01 RX ORDER — DEXTROSE 50 % IN WATER (D50W) INTRAVENOUS SYRINGE
12.5
Status: ACTIVE | OUTPATIENT
Start: 2025-09-01

## 2025-09-01 RX ORDER — LIDOCAINE HYDROCHLORIDE 20 MG/ML
INJECTION, SOLUTION INFILTRATION; PERINEURAL AS NEEDED
Status: DISCONTINUED | OUTPATIENT
Start: 2025-09-01 | End: 2025-09-01 | Stop reason: HOSPADM

## 2025-09-01 RX ORDER — PANTOPRAZOLE SODIUM 40 MG/1
40 TABLET, DELAYED RELEASE ORAL DAILY
Status: DISPENSED | OUTPATIENT
Start: 2025-09-02

## 2025-09-01 RX ORDER — POLYETHYLENE GLYCOL 3350 17 G/17G
17 POWDER, FOR SOLUTION ORAL DAILY PRN
Status: ACTIVE | OUTPATIENT
Start: 2025-09-01

## 2025-09-01 RX ORDER — METOPROLOL TARTRATE 25 MG/1
25 TABLET, FILM COATED ORAL 2 TIMES DAILY
Status: DISPENSED | OUTPATIENT
Start: 2025-09-02

## 2025-09-01 RX ORDER — NITROGLYCERIN 40 MG/100ML
INJECTION INTRAVENOUS AS NEEDED
Status: DISCONTINUED | OUTPATIENT
Start: 2025-09-01 | End: 2025-09-01 | Stop reason: HOSPADM

## 2025-09-01 RX ORDER — HEPARIN SODIUM 1000 [USP'U]/ML
INJECTION, SOLUTION INTRAVENOUS; SUBCUTANEOUS AS NEEDED
Status: DISCONTINUED | OUTPATIENT
Start: 2025-09-01 | End: 2025-09-01 | Stop reason: HOSPADM

## 2025-09-01 RX ORDER — VIT C/E/ZN/COPPR/LUTEIN/ZEAXAN 250MG-90MG
125 CAPSULE ORAL DAILY
Status: DISPENSED | OUTPATIENT
Start: 2025-09-02

## 2025-09-01 RX ORDER — HEPARIN SODIUM 5000 [USP'U]/ML
5000 INJECTION, SOLUTION INTRAVENOUS; SUBCUTANEOUS EVERY 8 HOURS
Status: DISPENSED | OUTPATIENT
Start: 2025-09-02

## 2025-09-01 RX ORDER — INSULIN LISPRO 100 [IU]/ML
0-5 INJECTION, SOLUTION INTRAVENOUS; SUBCUTANEOUS
Status: ACTIVE | OUTPATIENT
Start: 2025-09-02

## 2025-09-01 RX ORDER — METOCLOPRAMIDE HYDROCHLORIDE 5 MG/ML
5 INJECTION INTRAMUSCULAR; INTRAVENOUS ONCE
Status: COMPLETED | OUTPATIENT
Start: 2025-09-02 | End: 2025-09-02

## 2025-09-01 RX ORDER — SODIUM CHLORIDE 9 MG/ML
100 INJECTION, SOLUTION INTRAVENOUS CONTINUOUS
Status: DISCONTINUED | OUTPATIENT
Start: 2025-09-02 | End: 2025-09-02

## 2025-09-01 RX ORDER — METOCLOPRAMIDE HYDROCHLORIDE 5 MG/ML
10 INJECTION INTRAMUSCULAR; INTRAVENOUS EVERY 8 HOURS PRN
Status: ACTIVE | OUTPATIENT
Start: 2025-09-01

## 2025-09-01 RX ORDER — ROSUVASTATIN CALCIUM 20 MG/1
20 TABLET, COATED ORAL DAILY
Status: DISPENSED | OUTPATIENT
Start: 2025-09-02

## 2025-09-01 RX ORDER — NAPROXEN SODIUM 220 MG/1
81 TABLET, FILM COATED ORAL DAILY
Status: DISPENSED | OUTPATIENT
Start: 2025-09-02

## 2025-09-01 RX ORDER — DEXTROSE 50 % IN WATER (D50W) INTRAVENOUS SYRINGE
25
Status: ACTIVE | OUTPATIENT
Start: 2025-09-01

## 2025-09-01 RX ORDER — FENTANYL CITRATE 50 UG/ML
INJECTION, SOLUTION INTRAMUSCULAR; INTRAVENOUS AS NEEDED
Status: DISCONTINUED | OUTPATIENT
Start: 2025-09-01 | End: 2025-09-01 | Stop reason: HOSPADM

## 2025-09-01 RX ORDER — MIDAZOLAM HYDROCHLORIDE 1 MG/ML
INJECTION, SOLUTION INTRAMUSCULAR; INTRAVENOUS AS NEEDED
Status: DISCONTINUED | OUTPATIENT
Start: 2025-09-01 | End: 2025-09-01 | Stop reason: HOSPADM

## 2025-09-01 RX ORDER — BENZONATATE 100 MG/1
100 CAPSULE ORAL 3 TIMES DAILY PRN
Status: ACTIVE | OUTPATIENT
Start: 2025-09-01

## 2025-09-01 RX ORDER — MORPHINE SULFATE 2 MG/ML
2 INJECTION, SOLUTION INTRAMUSCULAR; INTRAVENOUS ONCE
Status: COMPLETED | OUTPATIENT
Start: 2025-09-02 | End: 2025-09-02

## 2025-09-01 SDOH — SOCIAL STABILITY: SOCIAL INSECURITY: ARE YOU MARRIED, WIDOWED, DIVORCED, SEPARATED, NEVER MARRIED, OR LIVING WITH A PARTNER?: WIDOWED

## 2025-09-01 SDOH — SOCIAL STABILITY: SOCIAL NETWORK: HOW OFTEN DO YOU ATTEND CHURCH OR RELIGIOUS SERVICES?: 1 TO 4 TIMES PER YEAR

## 2025-09-01 SDOH — ECONOMIC STABILITY: FOOD INSECURITY: WITHIN THE PAST 12 MONTHS, THE FOOD YOU BOUGHT JUST DIDN'T LAST AND YOU DIDN'T HAVE MONEY TO GET MORE.: NEVER TRUE

## 2025-09-01 SDOH — SOCIAL STABILITY: SOCIAL INSECURITY: DO YOU FEEL UNSAFE GOING BACK TO THE PLACE WHERE YOU ARE LIVING?: NO

## 2025-09-01 SDOH — HEALTH STABILITY: PHYSICAL HEALTH
HOW OFTEN DO YOU NEED TO HAVE SOMEONE HELP YOU WHEN YOU READ INSTRUCTIONS, PAMPHLETS, OR OTHER WRITTEN MATERIAL FROM YOUR DOCTOR OR PHARMACY?: SOMETIMES

## 2025-09-01 SDOH — SOCIAL STABILITY: SOCIAL NETWORK
IN A TYPICAL WEEK, HOW MANY TIMES DO YOU TALK ON THE PHONE WITH FAMILY, FRIENDS, OR NEIGHBORS?: MORE THAN THREE TIMES A WEEK

## 2025-09-01 SDOH — SOCIAL STABILITY: SOCIAL INSECURITY: ABUSE: ADULT

## 2025-09-01 SDOH — HEALTH STABILITY: PHYSICAL HEALTH: ON AVERAGE, HOW MANY DAYS PER WEEK DO YOU ENGAGE IN MODERATE TO STRENUOUS EXERCISE (LIKE A BRISK WALK)?: 0 DAYS

## 2025-09-01 SDOH — ECONOMIC STABILITY: HOUSING INSECURITY: IN THE LAST 12 MONTHS, WAS THERE A TIME WHEN YOU WERE NOT ABLE TO PAY THE MORTGAGE OR RENT ON TIME?: NO

## 2025-09-01 SDOH — ECONOMIC STABILITY: HOUSING INSECURITY: AT ANY TIME IN THE PAST 12 MONTHS, WERE YOU HOMELESS OR LIVING IN A SHELTER (INCLUDING NOW)?: NO

## 2025-09-01 SDOH — SOCIAL STABILITY: SOCIAL NETWORK: HOW OFTEN DO YOU ATTEND MEETINGS OF THE CLUBS OR ORGANIZATIONS YOU BELONG TO?: NEVER

## 2025-09-01 SDOH — SOCIAL STABILITY: SOCIAL INSECURITY: WITHIN THE LAST YEAR, HAVE YOU BEEN HUMILIATED OR EMOTIONALLY ABUSED IN OTHER WAYS BY YOUR PARTNER OR EX-PARTNER?: NO

## 2025-09-01 SDOH — HEALTH STABILITY: PHYSICAL HEALTH: ON AVERAGE, HOW MANY MINUTES DO YOU ENGAGE IN EXERCISE AT THIS LEVEL?: 0 MIN

## 2025-09-01 SDOH — ECONOMIC STABILITY: TRANSPORTATION INSECURITY: IN THE PAST 12 MONTHS, HAS LACK OF TRANSPORTATION KEPT YOU FROM MEDICAL APPOINTMENTS OR FROM GETTING MEDICATIONS?: NO

## 2025-09-01 SDOH — SOCIAL STABILITY: SOCIAL NETWORK: HOW OFTEN DO YOU ATTEND CHURCH OR RELIGIOUS SERVICES?: NEVER

## 2025-09-01 SDOH — SOCIAL STABILITY: SOCIAL INSECURITY: HAVE YOU HAD THOUGHTS OF HARMING ANYONE ELSE?: NO

## 2025-09-01 SDOH — HEALTH STABILITY: MENTAL HEALTH
DO YOU FEEL STRESS - TENSE, RESTLESS, NERVOUS, OR ANXIOUS, OR UNABLE TO SLEEP AT NIGHT BECAUSE YOUR MIND IS TROUBLED ALL THE TIME - THESE DAYS?: NOT AT ALL

## 2025-09-01 SDOH — SOCIAL STABILITY: SOCIAL INSECURITY: WITHIN THE LAST YEAR, HAVE YOU BEEN AFRAID OF YOUR PARTNER OR EX-PARTNER?: NO

## 2025-09-01 SDOH — ECONOMIC STABILITY: FOOD INSECURITY: HOW HARD IS IT FOR YOU TO PAY FOR THE VERY BASICS LIKE FOOD, HOUSING, MEDICAL CARE, AND HEATING?: NOT HARD AT ALL

## 2025-09-01 SDOH — ECONOMIC STABILITY: INCOME INSECURITY: IN THE PAST 12 MONTHS HAS THE ELECTRIC, GAS, OIL, OR WATER COMPANY THREATENED TO SHUT OFF SERVICES IN YOUR HOME?: NO

## 2025-09-01 SDOH — SOCIAL STABILITY: SOCIAL NETWORK
DO YOU BELONG TO ANY CLUBS OR ORGANIZATIONS SUCH AS CHURCH GROUPS, UNIONS, FRATERNAL OR ATHLETIC GROUPS, OR SCHOOL GROUPS?: NO

## 2025-09-01 SDOH — SOCIAL STABILITY: SOCIAL INSECURITY: ARE YOU OR HAVE YOU BEEN THREATENED OR ABUSED PHYSICALLY, EMOTIONALLY, OR SEXUALLY BY ANYONE?: NO

## 2025-09-01 SDOH — SOCIAL STABILITY: SOCIAL NETWORK: HOW OFTEN DO YOU GET TOGETHER WITH FRIENDS OR RELATIVES?: ONCE A WEEK

## 2025-09-01 SDOH — ECONOMIC STABILITY: HOUSING INSECURITY: IN THE PAST 12 MONTHS, HOW MANY TIMES HAVE YOU MOVED WHERE YOU WERE LIVING?: 0

## 2025-09-01 SDOH — ECONOMIC STABILITY: FOOD INSECURITY: WITHIN THE PAST 12 MONTHS, YOU WORRIED THAT YOUR FOOD WOULD RUN OUT BEFORE YOU GOT THE MONEY TO BUY MORE.: NEVER TRUE

## 2025-09-01 SDOH — SOCIAL STABILITY: SOCIAL INSECURITY: HAS ANYONE EVER THREATENED TO HURT YOUR FAMILY OR YOUR PETS?: NO

## 2025-09-01 SDOH — SOCIAL STABILITY: SOCIAL INSECURITY: DO YOU FEEL ANYONE HAS EXPLOITED OR TAKEN ADVANTAGE OF YOU FINANCIALLY OR OF YOUR PERSONAL PROPERTY?: NO

## 2025-09-01 SDOH — SOCIAL STABILITY: SOCIAL INSECURITY: ARE THERE ANY APPARENT SIGNS OF INJURIES/BEHAVIORS THAT COULD BE RELATED TO ABUSE/NEGLECT?: NO

## 2025-09-01 SDOH — SOCIAL STABILITY: SOCIAL INSECURITY: DOES ANYONE TRY TO KEEP YOU FROM HAVING/CONTACTING OTHER FRIENDS OR DOING THINGS OUTSIDE YOUR HOME?: NO

## 2025-09-01 SDOH — SOCIAL STABILITY: SOCIAL INSECURITY: WERE YOU ABLE TO COMPLETE ALL THE BEHAVIORAL HEALTH SCREENINGS?: YES

## 2025-09-01 SDOH — SOCIAL STABILITY: SOCIAL INSECURITY: HAVE YOU HAD ANY THOUGHTS OF HARMING ANYONE ELSE?: NO

## 2025-09-01 ASSESSMENT — COGNITIVE AND FUNCTIONAL STATUS - GENERAL
MOBILITY SCORE: 24
DAILY ACTIVITIY SCORE: 24
PATIENT BASELINE BEDBOUND: NO

## 2025-09-01 ASSESSMENT — PAIN DESCRIPTION - PAIN TYPE: TYPE: ACUTE PAIN

## 2025-09-01 ASSESSMENT — PAIN - FUNCTIONAL ASSESSMENT: PAIN_FUNCTIONAL_ASSESSMENT: 0-10

## 2025-09-01 ASSESSMENT — PAIN SCALES - GENERAL: PAINLEVEL_OUTOF10: 10 - WORST POSSIBLE PAIN

## 2025-09-01 ASSESSMENT — ACTIVITIES OF DAILY LIVING (ADL)
JUDGMENT_ADEQUATE_SAFELY_COMPLETE_DAILY_ACTIVITIES: YES
GROOMING: INDEPENDENT
ADEQUATE_TO_COMPLETE_ADL: YES
HEARING - RIGHT EAR: FUNCTIONAL
LACK_OF_TRANSPORTATION: NO
BATHING: INDEPENDENT
FEEDING YOURSELF: INDEPENDENT
TOILETING: INDEPENDENT
LACK_OF_TRANSPORTATION: NO
DRESSING YOURSELF: INDEPENDENT
HEARING - LEFT EAR: FUNCTIONAL
PATIENT'S MEMORY ADEQUATE TO SAFELY COMPLETE DAILY ACTIVITIES?: YES
WALKS IN HOME: INDEPENDENT

## 2025-09-01 ASSESSMENT — LIFESTYLE VARIABLES
SUBSTANCE_ABUSE_PAST_12_MONTHS: NO
AUDIT-C TOTAL SCORE: 0
HOW MANY STANDARD DRINKS CONTAINING ALCOHOL DO YOU HAVE ON A TYPICAL DAY: PATIENT DOES NOT DRINK
PRESCIPTION_ABUSE_PAST_12_MONTHS: NO
HOW OFTEN DO YOU HAVE A DRINK CONTAINING ALCOHOL: NEVER
HOW OFTEN DO YOU HAVE 6 OR MORE DRINKS ON ONE OCCASION: NEVER
AUDIT-C TOTAL SCORE: 0
SKIP TO QUESTIONS 9-10: 1

## 2025-09-01 ASSESSMENT — PAIN DESCRIPTION - ORIENTATION: ORIENTATION: MID

## 2025-09-01 ASSESSMENT — PATIENT HEALTH QUESTIONNAIRE - PHQ9
1. LITTLE INTEREST OR PLEASURE IN DOING THINGS: NOT AT ALL
SUM OF ALL RESPONSES TO PHQ9 QUESTIONS 1 & 2: 0
2. FEELING DOWN, DEPRESSED OR HOPELESS: NOT AT ALL

## 2025-09-01 ASSESSMENT — PAIN DESCRIPTION - LOCATION: LOCATION: CHEST

## 2025-09-02 ENCOUNTER — APPOINTMENT (OUTPATIENT)
Dept: CARDIOLOGY | Facility: HOSPITAL | Age: 75
End: 2025-09-02
Payer: MEDICARE

## 2025-09-02 LAB
ALBUMIN SERPL BCP-MCNC: 3.2 G/DL (ref 3.4–5)
ANION GAP SERPL CALC-SCNC: 10 MMOL/L (ref 10–20)
AORTIC VALVE MEAN GRADIENT: 8 MMHG
AORTIC VALVE PEAK VELOCITY: 1.77 M/S
AV PEAK GRADIENT: 13 MMHG
AVA (PEAK VEL): 2.64 CM2
AVA (VTI): 2.37 CM2
BASOPHILS # BLD AUTO: 0.03 X10*3/UL (ref 0–0.1)
BASOPHILS NFR BLD AUTO: 0.4 %
BUN SERPL-MCNC: 33 MG/DL (ref 6–23)
CALCIUM SERPL-MCNC: 8.5 MG/DL (ref 8.6–10.3)
CHLORIDE SERPL-SCNC: 107 MMOL/L (ref 98–107)
CO2 SERPL-SCNC: 21 MMOL/L (ref 21–32)
CREAT SERPL-MCNC: 1.47 MG/DL (ref 0.5–1.05)
EGFRCR SERPLBLD CKD-EPI 2021: 37 ML/MIN/1.73M*2
EJECTION FRACTION APICAL 4 CHAMBER: 62
EJECTION FRACTION: 58 %
EOSINOPHIL # BLD AUTO: 0.17 X10*3/UL (ref 0–0.4)
EOSINOPHIL NFR BLD AUTO: 2.1 %
ERYTHROCYTE [DISTWIDTH] IN BLOOD BY AUTOMATED COUNT: 14.7 % (ref 11.5–14.5)
EST. AVERAGE GLUCOSE BLD GHB EST-MCNC: 140 MG/DL
GLUCOSE BLD MANUAL STRIP-MCNC: 112 MG/DL (ref 74–99)
GLUCOSE BLD MANUAL STRIP-MCNC: 168 MG/DL (ref 74–99)
GLUCOSE BLD MANUAL STRIP-MCNC: 171 MG/DL (ref 74–99)
GLUCOSE SERPL-MCNC: 127 MG/DL (ref 74–99)
HBA1C MFR BLD: 6.5 % (ref ?–5.7)
HCT VFR BLD AUTO: 27.5 % (ref 36–46)
HGB BLD-MCNC: 9.1 G/DL (ref 12–16)
HOLD SPECIMEN: NORMAL
HOLD SPECIMEN: NORMAL
IMM GRANULOCYTES # BLD AUTO: 0.04 X10*3/UL (ref 0–0.5)
IMM GRANULOCYTES NFR BLD AUTO: 0.5 % (ref 0–0.9)
LEFT ATRIUM VOLUME AREA LENGTH INDEX BSA: 18.8 ML/M2
LEFT VENTRICLE INTERNAL DIMENSION DIASTOLE: 3.38 CM (ref 3.5–6)
LEFT VENTRICULAR OUTFLOW TRACT DIAMETER: 1.9 CM
LV EJECTION FRACTION BIPLANE: 61 %
LYMPHOCYTES # BLD AUTO: 2.42 X10*3/UL (ref 0.8–3)
LYMPHOCYTES NFR BLD AUTO: 29.9 %
MAGNESIUM SERPL-MCNC: 2.06 MG/DL (ref 1.6–2.4)
MCH RBC QN AUTO: 30.5 PG (ref 26–34)
MCHC RBC AUTO-ENTMCNC: 33.1 G/DL (ref 32–36)
MCV RBC AUTO: 92 FL (ref 80–100)
MITRAL VALVE E/A RATIO: 0.55
MONOCYTES # BLD AUTO: 0.78 X10*3/UL (ref 0.05–0.8)
MONOCYTES NFR BLD AUTO: 9.6 %
NEUTROPHILS # BLD AUTO: 4.66 X10*3/UL (ref 1.6–5.5)
NEUTROPHILS NFR BLD AUTO: 57.5 %
NRBC BLD-RTO: 0 /100 WBCS (ref 0–0)
PHOSPHATE SERPL-MCNC: 3.4 MG/DL (ref 2.5–4.9)
PLATELET # BLD AUTO: 228 X10*3/UL (ref 150–450)
POTASSIUM SERPL-SCNC: 4.2 MMOL/L (ref 3.5–5.3)
RBC # BLD AUTO: 2.98 X10*6/UL (ref 4–5.2)
RIGHT VENTRICLE FREE WALL PEAK S': 19.5 CM/S
RIGHT VENTRICLE PEAK SYSTOLIC PRESSURE: 39 MMHG
SODIUM SERPL-SCNC: 134 MMOL/L (ref 136–145)
TRICUSPID ANNULAR PLANE SYSTOLIC EXCURSION: 2.1 CM
WBC # BLD AUTO: 8.1 X10*3/UL (ref 4.4–11.3)

## 2025-09-02 PROCEDURE — 97165 OT EVAL LOW COMPLEX 30 MIN: CPT | Mod: GO

## 2025-09-02 PROCEDURE — 2500000001 HC RX 250 WO HCPCS SELF ADMINISTERED DRUGS (ALT 637 FOR MEDICARE OP): Performed by: NURSE PRACTITIONER

## 2025-09-02 PROCEDURE — C8929 TTE W OR WO FOL WCON,DOPPLER: HCPCS

## 2025-09-02 PROCEDURE — 2500000002 HC RX 250 W HCPCS SELF ADMINISTERED DRUGS (ALT 637 FOR MEDICARE OP, ALT 636 FOR OP/ED): Performed by: STUDENT IN AN ORGANIZED HEALTH CARE EDUCATION/TRAINING PROGRAM

## 2025-09-02 PROCEDURE — 2500000004 HC RX 250 GENERAL PHARMACY W/ HCPCS (ALT 636 FOR OP/ED): Performed by: STUDENT IN AN ORGANIZED HEALTH CARE EDUCATION/TRAINING PROGRAM

## 2025-09-02 PROCEDURE — 36415 COLL VENOUS BLD VENIPUNCTURE: CPT | Performed by: STUDENT IN AN ORGANIZED HEALTH CARE EDUCATION/TRAINING PROGRAM

## 2025-09-02 PROCEDURE — 99233 SBSQ HOSP IP/OBS HIGH 50: CPT | Performed by: NURSE PRACTITIONER

## 2025-09-02 PROCEDURE — 99223 1ST HOSP IP/OBS HIGH 75: CPT | Performed by: INTERNAL MEDICINE

## 2025-09-02 PROCEDURE — 82947 ASSAY GLUCOSE BLOOD QUANT: CPT

## 2025-09-02 PROCEDURE — 83735 ASSAY OF MAGNESIUM: CPT | Performed by: STUDENT IN AN ORGANIZED HEALTH CARE EDUCATION/TRAINING PROGRAM

## 2025-09-02 PROCEDURE — 99291 CRITICAL CARE FIRST HOUR: CPT | Mod: 25 | Performed by: EMERGENCY MEDICINE

## 2025-09-02 PROCEDURE — 2060000001 HC INTERMEDIATE ICU ROOM DAILY

## 2025-09-02 PROCEDURE — 93306 TTE W/DOPPLER COMPLETE: CPT | Performed by: INTERNAL MEDICINE

## 2025-09-02 PROCEDURE — 2500000001 HC RX 250 WO HCPCS SELF ADMINISTERED DRUGS (ALT 637 FOR MEDICARE OP): Performed by: STUDENT IN AN ORGANIZED HEALTH CARE EDUCATION/TRAINING PROGRAM

## 2025-09-02 PROCEDURE — 93010 ELECTROCARDIOGRAM REPORT: CPT | Performed by: INTERNAL MEDICINE

## 2025-09-02 PROCEDURE — 97161 PT EVAL LOW COMPLEX 20 MIN: CPT | Mod: GP | Performed by: PHYSICAL THERAPIST

## 2025-09-02 PROCEDURE — 85025 COMPLETE CBC W/AUTO DIFF WBC: CPT | Performed by: STUDENT IN AN ORGANIZED HEALTH CARE EDUCATION/TRAINING PROGRAM

## 2025-09-02 PROCEDURE — 80069 RENAL FUNCTION PANEL: CPT | Performed by: STUDENT IN AN ORGANIZED HEALTH CARE EDUCATION/TRAINING PROGRAM

## 2025-09-02 RX ORDER — SODIUM CHLORIDE 9 MG/ML
1.5 INJECTION, SOLUTION INTRAVENOUS CONTINUOUS
Status: ACTIVE | OUTPATIENT
Start: 2025-09-02 | End: 2025-09-03

## 2025-09-02 RX ADMIN — MORPHINE SULFATE 2 MG: 2 INJECTION, SOLUTION INTRAMUSCULAR; INTRAVENOUS at 00:14

## 2025-09-02 RX ADMIN — METOCLOPRAMIDE HYDROCHLORIDE 5 MG: 5 INJECTION INTRAMUSCULAR; INTRAVENOUS at 00:15

## 2025-09-02 RX ADMIN — SODIUM CHLORIDE 100 ML/HR: 9 INJECTION, SOLUTION INTRAVENOUS at 00:26

## 2025-09-02 RX ADMIN — PIOGLITAZONE HYDROCHLORIDE 30 MG: 30 TABLET ORAL at 09:35

## 2025-09-02 RX ADMIN — METOPROLOL TARTRATE 25 MG: 25 TABLET, FILM COATED ORAL at 09:35

## 2025-09-02 RX ADMIN — ACETAMINOPHEN 650 MG: 325 TABLET ORAL at 07:57

## 2025-09-02 RX ADMIN — HEPARIN SODIUM 5000 UNITS: 5000 INJECTION, SOLUTION INTRAVENOUS; SUBCUTANEOUS at 09:35

## 2025-09-02 RX ADMIN — ACETAMINOPHEN 650 MG: 325 TABLET ORAL at 00:14

## 2025-09-02 RX ADMIN — VITAM B12 100 MCG: 100 TAB at 09:35

## 2025-09-02 RX ADMIN — Medication 125 MCG: at 09:35

## 2025-09-02 RX ADMIN — ROSUVASTATIN CALCIUM 20 MG: 20 TABLET, FILM COATED ORAL at 09:35

## 2025-09-02 RX ADMIN — HEPARIN SODIUM 5000 UNITS: 5000 INJECTION, SOLUTION INTRAVENOUS; SUBCUTANEOUS at 18:31

## 2025-09-02 RX ADMIN — METOPROLOL TARTRATE 25 MG: 25 TABLET, FILM COATED ORAL at 20:08

## 2025-09-02 RX ADMIN — PERFLUTREN 2 ML OF DILUTION: 6.52 INJECTION, SUSPENSION INTRAVENOUS at 09:15

## 2025-09-02 RX ADMIN — ACETAMINOPHEN 650 MG: 325 TABLET ORAL at 20:08

## 2025-09-02 RX ADMIN — ASPIRIN 81 MG: 81 TABLET, CHEWABLE ORAL at 09:35

## 2025-09-02 RX ADMIN — PANTOPRAZOLE SODIUM 40 MG: 40 TABLET, DELAYED RELEASE ORAL at 06:04

## 2025-09-02 RX ADMIN — SODIUM CHLORIDE 1.5 ML/KG/HR: 9 INJECTION, SOLUTION INTRAVENOUS at 21:00

## 2025-09-02 ASSESSMENT — COGNITIVE AND FUNCTIONAL STATUS - GENERAL
WALKING IN HOSPITAL ROOM: TOTAL
CLIMB 3 TO 5 STEPS WITH RAILING: TOTAL
MOVING FROM LYING ON BACK TO SITTING ON SIDE OF FLAT BED WITH BEDRAILS: A LITTLE
MOVING TO AND FROM BED TO CHAIR: A LITTLE
DRESSING REGULAR LOWER BODY CLOTHING: A LOT
HELP NEEDED FOR BATHING: A LOT
DRESSING REGULAR UPPER BODY CLOTHING: A LITTLE
DAILY ACTIVITIY SCORE: 16
MOBILITY SCORE: 24
STANDING UP FROM CHAIR USING ARMS: A LITTLE
PERSONAL GROOMING: A LITTLE
TURNING FROM BACK TO SIDE WHILE IN FLAT BAD: A LITTLE
TOILETING: A LOT
MOBILITY SCORE: 14
DAILY ACTIVITIY SCORE: 24

## 2025-09-02 ASSESSMENT — PAIN SCALES - GENERAL
PAINLEVEL_OUTOF10: 0 - NO PAIN
PAINLEVEL_OUTOF10: 3
PAINLEVEL_OUTOF10: 0 - NO PAIN
PAINLEVEL_OUTOF10: 6
PAINLEVEL_OUTOF10: 2
PAINLEVEL_OUTOF10: 0 - NO PAIN

## 2025-09-02 ASSESSMENT — PAIN - FUNCTIONAL ASSESSMENT
PAIN_FUNCTIONAL_ASSESSMENT: 0-10

## 2025-09-02 ASSESSMENT — ACTIVITIES OF DAILY LIVING (ADL): BATHING_ASSISTANCE: MODERATE

## 2025-09-03 LAB
ALBUMIN SERPL BCP-MCNC: 3.2 G/DL (ref 3.4–5)
ANION GAP SERPL CALC-SCNC: 10 MMOL/L (ref 10–20)
ATRIAL RATE: 78 BPM
ATRIAL RATE: 81 BPM
ATRIAL RATE: 89 BPM
BASOPHILS # BLD AUTO: 0.04 X10*3/UL (ref 0–0.1)
BASOPHILS NFR BLD AUTO: 0.5 %
BUN SERPL-MCNC: 26 MG/DL (ref 6–23)
CALCIUM SERPL-MCNC: 8.8 MG/DL (ref 8.6–10.3)
CHLORIDE SERPL-SCNC: 109 MMOL/L (ref 98–107)
CO2 SERPL-SCNC: 22 MMOL/L (ref 21–32)
CREAT SERPL-MCNC: 1.36 MG/DL (ref 0.5–1.05)
EGFRCR SERPLBLD CKD-EPI 2021: 40 ML/MIN/1.73M*2
EOSINOPHIL # BLD AUTO: 0.26 X10*3/UL (ref 0–0.4)
EOSINOPHIL NFR BLD AUTO: 3.4 %
ERYTHROCYTE [DISTWIDTH] IN BLOOD BY AUTOMATED COUNT: 14.9 % (ref 11.5–14.5)
GLUCOSE BLD MANUAL STRIP-MCNC: 106 MG/DL (ref 74–99)
GLUCOSE BLD MANUAL STRIP-MCNC: 109 MG/DL (ref 74–99)
GLUCOSE BLD MANUAL STRIP-MCNC: 114 MG/DL (ref 74–99)
GLUCOSE BLD MANUAL STRIP-MCNC: 116 MG/DL (ref 74–99)
GLUCOSE SERPL-MCNC: 106 MG/DL (ref 74–99)
HCT VFR BLD AUTO: 28.4 % (ref 36–46)
HGB BLD-MCNC: 9.2 G/DL (ref 12–16)
IMM GRANULOCYTES # BLD AUTO: 0.02 X10*3/UL (ref 0–0.5)
IMM GRANULOCYTES NFR BLD AUTO: 0.3 % (ref 0–0.9)
LYMPHOCYTES # BLD AUTO: 2.99 X10*3/UL (ref 0.8–3)
LYMPHOCYTES NFR BLD AUTO: 39.3 %
MAGNESIUM SERPL-MCNC: 1.95 MG/DL (ref 1.6–2.4)
MCH RBC QN AUTO: 29.8 PG (ref 26–34)
MCHC RBC AUTO-ENTMCNC: 32.4 G/DL (ref 32–36)
MCV RBC AUTO: 92 FL (ref 80–100)
MONOCYTES # BLD AUTO: 0.77 X10*3/UL (ref 0.05–0.8)
MONOCYTES NFR BLD AUTO: 10.1 %
NEUTROPHILS # BLD AUTO: 3.52 X10*3/UL (ref 1.6–5.5)
NEUTROPHILS NFR BLD AUTO: 46.4 %
NRBC BLD-RTO: 0 /100 WBCS (ref 0–0)
P AXIS: 44 DEGREES
P AXIS: 63 DEGREES
P AXIS: 71 DEGREES
P OFFSET: 184 MS
P OFFSET: 190 MS
P OFFSET: 202 MS
P ONSET: 127 MS
P ONSET: 143 MS
P ONSET: 151 MS
PHOSPHATE SERPL-MCNC: 3.1 MG/DL (ref 2.5–4.9)
PLATELET # BLD AUTO: 234 X10*3/UL (ref 150–450)
POTASSIUM SERPL-SCNC: 4.3 MMOL/L (ref 3.5–5.3)
PR INTERVAL: 140 MS
PR INTERVAL: 154 MS
PR INTERVAL: 176 MS
Q ONSET: 213 MS
Q ONSET: 215 MS
Q ONSET: 228 MS
QRS COUNT: 13 BEATS
QRS COUNT: 14 BEATS
QRS COUNT: 14 BEATS
QRS DURATION: 74 MS
QRS DURATION: 78 MS
QRS DURATION: 78 MS
QT INTERVAL: 372 MS
QT INTERVAL: 380 MS
QT INTERVAL: 394 MS
QTC CALCULATION(BAZETT): 441 MS
QTC CALCULATION(BAZETT): 449 MS
QTC CALCULATION(BAZETT): 452 MS
QTC FREDERICIA: 419 MS
QTC FREDERICIA: 424 MS
QTC FREDERICIA: 430 MS
R AXIS: 42 DEGREES
R AXIS: 49 DEGREES
R AXIS: 7 DEGREES
RBC # BLD AUTO: 3.09 X10*6/UL (ref 4–5.2)
SODIUM SERPL-SCNC: 137 MMOL/L (ref 136–145)
T AXIS: 18 DEGREES
T AXIS: 76 DEGREES
T AXIS: 85 DEGREES
T OFFSET: 403 MS
T OFFSET: 412 MS
T OFFSET: 414 MS
VENTRICULAR RATE: 78 BPM
VENTRICULAR RATE: 81 BPM
VENTRICULAR RATE: 89 BPM
WBC # BLD AUTO: 7.6 X10*3/UL (ref 4.4–11.3)

## 2025-09-03 PROCEDURE — 2500000002 HC RX 250 W HCPCS SELF ADMINISTERED DRUGS (ALT 637 FOR MEDICARE OP, ALT 636 FOR OP/ED): Performed by: NURSE PRACTITIONER

## 2025-09-03 PROCEDURE — 80069 RENAL FUNCTION PANEL: CPT | Performed by: NURSE PRACTITIONER

## 2025-09-03 PROCEDURE — 99233 SBSQ HOSP IP/OBS HIGH 50: CPT | Performed by: INTERNAL MEDICINE

## 2025-09-03 PROCEDURE — 2060000001 HC INTERMEDIATE ICU ROOM DAILY

## 2025-09-03 PROCEDURE — 85025 COMPLETE CBC W/AUTO DIFF WBC: CPT | Performed by: NURSE PRACTITIONER

## 2025-09-03 PROCEDURE — 99232 SBSQ HOSP IP/OBS MODERATE 35: CPT | Performed by: STUDENT IN AN ORGANIZED HEALTH CARE EDUCATION/TRAINING PROGRAM

## 2025-09-03 PROCEDURE — 2500000001 HC RX 250 WO HCPCS SELF ADMINISTERED DRUGS (ALT 637 FOR MEDICARE OP): Performed by: STUDENT IN AN ORGANIZED HEALTH CARE EDUCATION/TRAINING PROGRAM

## 2025-09-03 PROCEDURE — 97535 SELF CARE MNGMENT TRAINING: CPT | Mod: GO

## 2025-09-03 PROCEDURE — 83735 ASSAY OF MAGNESIUM: CPT | Performed by: NURSE PRACTITIONER

## 2025-09-03 PROCEDURE — 2500000004 HC RX 250 GENERAL PHARMACY W/ HCPCS (ALT 636 FOR OP/ED): Performed by: NURSE PRACTITIONER

## 2025-09-03 PROCEDURE — 2500000001 HC RX 250 WO HCPCS SELF ADMINISTERED DRUGS (ALT 637 FOR MEDICARE OP): Performed by: NURSE PRACTITIONER

## 2025-09-03 PROCEDURE — 97530 THERAPEUTIC ACTIVITIES: CPT | Mod: GO

## 2025-09-03 PROCEDURE — 82947 ASSAY GLUCOSE BLOOD QUANT: CPT

## 2025-09-03 PROCEDURE — 36415 COLL VENOUS BLD VENIPUNCTURE: CPT | Performed by: NURSE PRACTITIONER

## 2025-09-03 RX ORDER — DOCUSATE SODIUM 100 MG/1
100 CAPSULE, LIQUID FILLED ORAL 2 TIMES DAILY
Status: DISPENSED | OUTPATIENT
Start: 2025-09-03

## 2025-09-03 RX ORDER — LOSARTAN POTASSIUM 25 MG/1
25 TABLET ORAL DAILY
Qty: 30 TABLET | Refills: 3 | Status: SHIPPED | OUTPATIENT
Start: 2025-09-03 | End: 2026-01-01

## 2025-09-03 RX ADMIN — DOCUSATE SODIUM 100 MG: 100 CAPSULE, LIQUID FILLED ORAL at 09:57

## 2025-09-03 RX ADMIN — HEPARIN SODIUM 5000 UNITS: 5000 INJECTION, SOLUTION INTRAVENOUS; SUBCUTANEOUS at 09:52

## 2025-09-03 RX ADMIN — ROSUVASTATIN CALCIUM 20 MG: 20 TABLET, FILM COATED ORAL at 09:52

## 2025-09-03 RX ADMIN — METOPROLOL TARTRATE 25 MG: 25 TABLET, FILM COATED ORAL at 09:52

## 2025-09-03 RX ADMIN — HEPARIN SODIUM 5000 UNITS: 5000 INJECTION, SOLUTION INTRAVENOUS; SUBCUTANEOUS at 17:33

## 2025-09-03 RX ADMIN — ASPIRIN 81 MG: 81 TABLET, CHEWABLE ORAL at 09:52

## 2025-09-03 RX ADMIN — PIOGLITAZONE HYDROCHLORIDE 30 MG: 30 TABLET ORAL at 09:52

## 2025-09-03 RX ADMIN — Medication 125 MCG: at 09:52

## 2025-09-03 RX ADMIN — METOPROLOL TARTRATE 25 MG: 25 TABLET, FILM COATED ORAL at 22:16

## 2025-09-03 RX ADMIN — VITAM B12 100 MCG: 100 TAB at 09:52

## 2025-09-03 ASSESSMENT — COGNITIVE AND FUNCTIONAL STATUS - GENERAL
MOBILITY SCORE: 24
DRESSING REGULAR LOWER BODY CLOTHING: A LOT
DAILY ACTIVITIY SCORE: 15
DRESSING REGULAR UPPER BODY CLOTHING: A LITTLE
HELP NEEDED FOR BATHING: A LOT
PERSONAL GROOMING: A LITTLE
DAILY ACTIVITIY SCORE: 24
MOBILITY SCORE: 24
DAILY ACTIVITIY SCORE: 24
MOBILITY SCORE: 24
DAILY ACTIVITIY SCORE: 24
TOILETING: A LOT
EATING MEALS: A LITTLE

## 2025-09-03 ASSESSMENT — ACTIVITIES OF DAILY LIVING (ADL)
HOME_MANAGEMENT_TIME_ENTRY: 14
LACK_OF_TRANSPORTATION: NO

## 2025-09-03 ASSESSMENT — PAIN SCALES - GENERAL
PAINLEVEL_OUTOF10: 0 - NO PAIN
PAINLEVEL_OUTOF10: 0 - NO PAIN

## 2025-09-03 ASSESSMENT — PAIN - FUNCTIONAL ASSESSMENT: PAIN_FUNCTIONAL_ASSESSMENT: 0-10

## 2025-09-04 ENCOUNTER — DOCUMENTATION (OUTPATIENT)
Dept: HOME HEALTH SERVICES | Facility: HOME HEALTH | Age: 75
End: 2025-09-04
Payer: MEDICARE

## 2025-09-04 LAB
ALBUMIN SERPL BCP-MCNC: 3.2 G/DL (ref 3.4–5)
ANION GAP SERPL CALC-SCNC: 9 MMOL/L (ref 10–20)
BASOPHILS # BLD AUTO: 0.05 X10*3/UL (ref 0–0.1)
BASOPHILS NFR BLD AUTO: 0.6 %
BUN SERPL-MCNC: 23 MG/DL (ref 6–23)
CALCIUM SERPL-MCNC: 8.7 MG/DL (ref 8.6–10.3)
CHLORIDE SERPL-SCNC: 108 MMOL/L (ref 98–107)
CO2 SERPL-SCNC: 23 MMOL/L (ref 21–32)
CREAT SERPL-MCNC: 1.41 MG/DL (ref 0.5–1.05)
EGFRCR SERPLBLD CKD-EPI 2021: 38 ML/MIN/1.73M*2
EOSINOPHIL # BLD AUTO: 0.27 X10*3/UL (ref 0–0.4)
EOSINOPHIL NFR BLD AUTO: 3.4 %
ERYTHROCYTE [DISTWIDTH] IN BLOOD BY AUTOMATED COUNT: 15.1 % (ref 11.5–14.5)
GLUCOSE BLD MANUAL STRIP-MCNC: 120 MG/DL (ref 74–99)
GLUCOSE BLD MANUAL STRIP-MCNC: 128 MG/DL (ref 74–99)
GLUCOSE BLD MANUAL STRIP-MCNC: 138 MG/DL (ref 74–99)
GLUCOSE BLD MANUAL STRIP-MCNC: 98 MG/DL (ref 74–99)
GLUCOSE SERPL-MCNC: 88 MG/DL (ref 74–99)
HCT VFR BLD AUTO: 27.1 % (ref 36–46)
HGB BLD-MCNC: 8.8 G/DL (ref 12–16)
IMM GRANULOCYTES # BLD AUTO: 0.02 X10*3/UL (ref 0–0.5)
IMM GRANULOCYTES NFR BLD AUTO: 0.3 % (ref 0–0.9)
LYMPHOCYTES # BLD AUTO: 3.44 X10*3/UL (ref 0.8–3)
LYMPHOCYTES NFR BLD AUTO: 43.9 %
MAGNESIUM SERPL-MCNC: 1.97 MG/DL (ref 1.6–2.4)
MCH RBC QN AUTO: 30.2 PG (ref 26–34)
MCHC RBC AUTO-ENTMCNC: 32.5 G/DL (ref 32–36)
MCV RBC AUTO: 93 FL (ref 80–100)
MONOCYTES # BLD AUTO: 0.79 X10*3/UL (ref 0.05–0.8)
MONOCYTES NFR BLD AUTO: 10.1 %
NEUTROPHILS # BLD AUTO: 3.27 X10*3/UL (ref 1.6–5.5)
NEUTROPHILS NFR BLD AUTO: 41.7 %
NRBC BLD-RTO: 0 /100 WBCS (ref 0–0)
PHOSPHATE SERPL-MCNC: 2.9 MG/DL (ref 2.5–4.9)
PLATELET # BLD AUTO: 238 X10*3/UL (ref 150–450)
POTASSIUM SERPL-SCNC: 4.2 MMOL/L (ref 3.5–5.3)
RBC # BLD AUTO: 2.91 X10*6/UL (ref 4–5.2)
SODIUM SERPL-SCNC: 136 MMOL/L (ref 136–145)
WBC # BLD AUTO: 7.8 X10*3/UL (ref 4.4–11.3)

## 2025-09-04 PROCEDURE — 97116 GAIT TRAINING THERAPY: CPT | Mod: GP,CQ

## 2025-09-04 PROCEDURE — 85025 COMPLETE CBC W/AUTO DIFF WBC: CPT | Performed by: NURSE PRACTITIONER

## 2025-09-04 PROCEDURE — 2500000002 HC RX 250 W HCPCS SELF ADMINISTERED DRUGS (ALT 637 FOR MEDICARE OP, ALT 636 FOR OP/ED): Performed by: NURSE PRACTITIONER

## 2025-09-04 PROCEDURE — 2060000001 HC INTERMEDIATE ICU ROOM DAILY

## 2025-09-04 PROCEDURE — 97530 THERAPEUTIC ACTIVITIES: CPT | Mod: GP,CQ

## 2025-09-04 PROCEDURE — 2500000001 HC RX 250 WO HCPCS SELF ADMINISTERED DRUGS (ALT 637 FOR MEDICARE OP): Performed by: NURSE PRACTITIONER

## 2025-09-04 PROCEDURE — 82947 ASSAY GLUCOSE BLOOD QUANT: CPT

## 2025-09-04 PROCEDURE — 80069 RENAL FUNCTION PANEL: CPT | Performed by: NURSE PRACTITIONER

## 2025-09-04 PROCEDURE — 83735 ASSAY OF MAGNESIUM: CPT | Performed by: NURSE PRACTITIONER

## 2025-09-04 PROCEDURE — 36415 COLL VENOUS BLD VENIPUNCTURE: CPT | Performed by: NURSE PRACTITIONER

## 2025-09-04 PROCEDURE — 2500000004 HC RX 250 GENERAL PHARMACY W/ HCPCS (ALT 636 FOR OP/ED): Performed by: NURSE PRACTITIONER

## 2025-09-04 PROCEDURE — 2500000004 HC RX 250 GENERAL PHARMACY W/ HCPCS (ALT 636 FOR OP/ED): Performed by: STUDENT IN AN ORGANIZED HEALTH CARE EDUCATION/TRAINING PROGRAM

## 2025-09-04 PROCEDURE — 99232 SBSQ HOSP IP/OBS MODERATE 35: CPT | Performed by: STUDENT IN AN ORGANIZED HEALTH CARE EDUCATION/TRAINING PROGRAM

## 2025-09-04 RX ORDER — DOCUSATE SODIUM 100 MG/1
100 CAPSULE, LIQUID FILLED ORAL 2 TIMES DAILY
Qty: 20 CAPSULE | Refills: 0 | Status: SHIPPED | OUTPATIENT
Start: 2025-09-04

## 2025-09-04 RX ADMIN — METOPROLOL TARTRATE 25 MG: 25 TABLET, FILM COATED ORAL at 21:15

## 2025-09-04 RX ADMIN — SODIUM CHLORIDE 1000 ML: 0.9 INJECTION, SOLUTION INTRAVENOUS at 10:40

## 2025-09-04 RX ADMIN — Medication 125 MCG: at 08:55

## 2025-09-04 RX ADMIN — HEPARIN SODIUM 5000 UNITS: 5000 INJECTION, SOLUTION INTRAVENOUS; SUBCUTANEOUS at 18:07

## 2025-09-04 RX ADMIN — METOPROLOL TARTRATE 25 MG: 25 TABLET, FILM COATED ORAL at 08:55

## 2025-09-04 RX ADMIN — PIOGLITAZONE HYDROCHLORIDE 30 MG: 30 TABLET ORAL at 08:55

## 2025-09-04 RX ADMIN — HEPARIN SODIUM 5000 UNITS: 5000 INJECTION, SOLUTION INTRAVENOUS; SUBCUTANEOUS at 10:19

## 2025-09-04 RX ADMIN — PANTOPRAZOLE SODIUM 40 MG: 40 TABLET, DELAYED RELEASE ORAL at 06:03

## 2025-09-04 RX ADMIN — ASPIRIN 81 MG: 81 TABLET, CHEWABLE ORAL at 08:54

## 2025-09-04 RX ADMIN — VITAM B12 100 MCG: 100 TAB at 08:54

## 2025-09-04 RX ADMIN — ROSUVASTATIN CALCIUM 20 MG: 20 TABLET, FILM COATED ORAL at 08:55

## 2025-09-04 ASSESSMENT — PAIN SCALES - GENERAL
PAINLEVEL_OUTOF10: 0 - NO PAIN
PAINLEVEL_OUTOF10: 0 - NO PAIN

## 2025-09-04 ASSESSMENT — COGNITIVE AND FUNCTIONAL STATUS - GENERAL
STANDING UP FROM CHAIR USING ARMS: A LITTLE
MOVING FROM LYING ON BACK TO SITTING ON SIDE OF FLAT BED WITH BEDRAILS: A LITTLE
DAILY ACTIVITIY SCORE: 24
CLIMB 3 TO 5 STEPS WITH RAILING: TOTAL
MOBILITY SCORE: 24
TURNING FROM BACK TO SIDE WHILE IN FLAT BAD: A LITTLE
MOVING TO AND FROM BED TO CHAIR: A LITTLE
MOBILITY SCORE: 16
WALKING IN HOSPITAL ROOM: A LITTLE

## 2025-09-04 ASSESSMENT — PAIN - FUNCTIONAL ASSESSMENT
PAIN_FUNCTIONAL_ASSESSMENT: 0-10
PAIN_FUNCTIONAL_ASSESSMENT: 0-10

## 2025-09-05 ENCOUNTER — APPOINTMENT (OUTPATIENT)
Dept: PRIMARY CARE | Facility: CLINIC | Age: 75
End: 2025-09-05
Payer: MEDICARE

## 2025-09-05 LAB
ALBUMIN SERPL BCP-MCNC: 3.1 G/DL (ref 3.4–5)
ANION GAP SERPL CALC-SCNC: 9 MMOL/L (ref 10–20)
BASOPHILS # BLD AUTO: 0.04 X10*3/UL (ref 0–0.1)
BASOPHILS NFR BLD AUTO: 0.5 %
BUN SERPL-MCNC: 23 MG/DL (ref 6–23)
CALCIUM SERPL-MCNC: 8.7 MG/DL (ref 8.6–10.3)
CHLORIDE SERPL-SCNC: 111 MMOL/L (ref 98–107)
CO2 SERPL-SCNC: 24 MMOL/L (ref 21–32)
CREAT SERPL-MCNC: 1.49 MG/DL (ref 0.5–1.05)
EGFRCR SERPLBLD CKD-EPI 2021: 36 ML/MIN/1.73M*2
EOSINOPHIL # BLD AUTO: 0.34 X10*3/UL (ref 0–0.4)
EOSINOPHIL NFR BLD AUTO: 4.3 %
ERYTHROCYTE [DISTWIDTH] IN BLOOD BY AUTOMATED COUNT: 15.4 % (ref 11.5–14.5)
GLUCOSE BLD MANUAL STRIP-MCNC: 113 MG/DL (ref 74–99)
GLUCOSE BLD MANUAL STRIP-MCNC: 132 MG/DL (ref 74–99)
GLUCOSE BLD MANUAL STRIP-MCNC: 132 MG/DL (ref 74–99)
GLUCOSE BLD MANUAL STRIP-MCNC: 136 MG/DL (ref 74–99)
GLUCOSE SERPL-MCNC: 98 MG/DL (ref 74–99)
HCT VFR BLD AUTO: 27.2 % (ref 36–46)
HGB BLD-MCNC: 8.5 G/DL (ref 12–16)
IMM GRANULOCYTES # BLD AUTO: 0.03 X10*3/UL (ref 0–0.5)
IMM GRANULOCYTES NFR BLD AUTO: 0.4 % (ref 0–0.9)
LYMPHOCYTES # BLD AUTO: 3.32 X10*3/UL (ref 0.8–3)
LYMPHOCYTES NFR BLD AUTO: 42 %
MAGNESIUM SERPL-MCNC: 1.96 MG/DL (ref 1.6–2.4)
MCH RBC QN AUTO: 29.9 PG (ref 26–34)
MCHC RBC AUTO-ENTMCNC: 31.3 G/DL (ref 32–36)
MCV RBC AUTO: 96 FL (ref 80–100)
MONOCYTES # BLD AUTO: 0.73 X10*3/UL (ref 0.05–0.8)
MONOCYTES NFR BLD AUTO: 9.2 %
NEUTROPHILS # BLD AUTO: 3.44 X10*3/UL (ref 1.6–5.5)
NEUTROPHILS NFR BLD AUTO: 43.6 %
NRBC BLD-RTO: 0 /100 WBCS (ref 0–0)
PHOSPHATE SERPL-MCNC: 3.2 MG/DL (ref 2.5–4.9)
PLATELET # BLD AUTO: 230 X10*3/UL (ref 150–450)
POTASSIUM SERPL-SCNC: 4.5 MMOL/L (ref 3.5–5.3)
RBC # BLD AUTO: 2.84 X10*6/UL (ref 4–5.2)
SODIUM SERPL-SCNC: 139 MMOL/L (ref 136–145)
WBC # BLD AUTO: 7.9 X10*3/UL (ref 4.4–11.3)

## 2025-09-05 PROCEDURE — 2060000001 HC INTERMEDIATE ICU ROOM DAILY

## 2025-09-05 PROCEDURE — 2500000001 HC RX 250 WO HCPCS SELF ADMINISTERED DRUGS (ALT 637 FOR MEDICARE OP): Performed by: NURSE PRACTITIONER

## 2025-09-05 PROCEDURE — 2500000002 HC RX 250 W HCPCS SELF ADMINISTERED DRUGS (ALT 637 FOR MEDICARE OP, ALT 636 FOR OP/ED): Performed by: NURSE PRACTITIONER

## 2025-09-05 PROCEDURE — 85025 COMPLETE CBC W/AUTO DIFF WBC: CPT | Performed by: NURSE PRACTITIONER

## 2025-09-05 PROCEDURE — 99239 HOSP IP/OBS DSCHRG MGMT >30: CPT | Performed by: STUDENT IN AN ORGANIZED HEALTH CARE EDUCATION/TRAINING PROGRAM

## 2025-09-05 PROCEDURE — 82947 ASSAY GLUCOSE BLOOD QUANT: CPT

## 2025-09-05 PROCEDURE — 2500000004 HC RX 250 GENERAL PHARMACY W/ HCPCS (ALT 636 FOR OP/ED): Performed by: NURSE PRACTITIONER

## 2025-09-05 PROCEDURE — 83735 ASSAY OF MAGNESIUM: CPT | Performed by: NURSE PRACTITIONER

## 2025-09-05 PROCEDURE — 36415 COLL VENOUS BLD VENIPUNCTURE: CPT | Performed by: NURSE PRACTITIONER

## 2025-09-05 PROCEDURE — 80069 RENAL FUNCTION PANEL: CPT | Performed by: NURSE PRACTITIONER

## 2025-09-05 RX ADMIN — PANTOPRAZOLE SODIUM 40 MG: 40 TABLET, DELAYED RELEASE ORAL at 06:07

## 2025-09-05 RX ADMIN — PIOGLITAZONE HYDROCHLORIDE 30 MG: 30 TABLET ORAL at 09:58

## 2025-09-05 RX ADMIN — HEPARIN SODIUM 5000 UNITS: 5000 INJECTION, SOLUTION INTRAVENOUS; SUBCUTANEOUS at 01:16

## 2025-09-05 RX ADMIN — ASPIRIN 81 MG: 81 TABLET, CHEWABLE ORAL at 09:58

## 2025-09-05 RX ADMIN — METOPROLOL TARTRATE 25 MG: 25 TABLET, FILM COATED ORAL at 20:42

## 2025-09-05 RX ADMIN — ROSUVASTATIN CALCIUM 20 MG: 20 TABLET, FILM COATED ORAL at 09:58

## 2025-09-05 RX ADMIN — Medication 125 MCG: at 09:59

## 2025-09-05 RX ADMIN — HEPARIN SODIUM 5000 UNITS: 5000 INJECTION, SOLUTION INTRAVENOUS; SUBCUTANEOUS at 20:15

## 2025-09-05 RX ADMIN — HEPARIN SODIUM 5000 UNITS: 5000 INJECTION, SOLUTION INTRAVENOUS; SUBCUTANEOUS at 10:14

## 2025-09-05 RX ADMIN — METOPROLOL TARTRATE 25 MG: 25 TABLET, FILM COATED ORAL at 09:59

## 2025-09-05 RX ADMIN — VITAM B12 100 MCG: 100 TAB at 09:59

## 2025-09-05 ASSESSMENT — PAIN - FUNCTIONAL ASSESSMENT
PAIN_FUNCTIONAL_ASSESSMENT: 0-10

## 2025-09-05 ASSESSMENT — COGNITIVE AND FUNCTIONAL STATUS - GENERAL
CLIMB 3 TO 5 STEPS WITH RAILING: A LITTLE
WALKING IN HOSPITAL ROOM: A LITTLE
DAILY ACTIVITIY SCORE: 24
MOBILITY SCORE: 22

## 2025-09-05 ASSESSMENT — PAIN SCALES - GENERAL
PAINLEVEL_OUTOF10: 0 - NO PAIN

## 2025-09-06 LAB
ALBUMIN SERPL BCP-MCNC: 3.2 G/DL (ref 3.4–5)
ANION GAP SERPL CALC-SCNC: 10 MMOL/L (ref 10–20)
BASOPHILS # BLD AUTO: 0.05 X10*3/UL (ref 0–0.1)
BASOPHILS NFR BLD AUTO: 0.6 %
BUN SERPL-MCNC: 25 MG/DL (ref 6–23)
CALCIUM SERPL-MCNC: 9 MG/DL (ref 8.6–10.3)
CHLORIDE SERPL-SCNC: 108 MMOL/L (ref 98–107)
CO2 SERPL-SCNC: 25 MMOL/L (ref 21–32)
CREAT SERPL-MCNC: 1.53 MG/DL (ref 0.5–1.05)
EGFRCR SERPLBLD CKD-EPI 2021: 35 ML/MIN/1.73M*2
EOSINOPHIL # BLD AUTO: 0.36 X10*3/UL (ref 0–0.4)
EOSINOPHIL NFR BLD AUTO: 4.1 %
ERYTHROCYTE [DISTWIDTH] IN BLOOD BY AUTOMATED COUNT: 15.4 % (ref 11.5–14.5)
GLUCOSE BLD MANUAL STRIP-MCNC: 101 MG/DL (ref 74–99)
GLUCOSE BLD MANUAL STRIP-MCNC: 102 MG/DL (ref 74–99)
GLUCOSE BLD MANUAL STRIP-MCNC: 130 MG/DL (ref 74–99)
GLUCOSE BLD MANUAL STRIP-MCNC: 139 MG/DL (ref 74–99)
GLUCOSE SERPL-MCNC: 101 MG/DL (ref 74–99)
HCT VFR BLD AUTO: 27.2 % (ref 36–46)
HGB BLD-MCNC: 8.7 G/DL (ref 12–16)
IMM GRANULOCYTES # BLD AUTO: 0.02 X10*3/UL (ref 0–0.5)
IMM GRANULOCYTES NFR BLD AUTO: 0.2 % (ref 0–0.9)
LYMPHOCYTES # BLD AUTO: 3.45 X10*3/UL (ref 0.8–3)
LYMPHOCYTES NFR BLD AUTO: 38.9 %
MAGNESIUM SERPL-MCNC: 2.03 MG/DL (ref 1.6–2.4)
MCH RBC QN AUTO: 30.4 PG (ref 26–34)
MCHC RBC AUTO-ENTMCNC: 32 G/DL (ref 32–36)
MCV RBC AUTO: 95 FL (ref 80–100)
MONOCYTES # BLD AUTO: 0.73 X10*3/UL (ref 0.05–0.8)
MONOCYTES NFR BLD AUTO: 8.2 %
NEUTROPHILS # BLD AUTO: 4.25 X10*3/UL (ref 1.6–5.5)
NEUTROPHILS NFR BLD AUTO: 48 %
NRBC BLD-RTO: 0 /100 WBCS (ref 0–0)
PHOSPHATE SERPL-MCNC: 3.4 MG/DL (ref 2.5–4.9)
PLATELET # BLD AUTO: 235 X10*3/UL (ref 150–450)
POTASSIUM SERPL-SCNC: 5 MMOL/L (ref 3.5–5.3)
RBC # BLD AUTO: 2.86 X10*6/UL (ref 4–5.2)
SODIUM SERPL-SCNC: 138 MMOL/L (ref 136–145)
WBC # BLD AUTO: 8.9 X10*3/UL (ref 4.4–11.3)

## 2025-09-06 PROCEDURE — 82947 ASSAY GLUCOSE BLOOD QUANT: CPT

## 2025-09-06 PROCEDURE — 36415 COLL VENOUS BLD VENIPUNCTURE: CPT | Performed by: NURSE PRACTITIONER

## 2025-09-06 PROCEDURE — 2500000001 HC RX 250 WO HCPCS SELF ADMINISTERED DRUGS (ALT 637 FOR MEDICARE OP): Performed by: NURSE PRACTITIONER

## 2025-09-06 PROCEDURE — 2500000002 HC RX 250 W HCPCS SELF ADMINISTERED DRUGS (ALT 637 FOR MEDICARE OP, ALT 636 FOR OP/ED): Performed by: NURSE PRACTITIONER

## 2025-09-06 PROCEDURE — 97116 GAIT TRAINING THERAPY: CPT | Mod: GP,CQ | Performed by: PHYSICAL THERAPY ASSISTANT

## 2025-09-06 PROCEDURE — 85025 COMPLETE CBC W/AUTO DIFF WBC: CPT | Performed by: NURSE PRACTITIONER

## 2025-09-06 PROCEDURE — 2500000004 HC RX 250 GENERAL PHARMACY W/ HCPCS (ALT 636 FOR OP/ED): Performed by: NURSE PRACTITIONER

## 2025-09-06 PROCEDURE — 99232 SBSQ HOSP IP/OBS MODERATE 35: CPT | Performed by: HOSPITALIST

## 2025-09-06 PROCEDURE — 83735 ASSAY OF MAGNESIUM: CPT | Performed by: NURSE PRACTITIONER

## 2025-09-06 PROCEDURE — 2060000001 HC INTERMEDIATE ICU ROOM DAILY

## 2025-09-06 PROCEDURE — 80069 RENAL FUNCTION PANEL: CPT | Performed by: NURSE PRACTITIONER

## 2025-09-06 RX ADMIN — METOPROLOL TARTRATE 25 MG: 25 TABLET, FILM COATED ORAL at 08:15

## 2025-09-06 RX ADMIN — VITAM B12 100 MCG: 100 TAB at 08:15

## 2025-09-06 RX ADMIN — PIOGLITAZONE HYDROCHLORIDE 30 MG: 30 TABLET ORAL at 08:15

## 2025-09-06 RX ADMIN — ASPIRIN 81 MG: 81 TABLET, CHEWABLE ORAL at 08:15

## 2025-09-06 RX ADMIN — Medication 125 MCG: at 08:15

## 2025-09-06 RX ADMIN — PANTOPRAZOLE SODIUM 40 MG: 40 TABLET, DELAYED RELEASE ORAL at 06:41

## 2025-09-06 RX ADMIN — METOPROLOL TARTRATE 25 MG: 25 TABLET, FILM COATED ORAL at 20:40

## 2025-09-06 RX ADMIN — ROSUVASTATIN CALCIUM 20 MG: 20 TABLET, FILM COATED ORAL at 08:15

## 2025-09-06 RX ADMIN — HEPARIN SODIUM 5000 UNITS: 5000 INJECTION, SOLUTION INTRAVENOUS; SUBCUTANEOUS at 18:31

## 2025-09-06 RX ADMIN — ACETAMINOPHEN 650 MG: 325 TABLET ORAL at 18:31

## 2025-09-06 RX ADMIN — HEPARIN SODIUM 5000 UNITS: 5000 INJECTION, SOLUTION INTRAVENOUS; SUBCUTANEOUS at 11:38

## 2025-09-06 ASSESSMENT — COGNITIVE AND FUNCTIONAL STATUS - GENERAL
MOBILITY SCORE: 22
MOVING TO AND FROM BED TO CHAIR: A LITTLE
CLIMB 3 TO 5 STEPS WITH RAILING: A LITTLE
CLIMB 3 TO 5 STEPS WITH RAILING: TOTAL
MOBILITY SCORE: 16
MOVING FROM LYING ON BACK TO SITTING ON SIDE OF FLAT BED WITH BEDRAILS: A LITTLE
TURNING FROM BACK TO SIDE WHILE IN FLAT BAD: A LITTLE
STANDING UP FROM CHAIR USING ARMS: A LITTLE
WALKING IN HOSPITAL ROOM: A LITTLE
DAILY ACTIVITIY SCORE: 24
WALKING IN HOSPITAL ROOM: A LITTLE

## 2025-09-06 ASSESSMENT — PAIN SCALES - GENERAL
PAINLEVEL_OUTOF10: 0 - NO PAIN
PAINLEVEL_OUTOF10: 0 - NO PAIN

## 2025-09-06 ASSESSMENT — PAIN - FUNCTIONAL ASSESSMENT: PAIN_FUNCTIONAL_ASSESSMENT: 0-10

## 2025-09-07 VITALS
OXYGEN SATURATION: 98 % | HEART RATE: 77 BPM | RESPIRATION RATE: 14 BRPM | SYSTOLIC BLOOD PRESSURE: 132 MMHG | HEIGHT: 62 IN | WEIGHT: 158.3 LBS | BODY MASS INDEX: 29.13 KG/M2 | DIASTOLIC BLOOD PRESSURE: 60 MMHG | TEMPERATURE: 97 F

## 2025-09-07 LAB
ALBUMIN SERPL BCP-MCNC: 3.2 G/DL (ref 3.4–5)
ANION GAP SERPL CALC-SCNC: 7 MMOL/L (ref 10–20)
BASOPHILS # BLD AUTO: 0.04 X10*3/UL (ref 0–0.1)
BASOPHILS NFR BLD AUTO: 0.5 %
BUN SERPL-MCNC: 27 MG/DL (ref 6–23)
CALCIUM SERPL-MCNC: 9 MG/DL (ref 8.6–10.3)
CHLORIDE SERPL-SCNC: 108 MMOL/L (ref 98–107)
CO2 SERPL-SCNC: 27 MMOL/L (ref 21–32)
CREAT SERPL-MCNC: 1.55 MG/DL (ref 0.5–1.05)
EGFRCR SERPLBLD CKD-EPI 2021: 34 ML/MIN/1.73M*2
EOSINOPHIL # BLD AUTO: 0.4 X10*3/UL (ref 0–0.4)
EOSINOPHIL NFR BLD AUTO: 5 %
ERYTHROCYTE [DISTWIDTH] IN BLOOD BY AUTOMATED COUNT: 15.3 % (ref 11.5–14.5)
GLUCOSE BLD MANUAL STRIP-MCNC: 106 MG/DL (ref 74–99)
GLUCOSE BLD MANUAL STRIP-MCNC: 129 MG/DL (ref 74–99)
GLUCOSE BLD MANUAL STRIP-MCNC: 196 MG/DL (ref 74–99)
GLUCOSE BLD MANUAL STRIP-MCNC: 98 MG/DL (ref 74–99)
GLUCOSE SERPL-MCNC: 103 MG/DL (ref 74–99)
HCT VFR BLD AUTO: 28.1 % (ref 36–46)
HGB BLD-MCNC: 8.8 G/DL (ref 12–16)
HOLD SPECIMEN: NORMAL
IMM GRANULOCYTES # BLD AUTO: 0.04 X10*3/UL (ref 0–0.5)
IMM GRANULOCYTES NFR BLD AUTO: 0.5 % (ref 0–0.9)
LYMPHOCYTES # BLD AUTO: 3.26 X10*3/UL (ref 0.8–3)
LYMPHOCYTES NFR BLD AUTO: 40.9 %
MAGNESIUM SERPL-MCNC: 2.05 MG/DL (ref 1.6–2.4)
MCH RBC QN AUTO: 30 PG (ref 26–34)
MCHC RBC AUTO-ENTMCNC: 31.3 G/DL (ref 32–36)
MCV RBC AUTO: 96 FL (ref 80–100)
MONOCYTES # BLD AUTO: 0.72 X10*3/UL (ref 0.05–0.8)
MONOCYTES NFR BLD AUTO: 9 %
NEUTROPHILS # BLD AUTO: 3.52 X10*3/UL (ref 1.6–5.5)
NEUTROPHILS NFR BLD AUTO: 44.1 %
NRBC BLD-RTO: 0.3 /100 WBCS (ref 0–0)
PHOSPHATE SERPL-MCNC: 3.5 MG/DL (ref 2.5–4.9)
PLATELET # BLD AUTO: 249 X10*3/UL (ref 150–450)
POTASSIUM SERPL-SCNC: 5.2 MMOL/L (ref 3.5–5.3)
RBC # BLD AUTO: 2.93 X10*6/UL (ref 4–5.2)
SODIUM SERPL-SCNC: 137 MMOL/L (ref 136–145)
WBC # BLD AUTO: 8 X10*3/UL (ref 4.4–11.3)

## 2025-09-07 PROCEDURE — 82947 ASSAY GLUCOSE BLOOD QUANT: CPT

## 2025-09-07 PROCEDURE — 2500000002 HC RX 250 W HCPCS SELF ADMINISTERED DRUGS (ALT 637 FOR MEDICARE OP, ALT 636 FOR OP/ED): Performed by: NURSE PRACTITIONER

## 2025-09-07 PROCEDURE — 83735 ASSAY OF MAGNESIUM: CPT | Performed by: NURSE PRACTITIONER

## 2025-09-07 PROCEDURE — 80069 RENAL FUNCTION PANEL: CPT | Performed by: NURSE PRACTITIONER

## 2025-09-07 PROCEDURE — 2060000001 HC INTERMEDIATE ICU ROOM DAILY

## 2025-09-07 PROCEDURE — 2500000004 HC RX 250 GENERAL PHARMACY W/ HCPCS (ALT 636 FOR OP/ED): Performed by: NURSE PRACTITIONER

## 2025-09-07 PROCEDURE — 85025 COMPLETE CBC W/AUTO DIFF WBC: CPT | Performed by: NURSE PRACTITIONER

## 2025-09-07 PROCEDURE — 36415 COLL VENOUS BLD VENIPUNCTURE: CPT | Performed by: NURSE PRACTITIONER

## 2025-09-07 PROCEDURE — 2500000001 HC RX 250 WO HCPCS SELF ADMINISTERED DRUGS (ALT 637 FOR MEDICARE OP): Performed by: NURSE PRACTITIONER

## 2025-09-07 RX ADMIN — METOPROLOL TARTRATE 25 MG: 25 TABLET, FILM COATED ORAL at 20:58

## 2025-09-07 RX ADMIN — ASPIRIN 81 MG: 81 TABLET, CHEWABLE ORAL at 09:31

## 2025-09-07 RX ADMIN — ROSUVASTATIN CALCIUM 20 MG: 20 TABLET, FILM COATED ORAL at 09:31

## 2025-09-07 RX ADMIN — Medication 125 MCG: at 09:31

## 2025-09-07 RX ADMIN — HEPARIN SODIUM 5000 UNITS: 5000 INJECTION, SOLUTION INTRAVENOUS; SUBCUTANEOUS at 17:21

## 2025-09-07 RX ADMIN — METOPROLOL TARTRATE 25 MG: 25 TABLET, FILM COATED ORAL at 09:31

## 2025-09-07 RX ADMIN — PIOGLITAZONE HYDROCHLORIDE 30 MG: 30 TABLET ORAL at 09:31

## 2025-09-07 RX ADMIN — PANTOPRAZOLE SODIUM 40 MG: 40 TABLET, DELAYED RELEASE ORAL at 06:45

## 2025-09-07 RX ADMIN — HEPARIN SODIUM 5000 UNITS: 5000 INJECTION, SOLUTION INTRAVENOUS; SUBCUTANEOUS at 03:16

## 2025-09-07 RX ADMIN — VITAM B12 100 MCG: 100 TAB at 09:31

## 2025-09-07 RX ADMIN — HEPARIN SODIUM 5000 UNITS: 5000 INJECTION, SOLUTION INTRAVENOUS; SUBCUTANEOUS at 09:31

## 2025-09-07 ASSESSMENT — PAIN SCALES - GENERAL
PAINLEVEL_OUTOF10: 0 - NO PAIN

## 2025-09-07 ASSESSMENT — COGNITIVE AND FUNCTIONAL STATUS - GENERAL
HELP NEEDED FOR BATHING: A LITTLE
WALKING IN HOSPITAL ROOM: A LITTLE
TOILETING: A LITTLE
CLIMB 3 TO 5 STEPS WITH RAILING: A LITTLE
MOVING TO AND FROM BED TO CHAIR: A LITTLE
DAILY ACTIVITIY SCORE: 21
DRESSING REGULAR LOWER BODY CLOTHING: A LITTLE
MOBILITY SCORE: 20
STANDING UP FROM CHAIR USING ARMS: A LITTLE

## 2025-09-07 ASSESSMENT — PAIN - FUNCTIONAL ASSESSMENT
PAIN_FUNCTIONAL_ASSESSMENT: 0-10
PAIN_FUNCTIONAL_ASSESSMENT: 0-10

## 2025-09-08 ENCOUNTER — APPOINTMENT (OUTPATIENT)
Dept: CARDIOLOGY | Facility: CLINIC | Age: 75
End: 2025-09-08
Payer: MEDICARE

## 2025-10-06 ENCOUNTER — APPOINTMENT (OUTPATIENT)
Dept: CARDIOLOGY | Facility: CLINIC | Age: 75
End: 2025-10-06
Payer: MEDICARE

## 2026-06-19 ENCOUNTER — APPOINTMENT (OUTPATIENT)
Dept: PRIMARY CARE | Facility: CLINIC | Age: 76
End: 2026-06-19
Payer: MEDICARE

## (undated) DEVICE — TR BAND, RADIAL COMPRESSION, STANDARD, 24CM

## (undated) DEVICE — SHEATH, PINNACLE, W/.038 GW 10CM, 5FR INTRODUCER, 2.5 CM DIALATOR

## (undated) DEVICE — TAVR KIT, UNIV HOSP-CLEVELAND

## (undated) DEVICE — Device

## (undated) DEVICE — CATHETER, BALLOON, VALVULOPLASTY, TRUE, 110 X 18 X 4.5

## (undated) DEVICE — GUIDE WIRE, 035/190CM, HI-TORGUE SUPRA CORE

## (undated) DEVICE — CATHETER, OPTITORQUE, 5FR, TIG, 1H/100CM

## (undated) DEVICE — CATHETER, OPTITORQUE, 5FR, JACKY, 3.5/ 2H/110CM, CURVED

## (undated) DEVICE — SHEATH, GLIDESHEATH, SLENDER, 6FR 10CM

## (undated) DEVICE — GLIDEWIRE, ANGLE, STANDARD, .035 X 180CM, 1.5MM J-TIP

## (undated) DEVICE — VALVE, CONTROL BLEEDBACK

## (undated) DEVICE — INTRODUCER SHEATH, SENTRANT ENSURE SEAL 14FR 28CM

## (undated) DEVICE — GLIDEWIRE, STIFF SHAFT, ANGLE TIP, .035 DIA, 180 CM,  3 CM TIP"

## (undated) DEVICE — GUIDEWIRE, UNIVERSAL BALANCE MID WEIGHT, 190CM, J-TIP

## (undated) DEVICE — TUBING, MANIFOLD, LOW PRESSURE

## (undated) DEVICE — GUIDEWIRE, J3 FIXED CORE, 0.035 IN X 260 CM, EXCHANGE

## (undated) DEVICE — CATHETER, DIAGNOSTIC, JUDKINS, RIGHT, 5 FR-JR 4.0

## (undated) DEVICE — INFLATION KIT, ADVANTAGE, ENCORE 26 (1/BOX)

## (undated) DEVICE — SYRINGE, CORONARY CONTROL, INJECT10N, 10ML, ROTATING ADAPTER

## (undated) DEVICE — CATHETER, PACING, PACEL, FLOW DIRECTED, 5 FR X 110 CM

## (undated) DEVICE — SHEATH, PINNACLE, W/.038 GUIDEWIRE, 10 CM,  8FR INTRODUCER, 8FR DIA, 2.5 CM DIALATOR

## (undated) DEVICE — CATHETER, BALLOON DILATION, EUPHORA SEMICOMPLIANT 2.0  X 15 MM X 142CM

## (undated) DEVICE — CUSTOM MANIFOLD KIT FOR UH/ELYRIA MEDICAL CENTER

## (undated) DEVICE — CABLE, PACING PATIENT BIPOLAR 8'

## (undated) DEVICE — INTRODUCER, SHEATH,  HEMO VALVE, W/ LUER LOCK HUB, SIDEPORT,  6FR X 12CM

## (undated) DEVICE — ACCESS KIT, MINI MAK, 5FR X 10CM, 0.018 X 40CM, SS/SS, STANDARD NEEDLE

## (undated) DEVICE — DEVICE, CLOSURE, PERCLOSE, PROSTYLE

## (undated) DEVICE — DELIVERY SYSTEM, EVOLUT FX, 23-29MM

## (undated) DEVICE — CATHETER, DIAGNOSTIC, JUDKINS, LEFT, 5 FR-JL 3.5

## (undated) DEVICE — GUIDEWIRE, SAFARI 2, .035 X 275CM, EXTRA SMALL CURVE, PRE-SHAPED